# Patient Record
Sex: MALE | Race: WHITE | Employment: OTHER | URBAN - METROPOLITAN AREA
[De-identification: names, ages, dates, MRNs, and addresses within clinical notes are randomized per-mention and may not be internally consistent; named-entity substitution may affect disease eponyms.]

---

## 2017-03-24 RX ORDER — LISINOPRIL AND HYDROCHLOROTHIAZIDE 12.5; 1 MG/1; MG/1
TABLET ORAL
Qty: 30 TABLET | Refills: 0 | Status: SHIPPED | OUTPATIENT
Start: 2017-03-24 | End: 2017-04-24 | Stop reason: SDUPTHER

## 2017-04-24 RX ORDER — LISINOPRIL AND HYDROCHLOROTHIAZIDE 12.5; 1 MG/1; MG/1
TABLET ORAL
Qty: 30 TABLET | Refills: 0 | Status: SHIPPED | OUTPATIENT
Start: 2017-04-24 | End: 2017-05-23 | Stop reason: SDUPTHER

## 2017-05-19 ENCOUNTER — OFFICE VISIT (OUTPATIENT)
Dept: FAMILY MEDICINE CLINIC | Age: 57
End: 2017-05-19

## 2017-05-19 VITALS
TEMPERATURE: 97.8 F | HEIGHT: 68 IN | HEART RATE: 51 BPM | WEIGHT: 264.4 LBS | SYSTOLIC BLOOD PRESSURE: 139 MMHG | DIASTOLIC BLOOD PRESSURE: 77 MMHG | BODY MASS INDEX: 40.07 KG/M2

## 2017-05-19 DIAGNOSIS — M51.36 DDD (DEGENERATIVE DISC DISEASE), LUMBAR: ICD-10-CM

## 2017-05-19 DIAGNOSIS — I10 ESSENTIAL HYPERTENSION, BENIGN: ICD-10-CM

## 2017-05-19 DIAGNOSIS — Z00.00 ROUTINE GENERAL MEDICAL EXAMINATION AT A HEALTH CARE FACILITY: Primary | ICD-10-CM

## 2017-05-19 DIAGNOSIS — M19.072 PRIMARY OSTEOARTHRITIS OF LEFT ANKLE: ICD-10-CM

## 2017-05-19 DIAGNOSIS — M17.0 PRIMARY OSTEOARTHRITIS OF BOTH KNEES: ICD-10-CM

## 2017-05-19 DIAGNOSIS — E66.01 MORBID OBESITY, UNSPECIFIED OBESITY TYPE (HCC): ICD-10-CM

## 2017-05-19 DIAGNOSIS — Z11.59 NEED FOR HEPATITIS C SCREENING TEST: ICD-10-CM

## 2017-05-19 LAB
HCT VFR BLD CALC: 45.4 % (ref 40.5–52.5)
HEMOGLOBIN: 14.6 G/DL (ref 13.5–17.5)
MCH RBC QN AUTO: 30.6 PG (ref 26–34)
MCHC RBC AUTO-ENTMCNC: 32.1 G/DL (ref 31–36)
MCV RBC AUTO: 95.3 FL (ref 80–100)
PDW BLD-RTO: 14.2 % (ref 12.4–15.4)
PLATELET # BLD: 234 K/UL (ref 135–450)
PMV BLD AUTO: 8.6 FL (ref 5–10.5)
RBC # BLD: 4.77 M/UL (ref 4.2–5.9)
WBC # BLD: 7.6 K/UL (ref 4–11)

## 2017-05-19 PROCEDURE — 99396 PREV VISIT EST AGE 40-64: CPT | Performed by: FAMILY MEDICINE

## 2017-05-19 PROCEDURE — G0444 DEPRESSION SCREEN ANNUAL: HCPCS | Performed by: FAMILY MEDICINE

## 2017-05-19 ASSESSMENT — PATIENT HEALTH QUESTIONNAIRE - PHQ9
SUM OF ALL RESPONSES TO PHQ9 QUESTIONS 1 & 2: 6
2. FEELING DOWN, DEPRESSED OR HOPELESS: 3
9. THOUGHTS THAT YOU WOULD BE BETTER OFF DEAD, OR OF HURTING YOURSELF: 1
10. IF YOU CHECKED OFF ANY PROBLEMS, HOW DIFFICULT HAVE THESE PROBLEMS MADE IT FOR YOU TO DO YOUR WORK, TAKE CARE OF THINGS AT HOME, OR GET ALONG WITH OTHER PEOPLE: 1
SUM OF ALL RESPONSES TO PHQ QUESTIONS 1-9: 21
8. MOVING OR SPEAKING SO SLOWLY THAT OTHER PEOPLE COULD HAVE NOTICED. OR THE OPPOSITE, BEING SO FIGETY OR RESTLESS THAT YOU HAVE BEEN MOVING AROUND A LOT MORE THAN USUAL: 2
7. TROUBLE CONCENTRATING ON THINGS, SUCH AS READING THE NEWSPAPER OR WATCHING TELEVISION: 2
5. POOR APPETITE OR OVEREATING: 1
3. TROUBLE FALLING OR STAYING ASLEEP: 3
1. LITTLE INTEREST OR PLEASURE IN DOING THINGS: 3
4. FEELING TIRED OR HAVING LITTLE ENERGY: 3
6. FEELING BAD ABOUT YOURSELF - OR THAT YOU ARE A FAILURE OR HAVE LET YOURSELF OR YOUR FAMILY DOWN: 3

## 2017-05-20 LAB
A/G RATIO: 2.1 (ref 1.1–2.2)
ALBUMIN SERPL-MCNC: 4.5 G/DL (ref 3.4–5)
ALP BLD-CCNC: 53 U/L (ref 40–129)
ALT SERPL-CCNC: 19 U/L (ref 10–40)
ANION GAP SERPL CALCULATED.3IONS-SCNC: 15 MMOL/L (ref 3–16)
AST SERPL-CCNC: 15 U/L (ref 15–37)
BILIRUB SERPL-MCNC: 0.4 MG/DL (ref 0–1)
BUN BLDV-MCNC: 25 MG/DL (ref 7–20)
CALCIUM SERPL-MCNC: 9.1 MG/DL (ref 8.3–10.6)
CHLORIDE BLD-SCNC: 104 MMOL/L (ref 99–110)
CHOLESTEROL, TOTAL: 191 MG/DL (ref 0–199)
CO2: 22 MMOL/L (ref 21–32)
CREAT SERPL-MCNC: 0.8 MG/DL (ref 0.9–1.3)
ESTIMATED AVERAGE GLUCOSE: 111.2 MG/DL
GFR AFRICAN AMERICAN: >60
GFR NON-AFRICAN AMERICAN: >60
GLOBULIN: 2.1 G/DL
GLUCOSE BLD-MCNC: 92 MG/DL (ref 70–99)
HBA1C MFR BLD: 5.5 %
HDLC SERPL-MCNC: 48 MG/DL (ref 40–60)
HEPATITIS C ANTIBODY INTERPRETATION: NORMAL
LDL CHOLESTEROL CALCULATED: 119 MG/DL
POTASSIUM SERPL-SCNC: 4.9 MMOL/L (ref 3.5–5.1)
PROSTATE SPECIFIC ANTIGEN: 0.49 NG/ML (ref 0–4)
SODIUM BLD-SCNC: 141 MMOL/L (ref 136–145)
TOTAL PROTEIN: 6.6 G/DL (ref 6.4–8.2)
TRIGL SERPL-MCNC: 121 MG/DL (ref 0–150)
TSH SERPL DL<=0.05 MIU/L-ACNC: 1.61 UIU/ML (ref 0.27–4.2)
VLDLC SERPL CALC-MCNC: 24 MG/DL

## 2017-05-26 RX ORDER — IBUPROFEN 800 MG/1
TABLET ORAL
Qty: 90 TABLET | Refills: 5 | Status: SHIPPED | OUTPATIENT
Start: 2017-05-26 | End: 2018-08-22 | Stop reason: SDUPTHER

## 2017-05-26 RX ORDER — LISINOPRIL AND HYDROCHLOROTHIAZIDE 12.5; 1 MG/1; MG/1
TABLET ORAL
Qty: 30 TABLET | Refills: 5 | Status: SHIPPED | OUTPATIENT
Start: 2017-05-26 | End: 2017-11-28 | Stop reason: SDUPTHER

## 2017-06-15 DIAGNOSIS — E66.01 MORBID OBESITY, UNSPECIFIED OBESITY TYPE (HCC): ICD-10-CM

## 2017-06-16 RX ORDER — TOPIRAMATE 100 MG/1
TABLET, FILM COATED ORAL
Qty: 30 TABLET | Refills: 5 | Status: SHIPPED | OUTPATIENT
Start: 2017-06-16 | End: 2018-08-22

## 2018-06-04 RX ORDER — LISINOPRIL AND HYDROCHLOROTHIAZIDE 12.5; 1 MG/1; MG/1
TABLET ORAL
Qty: 30 TABLET | Refills: 0 | Status: SHIPPED | OUTPATIENT
Start: 2018-06-04 | End: 2018-07-10 | Stop reason: SDUPTHER

## 2018-07-10 RX ORDER — LISINOPRIL AND HYDROCHLOROTHIAZIDE 12.5; 1 MG/1; MG/1
TABLET ORAL
Qty: 30 TABLET | Refills: 2 | Status: SHIPPED | OUTPATIENT
Start: 2018-07-10 | End: 2018-10-17 | Stop reason: SDUPTHER

## 2018-08-22 ENCOUNTER — OFFICE VISIT (OUTPATIENT)
Dept: FAMILY MEDICINE CLINIC | Age: 58
End: 2018-08-22

## 2018-08-22 VITALS
OXYGEN SATURATION: 97 % | DIASTOLIC BLOOD PRESSURE: 70 MMHG | WEIGHT: 270.8 LBS | TEMPERATURE: 96.1 F | SYSTOLIC BLOOD PRESSURE: 126 MMHG | HEIGHT: 68 IN | HEART RATE: 66 BPM | RESPIRATION RATE: 14 BRPM | BODY MASS INDEX: 41.04 KG/M2

## 2018-08-22 DIAGNOSIS — R06.83 SNORING: ICD-10-CM

## 2018-08-22 DIAGNOSIS — R29.898 BILATERAL LEG WEAKNESS: ICD-10-CM

## 2018-08-22 DIAGNOSIS — Z86.19 HISTORY OF SHINGLES: ICD-10-CM

## 2018-08-22 DIAGNOSIS — M51.36 DDD (DEGENERATIVE DISC DISEASE), LUMBAR: ICD-10-CM

## 2018-08-22 DIAGNOSIS — R21 RASH OF HANDS: ICD-10-CM

## 2018-08-22 DIAGNOSIS — Z00.00 ROUTINE GENERAL MEDICAL EXAMINATION AT A HEALTH CARE FACILITY: Primary | ICD-10-CM

## 2018-08-22 DIAGNOSIS — E66.01 MORBID OBESITY (HCC): ICD-10-CM

## 2018-08-22 DIAGNOSIS — Z00.00 ROUTINE GENERAL MEDICAL EXAMINATION AT A HEALTH CARE FACILITY: ICD-10-CM

## 2018-08-22 DIAGNOSIS — I10 ESSENTIAL HYPERTENSION, BENIGN: ICD-10-CM

## 2018-08-22 LAB
A/G RATIO: 2 (ref 1.1–2.2)
ALBUMIN SERPL-MCNC: 4.5 G/DL (ref 3.4–5)
ALP BLD-CCNC: 61 U/L (ref 40–129)
ALT SERPL-CCNC: 37 U/L (ref 10–40)
ANION GAP SERPL CALCULATED.3IONS-SCNC: 18 MMOL/L (ref 3–16)
AST SERPL-CCNC: 28 U/L (ref 15–37)
BILIRUB SERPL-MCNC: 0.5 MG/DL (ref 0–1)
BUN BLDV-MCNC: 16 MG/DL (ref 7–20)
CALCIUM SERPL-MCNC: 9.4 MG/DL (ref 8.3–10.6)
CHLORIDE BLD-SCNC: 102 MMOL/L (ref 99–110)
CHOLESTEROL, TOTAL: 206 MG/DL (ref 0–199)
CO2: 21 MMOL/L (ref 21–32)
CREAT SERPL-MCNC: 0.7 MG/DL (ref 0.8–1.3)
GFR AFRICAN AMERICAN: >60
GFR NON-AFRICAN AMERICAN: >60
GLOBULIN: 2.2 G/DL
GLUCOSE BLD-MCNC: 98 MG/DL (ref 70–99)
HDLC SERPL-MCNC: 45 MG/DL (ref 40–60)
LDL CHOLESTEROL CALCULATED: 133 MG/DL
POTASSIUM SERPL-SCNC: 4.6 MMOL/L (ref 3.5–5.1)
SODIUM BLD-SCNC: 141 MMOL/L (ref 136–145)
TOTAL PROTEIN: 6.7 G/DL (ref 6.4–8.2)
TRIGL SERPL-MCNC: 140 MG/DL (ref 0–150)
VLDLC SERPL CALC-MCNC: 28 MG/DL

## 2018-08-22 PROCEDURE — 90750 HZV VACC RECOMBINANT IM: CPT | Performed by: FAMILY MEDICINE

## 2018-08-22 PROCEDURE — 90471 IMMUNIZATION ADMIN: CPT | Performed by: FAMILY MEDICINE

## 2018-08-22 PROCEDURE — 99396 PREV VISIT EST AGE 40-64: CPT | Performed by: FAMILY MEDICINE

## 2018-08-22 RX ORDER — IBUPROFEN 800 MG/1
TABLET ORAL
Qty: 90 TABLET | Refills: 0 | Status: ON HOLD | OUTPATIENT
Start: 2018-08-22 | End: 2020-02-21 | Stop reason: HOSPADM

## 2018-08-22 RX ORDER — CLOTRIMAZOLE AND BETAMETHASONE DIPROPIONATE 10; .64 MG/G; MG/G
CREAM TOPICAL
Qty: 45 G | Refills: 0 | Status: SHIPPED | OUTPATIENT
Start: 2018-08-22 | End: 2020-02-05

## 2018-08-22 ASSESSMENT — PATIENT HEALTH QUESTIONNAIRE - PHQ9
SUM OF ALL RESPONSES TO PHQ QUESTIONS 1-9: 0
1. LITTLE INTEREST OR PLEASURE IN DOING THINGS: 0
SUM OF ALL RESPONSES TO PHQ9 QUESTIONS 1 & 2: 0
2. FEELING DOWN, DEPRESSED OR HOPELESS: 0
SUM OF ALL RESPONSES TO PHQ QUESTIONS 1-9: 0

## 2018-08-22 NOTE — PROGRESS NOTES
Here for well checkup and physical.  Pt states that overall has been feeling well. Pt denies any new issues, concerns. Over the winter, pt was down in [place] and developed acute onset of painful rash with swelling and discomfort. Pt went into urgent care and got checked out and was dx with shingles. Pt was treated with valtrex and steroids and sx did improve moderately/quickly. Sx lasted a few weeks   Pt states that was in December, and has not had any recurrent sx. Pt did not have any trigger except was kelsea    Pt here for follow up of blood pressure. Pt states doing great with adherence to therapy and feels well. No issues of chest pain, shortness of breath. No vision changes, headache, swelling in legs. Pt is dealing with weight issues, relates to chronic back issues and ankle. Pt is dealing with chronic back sx. Pt is active and able to function well with his job, being active. Pt does want to look at getting in to see neurosurgery. Pt has done physical therapy in the past and was not helpful, has not had any recent therapy and has never had epidural steroid injection. Pt is not interested in that at this point. Will take motrin 800mg prn. Pt is retired but is active and doing work around [employer], but is limited in terms of walking distances. Does feel legs are weaker. Pt can walk, drive a car, and walk up stairs ok. bowels are doing ok overal, states that ever since colonoscopy does feel that he is having bowel movements for a longer period of time. No episodes of blood, pt does have some intermittent hemorrhoids. Bowels are soft.           Except as noted in the history of present illness as above, the review of systems is negative for the following:    General ROS: negative  Psychological ROS: negative  Allergy and Immunology ROS: negative  Hematological and Lymphatic ROS: negative  Respiratory ROS: no cough, shortness of breath, or wheezing  Cardiovascular ROS: no chest pain or dyspnea on exertion  Gastrointestinal ROS: no abdominal pain, change in bowel habits, or black or bloody stools  Genito-Urinary ROS: no dysuria, trouble voiding, or hematuria  Musculoskeletal ROS: negative  Dermatological ROS: negative      Past medical, surgical, and social history reviewed and updated. Medications and allergies reviewed and updated      /70   Pulse 66   Temp 96.1 °F (35.6 °C) (Oral)   Resp 14   Ht 5' 8.25\" (1.734 m)   Wt 270 lb 12.8 oz (122.8 kg)   SpO2 97%   BMI 40.87 kg/m²   General appearance - healthy, alert, no distress  Skin - Skin color, texture, turgor normal. Mild irritated rash between fingers R hand w/o discharge, erythema, no warmth, discharge, bleeding  Head - Normocephalic. No masses, lesions, tenderness or abnormalities  Eyes - conjunctivae/corneas clear. Pupils equal and reactive to light and accomodation, extraocular muscles intact. Ears - External ears normal. Canals clear. Tympanic membranes normal bilaterally. Nose/Sinuses - Nares normal. Septum midline. Mucosa normal. No drainage or sinus tenderness. Oropharynx - Lips, mucosa, and tongue normal. Teeth and gums normal.   Neck - Neck supple. No adenopathy. Thyroid symmetric, normal size, no carotid bruit bilaterally  Back - Back symmetric, no curvature. Range of motion normal. No Costovertebral angle tenderness. Lungs - Percussion normal. Good diaphragmatic excursion. Lungs clear without wheeze, rales, crackles  Heart - Regular rate and rhythm, with no rub, murmur or gallop noted. Abdomen - Abdomen soft, non-tender. Bowel sounds normal. No masses, tenderness or organomegaly  Testes are normal without masses, no hernias noted. Phallus normal.   Extremities - Extremities normal. No deformities, edema, or skin discoloration  Musculoskeletal - Spine ROM normal. Muscular strength intact.   Peripheral pulses - radial=4/4,, femoral=4/4, popliteal=4/4, dorsalis pedis=4/4,  Neuro - Gait normal. Reflexes normal and symmetric. Sensation grossly normal.  No focal weakness  Psych - euthymic, no suicidal thoughts or ideation, mood stable. Current Outpatient Prescriptions   Medication Sig Dispense Refill    clotrimazole-betamethasone (LOTRISONE) 1-0.05 % cream Apply topically 2 times daily. 45 g 0    lisinopril-hydrochlorothiazide (PRINZIDE;ZESTORETIC) 10-12.5 MG per tablet TAKE 1 TABLET BY MOUTH DAILY 30 tablet 2    omeprazole (PRILOSEC) 20 MG delayed release capsule TAKE ONE CAPSULE BY MOUTH DAILY (Patient taking differently: TAKE ONE CAPSULE BY MOUTH DAILY, PRN) 30 capsule 5    Multiple Vitamins-Minerals (THERAPEUTIC MULTIVITAMIN-MINERALS) tablet Take 1 tablet by mouth daily      ibuprofen (ADVIL;MOTRIN) 800 MG tablet TAKE 1 TABLET BY MOUTH EVERY 6 HOURS AS NEEDED FOR PAIN 90 tablet 0     No current facility-administered medications for this visit. ASSESSMENT / PLAN:    1. Routine general medical examination at a health care facility  No focal abnormalities on exam  Anticipatory guidance discussed. Check bloodwork as below  - CBC; Future  - Comprehensive Metabolic Panel; Future  - Lipid Panel; Future  - PSA, Prostatic Specific Antigen; Future    2. Essential hypertension, benign  blood pressure stable @ goal   Encouraged diet/exercise and weight reduction    3. Morbid obesity ([address]  Aware risk of persistent weight issues  Monitor with attempt at improvement in diet/exericise  Check bloodwork as above    4. History of shingles  Asymptomatic  Given shingrix today    5. Rash of hands  C/w contact derm  tx with lotrisone cream to affected area BID prn    6. DDD (degenerative disc disease), lumbar  Stable w/o flare of sx  Monitor with symptomatic tx, range of motion exercises  F/u for increased sx, weakness in bilateral lower extremities, urinary sx  Consider neurosurgery referral    7.  Bilateral leg weakness  Exam nonfocal  D/t DDD  Aware risk of persistent weakness or progressive sx without tx of degenerative disc disease  Will monitor    8. Snoring  Suspect HORACIO  Pt aware, deferring eval at this time  Aware risk of undertreated/undertreated obstructive sleep apnea            Follow-up appointment:   Pending bloodwork reuslts    Discussed use, benefit, and side effects of all prescribed medications. Barriers to medication compliance addressed. All patient questions answered. Pt voiced understanding. When applicable, patient's outside records were reviewed through [facility]. The patient has signed appropriate paperworks/consents. Dragon dictation software was used for parts of this progress note. All attempts were made to correct any errors and ensure accuracy.

## 2018-08-23 LAB
HCT VFR BLD CALC: 47.8 % (ref 40.5–52.5)
HEMOGLOBIN: 15.9 G/DL (ref 13.5–17.5)
MCH RBC QN AUTO: 30.7 PG (ref 26–34)
MCHC RBC AUTO-ENTMCNC: 33.3 G/DL (ref 31–36)
MCV RBC AUTO: 92.2 FL (ref 80–100)
PDW BLD-RTO: 13.8 % (ref 12.4–15.4)
PLATELET # BLD: 272 K/UL (ref 135–450)
PLATELET SLIDE REVIEW: ADEQUATE
PMV BLD AUTO: 9.1 FL (ref 5–10.5)
PROSTATE SPECIFIC ANTIGEN: 0.59 NG/ML (ref 0–4)
RBC # BLD: 5.18 M/UL (ref 4.2–5.9)
WBC # BLD: 9.3 K/UL (ref 4–11)

## 2018-10-17 RX ORDER — LISINOPRIL AND HYDROCHLOROTHIAZIDE 12.5; 1 MG/1; MG/1
TABLET ORAL
Qty: 30 TABLET | Refills: 5 | Status: SHIPPED | OUTPATIENT
Start: 2018-10-17 | End: 2019-02-20

## 2019-01-05 ENCOUNTER — TELEPHONE (OUTPATIENT)
Dept: FAMILY MEDICINE CLINIC | Age: 59
End: 2019-01-05

## 2019-02-13 RX ORDER — OMEPRAZOLE 20 MG/1
CAPSULE, DELAYED RELEASE ORAL
Qty: 30 CAPSULE | Refills: 3 | Status: SHIPPED | OUTPATIENT
Start: 2019-02-13 | End: 2019-09-12 | Stop reason: SDUPTHER

## 2019-02-20 ENCOUNTER — OFFICE VISIT (OUTPATIENT)
Dept: FAMILY MEDICINE CLINIC | Age: 59
End: 2019-02-20
Payer: COMMERCIAL

## 2019-02-20 VITALS
RESPIRATION RATE: 12 BRPM | BODY MASS INDEX: 40.6 KG/M2 | HEART RATE: 64 BPM | SYSTOLIC BLOOD PRESSURE: 164 MMHG | WEIGHT: 269 LBS | DIASTOLIC BLOOD PRESSURE: 85 MMHG | OXYGEN SATURATION: 96 %

## 2019-02-20 DIAGNOSIS — Z23 NEED FOR SHINGLES VACCINE: ICD-10-CM

## 2019-02-20 DIAGNOSIS — M17.0 PRIMARY OSTEOARTHRITIS OF BOTH KNEES: ICD-10-CM

## 2019-02-20 DIAGNOSIS — L40.9 PSORIASIS: ICD-10-CM

## 2019-02-20 DIAGNOSIS — M19.072 PRIMARY OSTEOARTHRITIS OF LEFT ANKLE: ICD-10-CM

## 2019-02-20 DIAGNOSIS — I10 ESSENTIAL HYPERTENSION, BENIGN: ICD-10-CM

## 2019-02-20 DIAGNOSIS — E66.01 MORBID OBESITY (HCC): ICD-10-CM

## 2019-02-20 DIAGNOSIS — E66.01 CLASS 3 SEVERE OBESITY WITH SERIOUS COMORBIDITY AND BODY MASS INDEX (BMI) OF 40.0 TO 44.9 IN ADULT, UNSPECIFIED OBESITY TYPE (HCC): ICD-10-CM

## 2019-02-20 DIAGNOSIS — M51.36 DDD (DEGENERATIVE DISC DISEASE), LUMBAR: Primary | ICD-10-CM

## 2019-02-20 DIAGNOSIS — L82.1 SK (SEBORRHEIC KERATOSIS): ICD-10-CM

## 2019-02-20 PROCEDURE — 99214 OFFICE O/P EST MOD 30 MIN: CPT | Performed by: FAMILY MEDICINE

## 2019-02-20 PROCEDURE — 90750 HZV VACC RECOMBINANT IM: CPT | Performed by: FAMILY MEDICINE

## 2019-02-20 PROCEDURE — 90471 IMMUNIZATION ADMIN: CPT | Performed by: FAMILY MEDICINE

## 2019-02-20 RX ORDER — CALCIPOTRIENE 50 UG/G
CREAM TOPICAL
Qty: 1 TUBE | Refills: 5 | Status: SHIPPED | OUTPATIENT
Start: 2019-02-20 | End: 2020-02-05

## 2019-02-20 RX ORDER — LISINOPRIL AND HYDROCHLOROTHIAZIDE 25; 20 MG/1; MG/1
1 TABLET ORAL DAILY
Qty: 30 TABLET | Refills: 5 | Status: SHIPPED | OUTPATIENT
Start: 2019-02-20 | End: 2019-08-20 | Stop reason: SDUPTHER

## 2019-02-20 ASSESSMENT — PATIENT HEALTH QUESTIONNAIRE - PHQ9
SUM OF ALL RESPONSES TO PHQ QUESTIONS 1-9: 0
SUM OF ALL RESPONSES TO PHQ9 QUESTIONS 1 & 2: 0
SUM OF ALL RESPONSES TO PHQ QUESTIONS 1-9: 0
2. FEELING DOWN, DEPRESSED OR HOPELESS: 0
1. LITTLE INTEREST OR PLEASURE IN DOING THINGS: 0

## 2019-03-26 DIAGNOSIS — L40.9 PSORIASIS: ICD-10-CM

## 2019-03-26 LAB
A/G RATIO: 1.8 (ref 1.1–2.2)
ALBUMIN SERPL-MCNC: 4.6 G/DL (ref 3.4–5)
ALP BLD-CCNC: 55 U/L (ref 40–129)
ALT SERPL-CCNC: 36 U/L (ref 10–40)
ANION GAP SERPL CALCULATED.3IONS-SCNC: 16 MMOL/L (ref 3–16)
AST SERPL-CCNC: 24 U/L (ref 15–37)
BILIRUB SERPL-MCNC: 0.5 MG/DL (ref 0–1)
BUN BLDV-MCNC: 15 MG/DL (ref 7–20)
C-REACTIVE PROTEIN: 5.2 MG/L (ref 0–5.1)
CALCIUM SERPL-MCNC: 9.8 MG/DL (ref 8.3–10.6)
CHLORIDE BLD-SCNC: 101 MMOL/L (ref 99–110)
CO2: 23 MMOL/L (ref 21–32)
CREAT SERPL-MCNC: 0.6 MG/DL (ref 0.8–1.3)
GFR AFRICAN AMERICAN: >60
GFR NON-AFRICAN AMERICAN: >60
GLOBULIN: 2.6 G/DL
GLUCOSE BLD-MCNC: 84 MG/DL (ref 70–99)
HCT VFR BLD CALC: 47.7 % (ref 40.5–52.5)
HEMOGLOBIN: 16.1 G/DL (ref 13.5–17.5)
MCH RBC QN AUTO: 31 PG (ref 26–34)
MCHC RBC AUTO-ENTMCNC: 33.7 G/DL (ref 31–36)
MCV RBC AUTO: 92 FL (ref 80–100)
PDW BLD-RTO: 13.2 % (ref 12.4–15.4)
PLATELET # BLD: 237 K/UL (ref 135–450)
PMV BLD AUTO: 8.5 FL (ref 5–10.5)
POTASSIUM SERPL-SCNC: 4.6 MMOL/L (ref 3.5–5.1)
RBC # BLD: 5.19 M/UL (ref 4.2–5.9)
RHEUMATOID FACTOR: <10 IU/ML
SODIUM BLD-SCNC: 140 MMOL/L (ref 136–145)
TOTAL PROTEIN: 7.2 G/DL (ref 6.4–8.2)
WBC # BLD: 8.4 K/UL (ref 4–11)

## 2019-03-27 LAB — ANTI-NUCLEAR ANTIBODY (ANA): NEGATIVE

## 2019-05-07 RX ORDER — SILDENAFIL 100 MG/1
100 TABLET, FILM COATED ORAL PRN
Qty: 3 TABLET | Refills: 11 | Status: SHIPPED | OUTPATIENT
Start: 2019-05-07 | End: 2021-07-20

## 2019-08-20 RX ORDER — LISINOPRIL AND HYDROCHLOROTHIAZIDE 25; 20 MG/1; MG/1
TABLET ORAL
Qty: 30 TABLET | Refills: 5 | Status: SHIPPED | OUTPATIENT
Start: 2019-08-20 | End: 2020-03-11 | Stop reason: SDUPTHER

## 2019-09-12 RX ORDER — OMEPRAZOLE 20 MG/1
CAPSULE, DELAYED RELEASE ORAL
Qty: 30 CAPSULE | Refills: 1 | Status: SHIPPED | OUTPATIENT
Start: 2019-09-12 | End: 2019-12-11 | Stop reason: SDUPTHER

## 2019-09-20 RX ORDER — OMEPRAZOLE 20 MG/1
CAPSULE, DELAYED RELEASE ORAL
Qty: 90 CAPSULE | Refills: 1 | Status: ON HOLD | OUTPATIENT
Start: 2019-09-20 | End: 2020-02-20 | Stop reason: SDUPTHER

## 2019-10-29 ENCOUNTER — HOSPITAL ENCOUNTER (OUTPATIENT)
Dept: GENERAL RADIOLOGY | Age: 59
Discharge: HOME | End: 2019-10-29
Payer: COMMERCIAL

## 2019-10-29 DIAGNOSIS — M48.062 LUMBAR STENOSIS WITH NEUROGENIC CLAUDICATION: ICD-10-CM

## 2019-10-29 DIAGNOSIS — Z13.820 ENCOUNTER FOR IMAGING TO ASSESS OSTEOPOROSIS: ICD-10-CM

## 2019-10-29 PROCEDURE — 77080 DXA BONE DENSITY AXIAL: CPT

## 2019-12-11 RX ORDER — OMEPRAZOLE 20 MG/1
CAPSULE, DELAYED RELEASE ORAL
Qty: 30 CAPSULE | Refills: 0 | Status: SHIPPED | OUTPATIENT
Start: 2019-12-11 | End: 2019-12-20

## 2020-02-05 ENCOUNTER — OFFICE VISIT (OUTPATIENT)
Dept: FAMILY MEDICINE CLINIC | Age: 60
End: 2020-02-05
Payer: COMMERCIAL

## 2020-02-05 VITALS
RESPIRATION RATE: 14 BRPM | WEIGHT: 272 LBS | BODY MASS INDEX: 40.29 KG/M2 | SYSTOLIC BLOOD PRESSURE: 131 MMHG | HEIGHT: 69 IN | DIASTOLIC BLOOD PRESSURE: 74 MMHG | HEART RATE: 57 BPM | TEMPERATURE: 97.6 F | OXYGEN SATURATION: 96 %

## 2020-02-05 PROCEDURE — 99244 OFF/OP CNSLTJ NEW/EST MOD 40: CPT | Performed by: FAMILY MEDICINE

## 2020-02-05 PROCEDURE — 93000 ELECTROCARDIOGRAM COMPLETE: CPT | Performed by: FAMILY MEDICINE

## 2020-02-05 RX ORDER — CLOBETASOL PROPIONATE 0.5 MG/G
CREAM TOPICAL
Qty: 45 G | Refills: 2 | Status: ON HOLD | OUTPATIENT
Start: 2020-02-05 | End: 2020-02-20 | Stop reason: CLARIF

## 2020-02-05 ASSESSMENT — PATIENT HEALTH QUESTIONNAIRE - PHQ9
SUM OF ALL RESPONSES TO PHQ9 QUESTIONS 1 & 2: 2
1. LITTLE INTEREST OR PLEASURE IN DOING THINGS: 1
SUM OF ALL RESPONSES TO PHQ QUESTIONS 1-9: 2
SUM OF ALL RESPONSES TO PHQ QUESTIONS 1-9: 2
2. FEELING DOWN, DEPRESSED OR HOPELESS: 1

## 2020-02-05 NOTE — PROGRESS NOTES
Subjective:    Chief Complaint:     Milly Gonzalez is a 62 y.o. male who presents for a preoperative physical examination. He is scheduled to have lumbar disc surgery done by Dr. Sarah Chow at [facility] on 2/20/2020. Pt has had chronic degenerative disc disease in lower back, present for year, but with progressive sx recently. No injury recently but progressively getting worse. Pt states that he has pain, sciatica and weakness. Pt has had recent imaging through [facility]. Pt has not found any benefit with conservative therapy and due to progressive sx, lack of benefit with physical therapy, will be set for surgery. Pt will have anterior/post approach and will see Dr. Geetha Kiran as he will be doing he anterior approach. Surgery will take several hours, and then will be in hospital for 1-2 days, and then home. Pt anticipates will be taking it easy after surgery for 2 wks until f/u surgery    Pt states that he is doing ok with this but with some anxiety. Pt has had several surgeries     Pt here for follow up of blood pressure. Pt states doing great with adherence to therapy and feels well. No issues of chest pain, shortness of breath. No vision changes, headache, swelling in legs.          Past Medical History:   Diagnosis Date    Cholesteatoma     DDD (degenerative disc disease), lumbar     Erectile dysfunction     Essential hypertension, benign     GERD (gastroesophageal reflux disease) 6/20/2014    Low testosterone 1/27/2014    Morbid obesity [address] [address] 3/10/2016    Nontraumatic rupture of tendons of biceps (long head)     Plantar fasciitis     Primary osteoarthritis of both knees 5/19/2017    Primary osteoarthritis of left ankle 5/19/2017          Review of patient's past surgical history indicates:     Past Surgical History:   Procedure Laterality Date    BICEPS TENDON REPAIR Left     CATARACT REMOVAL      MIDDLE EAR SURGERY Right     TONSILLECTOMY AND ADENOIDECTOMY      VASECTOMY No anesthesia complications         Current Outpatient Medications   Medication Sig Dispense Refill    omeprazole (PRILOSEC) 20 MG delayed release capsule TAKE 1 CAPSULE BY MOUTH EVERY DAY 90 capsule 1    omeprazole (PRILOSEC) 20 MG delayed release capsule TAKE 1 CAPSULE BY MOUTH EVERY DAY 90 capsule 1    lisinopril-hydrochlorothiazide (PRINZIDE;ZESTORETIC) 20-25 MG per tablet TAKE 1 TABLET BY MOUTH EVERY DAY 30 tablet 5    sildenafil (VIAGRA) 100 MG tablet Take 1 tablet by mouth as needed for Erectile Dysfunction 3 tablet 11    ibuprofen (ADVIL;MOTRIN) 800 MG tablet TAKE 1 TABLET BY MOUTH EVERY 6 HOURS AS NEEDED FOR PAIN 90 tablet 0    Multiple Vitamins-Minerals (THERAPEUTIC MULTIVITAMIN-MINERALS) tablet Take 1 tablet by mouth daily      calcipotriene (DOVONEX) 0.005 % cream Apply topically 2 times daily. (Patient not taking: Reported on 2020) 1 Tube 5    clotrimazole-betamethasone (LOTRISONE) 1-0.05 % cream Apply topically 2 times daily. (Patient not taking: Reported on 2020) 45 g 0     No current facility-administered medications for this visit. Allergies   Allergen Reactions    Penicillins        Social History     Tobacco Use    Smoking status: Former Smoker     Packs/day: 2.00     Years: 40.00     Pack years: 80.00     Types: Cigarettes     Last attempt to quit: 2012     Years since quittin.8    Smokeless tobacco: Never Used   Substance Use Topics    Alcohol use:  Yes     Alcohol/week: 16.7 standard drinks     Types: 20 Standard drinks or equivalent per week    Drug use: Not on file        Family History   Problem Relation Age of Onset    Other Unknown         AAA    Hypertension Unknown     Ovarian Cancer Mother     Rheum Arthritis Unknown         Review Of Systems    Skin: no abnormal pigmentation, rash, scaling, itching, masses, hair or nail changes  Eyes: negative  Ears/Nose/Throat: negative  Respiratory: negative  Cardiovascular: negative  Gastrointestinal: negative  Genitourinary: negative  Musculoskeletal: negative  Neurologic: negative  Psychiatric: negative  Hematologic/Lymphatic/Immunologic: negative  Endocrine: negative       Objective:      /74   Pulse 57   Temp 97.6 °F (36.4 °C) (Oral)   Resp 14   Ht 5' 8.5\" (1.74 m)   Wt 272 lb (123.4 kg)   SpO2 96%   BMI 40.76 kg/m²   General appearance - healthy, alert, no distress  Skin - Skin color, texture, turgor normal. No rashes or lesions. Head - Normocephalic. No masses, lesions, tenderness or abnormalities  Eyes - conjunctivae/corneas clear. PERRL, EOM's intact. Ears - External ears normal. Canals clear. TM's normal.  Nose/Sinuses - Nares normal. Septum midline. Mucosa normal. No drainage or sinus tenderness. Oropharynx - Lips, mucosa, and tongue normal. Teeth and gums normal.   Neck - Neck supple. No adenopathy. Thyroid symmetric, normal size,  Back - Back symmetric, no curvature. ROM normal. No CVA tenderness. Lungs - Percussion normal. Good diaphragmatic excursion. Lungs clear  Heart - Regular rate and rhythm, with no rub, murmur or gallop noted. Abdomen - Abdomen soft, non-tender. BS normal. No masses, organomegaly  Extremities - Extremities normal. No deformities, edema, or skin discoloration  Musculoskeletal - Spine ROM normal. Muscular strength intact. Peripheral pulses - radial=4/4,, femoral=4/4, popliteal=4/4, dorsalis pedis=4/4,  Neuro - Gait normal. Reflexes normal and symmetric. Sensation grossly normal.  No focal weakness    EKG: normal EKG, normal sinus rhythm, unchanged from previous tracings, sinus bradycardia. ASSESSMENT / PLAN:    1. Preop general physical exam  Medically cleared for surgery. Check bloodwork as below  - EKG 12 Lead  - Protime-INR; Future  - APTT; Future    2. DDD (degenerative disc disease), lumbar  Chronic progressive, set for surgery  Medically cleared for surgery.       3. Essential hypertension, benign  Stable @ goal,

## 2020-02-07 DIAGNOSIS — Z01.818 PREOP GENERAL PHYSICAL EXAM: ICD-10-CM

## 2020-02-07 DIAGNOSIS — E66.01 CLASS 3 SEVERE OBESITY WITH SERIOUS COMORBIDITY AND BODY MASS INDEX (BMI) OF 40.0 TO 44.9 IN ADULT, UNSPECIFIED OBESITY TYPE (HCC): ICD-10-CM

## 2020-02-07 DIAGNOSIS — I10 ESSENTIAL HYPERTENSION, BENIGN: ICD-10-CM

## 2020-02-07 LAB
A/G RATIO: 2.2 (ref 1.1–2.2)
ALBUMIN SERPL-MCNC: 4.4 G/DL (ref 3.4–5)
ALP BLD-CCNC: 50 U/L (ref 40–129)
ALT SERPL-CCNC: 38 U/L (ref 10–40)
ANION GAP SERPL CALCULATED.3IONS-SCNC: 11 MMOL/L (ref 3–16)
APTT: 31.3 SEC (ref 24.2–36.2)
AST SERPL-CCNC: 26 U/L (ref 15–37)
BILIRUB SERPL-MCNC: <0.2 MG/DL (ref 0–1)
BUN BLDV-MCNC: 15 MG/DL (ref 7–20)
CALCIUM SERPL-MCNC: 9.3 MG/DL (ref 8.3–10.6)
CHLORIDE BLD-SCNC: 104 MMOL/L (ref 99–110)
CHOLESTEROL, TOTAL: 199 MG/DL (ref 0–199)
CO2: 26 MMOL/L (ref 21–32)
CREAT SERPL-MCNC: 0.7 MG/DL (ref 0.8–1.3)
ESTIMATED AVERAGE GLUCOSE: 116.9 MG/DL
GFR AFRICAN AMERICAN: >60
GFR NON-AFRICAN AMERICAN: >60
GLOBULIN: 2 G/DL
GLUCOSE BLD-MCNC: 102 MG/DL (ref 70–99)
HBA1C MFR BLD: 5.7 %
HCT VFR BLD CALC: 48 % (ref 40.5–52.5)
HDLC SERPL-MCNC: 50 MG/DL (ref 40–60)
HEMOGLOBIN: 16 G/DL (ref 13.5–17.5)
INR BLD: 0.91 (ref 0.86–1.14)
LDL CHOLESTEROL CALCULATED: 130 MG/DL
MCH RBC QN AUTO: 31.4 PG (ref 26–34)
MCHC RBC AUTO-ENTMCNC: 33.4 G/DL (ref 31–36)
MCV RBC AUTO: 94 FL (ref 80–100)
PDW BLD-RTO: 13.3 % (ref 12.4–15.4)
PLATELET # BLD: 210 K/UL (ref 135–450)
PMV BLD AUTO: 8.4 FL (ref 5–10.5)
POTASSIUM SERPL-SCNC: 4.6 MMOL/L (ref 3.5–5.1)
PROTHROMBIN TIME: 10.6 SEC (ref 10–13.2)
RBC # BLD: 5.1 M/UL (ref 4.2–5.9)
SODIUM BLD-SCNC: 141 MMOL/L (ref 136–145)
TOTAL PROTEIN: 6.4 G/DL (ref 6.4–8.2)
TRIGL SERPL-MCNC: 95 MG/DL (ref 0–150)
TSH SERPL DL<=0.05 MIU/L-ACNC: 1.22 UIU/ML (ref 0.27–4.2)
VLDLC SERPL CALC-MCNC: 19 MG/DL
WBC # BLD: 7.6 K/UL (ref 4–11)

## 2020-02-12 NOTE — PROGRESS NOTES
[facility] / [facility] Jasper Verma, [address]    Acknowledgment of Informed Consent for Surgical or Medical Procedure and Sedation  I agree to allow doctor(s) [address] and his/her associates or assistants, including residents and/or other qualified medical practitioner to perform the following medical treatment or procedure and to administer or direct the administration of sedation as necessary:  Procedure(s): L4, S1 ANTERIOR LUMBAR INTERBODY FUSION WITH PEDICLE SCREW FIXATION; ANTERIOR RETROPERITONEAL EXPOSURE FOR ANTERIOR LUMBAR INTERBODY FUSION  My doctor has explained the following regarding the proposed procedure:   the explanation of the procedure   the benefits of the procedure   the potential problems that might occur during recuperation   the risks and side effects of the procedure which could include but are not limited to severe blood loss, infection, stroke or death   the benefits, risks and side effect of alternative procedures including the consequences of declining this procedure or any alternative procedures   the likelihood of achieving satisfactory results. I acknowledge no guarantee or assurance has been made to me regarding the results. I understand that during the course of this treatment/procedure, unforeseen conditions can occur which require an additional or different procedure. I agree to allow my physician or assistants to perform such extension of the original procedure as they may find necessary. I understand that sedation will often result in temporary impairment of memory and fine motor skills and that sedation can occasionally progress to a state of deep sedation or general anesthesia.     I understand the risks of anesthesia for surgery include, but are not limited to, sore throat, hoarseness, injury to face, mouth, or teeth; nausea; headache; injury to blood vessels or nerves; death, brain damage, or

## 2020-02-19 ENCOUNTER — ANESTHESIA EVENT (OUTPATIENT)
Dept: OPERATING ROOM | Age: 60
DRG: 455 | End: 2020-02-19
Payer: COMMERCIAL

## 2020-02-20 ENCOUNTER — APPOINTMENT (OUTPATIENT)
Dept: GENERAL RADIOLOGY | Age: 60
DRG: 455 | End: 2020-02-20
Attending: NEUROLOGICAL SURGERY
Payer: COMMERCIAL

## 2020-02-20 ENCOUNTER — ANESTHESIA (OUTPATIENT)
Dept: OPERATING ROOM | Age: 60
DRG: 455 | End: 2020-02-20
Payer: COMMERCIAL

## 2020-02-20 ENCOUNTER — HOSPITAL ENCOUNTER (INPATIENT)
Age: 60
LOS: 2 days | Discharge: HOME | DRG: 455 | End: 2020-02-22
Attending: NEUROLOGICAL SURGERY | Admitting: NEUROLOGICAL SURGERY
Payer: COMMERCIAL

## 2020-02-20 VITALS — TEMPERATURE: 92.5 F | DIASTOLIC BLOOD PRESSURE: 69 MMHG | OXYGEN SATURATION: 95 % | SYSTOLIC BLOOD PRESSURE: 114 MMHG

## 2020-02-20 PROBLEM — M43.16 SPONDYLOLISTHESIS AT L4-L5 LEVEL: Status: ACTIVE | Noted: 2020-02-20

## 2020-02-20 LAB
ABO/RH: NORMAL
ANTIBODY SCREEN: NORMAL
ANTIBODY SCREEN: NORMAL
BASOPHILS ABSOLUTE: 0.1 K/UL (ref 0–0.2)
BASOPHILS RELATIVE PERCENT: 1.2 %
EOSINOPHILS ABSOLUTE: 0.1 K/UL (ref 0–0.6)
EOSINOPHILS RELATIVE PERCENT: 1 %
HCT VFR BLD CALC: 46.4 % (ref 40.5–52.5)
HEMOGLOBIN: 15.4 G/DL (ref 13.5–17.5)
LYMPHOCYTES ABSOLUTE: 2.3 K/UL (ref 1–5.1)
LYMPHOCYTES RELATIVE PERCENT: 32.6 %
MCH RBC QN AUTO: 31.4 PG (ref 26–34)
MCHC RBC AUTO-ENTMCNC: 33.3 G/DL (ref 31–36)
MCV RBC AUTO: 94.2 FL (ref 80–100)
MONOCYTES ABSOLUTE: 0.6 K/UL (ref 0–1.3)
MONOCYTES RELATIVE PERCENT: 9.1 %
NEUTROPHILS ABSOLUTE: 4 K/UL (ref 1.7–7.7)
NEUTROPHILS RELATIVE PERCENT: 56.1 %
PDW BLD-RTO: 13.2 % (ref 12.4–15.4)
PLATELET # BLD: 231 K/UL (ref 135–450)
PMV BLD AUTO: 8.2 FL (ref 5–10.5)
RBC # BLD: 4.92 M/UL (ref 4.2–5.9)
WBC # BLD: 7.1 K/UL (ref 4–11)

## 2020-02-20 PROCEDURE — 3600000004 HC SURGERY LEVEL 4 BASE: Performed by: NEUROLOGICAL SURGERY

## 2020-02-20 PROCEDURE — 72100 X-RAY EXAM L-S SPINE 2/3 VWS: CPT

## 2020-02-20 PROCEDURE — G0378 HOSPITAL OBSERVATION PER HR: HCPCS

## 2020-02-20 PROCEDURE — 85025 COMPLETE CBC W/AUTO DIFF WBC: CPT

## 2020-02-20 PROCEDURE — 0SB20ZZ EXCISION OF LUMBAR VERTEBRAL DISC, OPEN APPROACH: ICD-10-PCS | Performed by: NEUROLOGICAL SURGERY

## 2020-02-20 PROCEDURE — 94664 DEMO&/EVAL PT USE INHALER: CPT

## 2020-02-20 PROCEDURE — 86901 BLOOD TYPING SEROLOGIC RH(D): CPT

## 2020-02-20 PROCEDURE — 3600000014 HC SURGERY LEVEL 4 ADDTL 15MIN: Performed by: NEUROLOGICAL SURGERY

## 2020-02-20 PROCEDURE — 6360000002 HC RX W HCPCS: Performed by: NEUROLOGICAL SURGERY

## 2020-02-20 PROCEDURE — 94640 AIRWAY INHALATION TREATMENT: CPT

## 2020-02-20 PROCEDURE — 7100000001 HC PACU RECOVERY - ADDTL 15 MIN: Performed by: NEUROLOGICAL SURGERY

## 2020-02-20 PROCEDURE — 2500000003 HC RX 250 WO HCPCS: Performed by: NURSE ANESTHETIST, CERTIFIED REGISTERED

## 2020-02-20 PROCEDURE — 2500000003 HC RX 250 WO HCPCS: Performed by: ANESTHESIOLOGY

## 2020-02-20 PROCEDURE — 0SG0071 FUSION OF LUMBAR VERTEBRAL JOINT WITH AUTOLOGOUS TISSUE SUBSTITUTE, POSTERIOR APPROACH, POSTERIOR COLUMN, OPEN APPROACH: ICD-10-PCS | Performed by: NEUROLOGICAL SURGERY

## 2020-02-20 PROCEDURE — 3209999900 FLUORO FOR SURGICAL PROCEDURES

## 2020-02-20 PROCEDURE — 6360000002 HC RX W HCPCS: Performed by: NURSE ANESTHETIST, CERTIFIED REGISTERED

## 2020-02-20 PROCEDURE — 2500000003 HC RX 250 WO HCPCS: Performed by: NEUROLOGICAL SURGERY

## 2020-02-20 PROCEDURE — 2580000003 HC RX 258: Performed by: ANESTHESIOLOGY

## 2020-02-20 PROCEDURE — 6370000000 HC RX 637 (ALT 250 FOR IP): Performed by: NEUROLOGICAL SURGERY

## 2020-02-20 PROCEDURE — 2720000010 HC SURG SUPPLY STERILE: Performed by: NEUROLOGICAL SURGERY

## 2020-02-20 PROCEDURE — 2580000003 HC RX 258: Performed by: NURSE ANESTHETIST, CERTIFIED REGISTERED

## 2020-02-20 PROCEDURE — 2700000000 HC OXYGEN THERAPY PER DAY

## 2020-02-20 PROCEDURE — 3700000000 HC ANESTHESIA ATTENDED CARE: Performed by: NEUROLOGICAL SURGERY

## 2020-02-20 PROCEDURE — 86900 BLOOD TYPING SEROLOGIC ABO: CPT

## 2020-02-20 PROCEDURE — 2709999900 HC NON-CHARGEABLE SUPPLY: Performed by: NEUROLOGICAL SURGERY

## 2020-02-20 PROCEDURE — 0SG30A0 FUSION OF LUMBOSACRAL JOINT WITH INTERBODY FUSION DEVICE, ANTERIOR APPROACH, ANTERIOR COLUMN, OPEN APPROACH: ICD-10-PCS | Performed by: NEUROLOGICAL SURGERY

## 2020-02-20 PROCEDURE — C1713 ANCHOR/SCREW BN/BN,TIS/BN: HCPCS | Performed by: NEUROLOGICAL SURGERY

## 2020-02-20 PROCEDURE — 0SG00A0 FUSION OF LUMBAR VERTEBRAL JOINT WITH INTERBODY FUSION DEVICE, ANTERIOR APPROACH, ANTERIOR COLUMN, OPEN APPROACH: ICD-10-PCS | Performed by: NEUROLOGICAL SURGERY

## 2020-02-20 PROCEDURE — 1200000000 HC SEMI PRIVATE

## 2020-02-20 PROCEDURE — 3700000001 HC ADD 15 MINUTES (ANESTHESIA): Performed by: NEUROLOGICAL SURGERY

## 2020-02-20 PROCEDURE — 0SG3071 FUSION OF LUMBOSACRAL JOINT WITH AUTOLOGOUS TISSUE SUBSTITUTE, POSTERIOR APPROACH, POSTERIOR COLUMN, OPEN APPROACH: ICD-10-PCS | Performed by: NEUROLOGICAL SURGERY

## 2020-02-20 PROCEDURE — 0SB40ZZ EXCISION OF LUMBOSACRAL DISC, OPEN APPROACH: ICD-10-PCS | Performed by: NEUROLOGICAL SURGERY

## 2020-02-20 PROCEDURE — 36415 COLL VENOUS BLD VENIPUNCTURE: CPT

## 2020-02-20 PROCEDURE — 7100000000 HC PACU RECOVERY - FIRST 15 MIN: Performed by: NEUROLOGICAL SURGERY

## 2020-02-20 PROCEDURE — C9290 INJ, BUPIVACAINE LIPOSOME: HCPCS | Performed by: NEUROLOGICAL SURGERY

## 2020-02-20 PROCEDURE — 2580000003 HC RX 258: Performed by: NEUROLOGICAL SURGERY

## 2020-02-20 PROCEDURE — 86850 RBC ANTIBODY SCREEN: CPT

## 2020-02-20 PROCEDURE — 2780000010 HC IMPLANT OTHER: Performed by: NEUROLOGICAL SURGERY

## 2020-02-20 DEVICE — GRAFT HUM TISS 15ML FRMBL CELLULAR BNE MTRX CRYOPRESERVED: Type: IMPLANTABLE DEVICE | Site: SPINE LUMBAR | Status: FUNCTIONAL

## 2020-02-20 DEVICE — SCREW SPNL L45MM DIA7MM TI POLYAX EXT TAB FOR MINIMALLY: Type: IMPLANTABLE DEVICE | Site: SPINE LUMBAR | Status: FUNCTIONAL

## 2020-02-20 DEVICE — SCREW SPNL L50MM DIA7MM TI POLYAX EXT TAB FOR MINIMALLY: Type: IMPLANTABLE DEVICE | Site: SPINE LUMBAR | Status: FUNCTIONAL

## 2020-02-20 DEVICE — SET SCR SPNL TI SGL INNR FOR VIPER 2 MINIMALLY INVASIVE: Type: IMPLANTABLE DEVICE | Site: SPINE LUMBAR | Status: FUNCTIONAL

## 2020-02-20 DEVICE — WASHER ORTH DIA13MM FOR L TI SCR: Type: IMPLANTABLE DEVICE | Site: SPINE LUMBAR | Status: FUNCTIONAL

## 2020-02-20 DEVICE — ROD SPNL L70MM DIA5.5MM POST PEDCL TI LORDOSED VIPER 2: Type: IMPLANTABLE DEVICE | Site: SPINE LUMBAR | Status: FUNCTIONAL

## 2020-02-20 DEVICE — IMPLANTABLE DEVICE: Type: IMPLANTABLE DEVICE | Site: SPINE LUMBAR | Status: FUNCTIONAL

## 2020-02-20 DEVICE — SPACER SPNL M H13.5MM 14DEG LUM PEEK SYNCAGE EVOLUTION: Type: IMPLANTABLE DEVICE | Site: SPINE LUMBAR | Status: FUNCTIONAL

## 2020-02-20 DEVICE — ROD SPNL L65MM TI LORDOSED FOR MINIMALLY INVASIVE SURG SYS: Type: IMPLANTABLE DEVICE | Site: SPINE LUMBAR | Status: FUNCTIONAL

## 2020-02-20 DEVICE — SCREW SPNL L25MM DIA4MM TI ALLY ST LOK FULL THRD FN TIP DBL: Type: IMPLANTABLE DEVICE | Site: SPINE LUMBAR | Status: FUNCTIONAL

## 2020-02-20 DEVICE — SPACER SPNL M 3.8ML H13.5X9.4MM 10DEG G PEEK FOR ANT LUM: Type: IMPLANTABLE DEVICE | Site: SPINE LUMBAR | Status: FUNCTIONAL

## 2020-02-20 RX ORDER — ONDANSETRON 2 MG/ML
4 INJECTION INTRAMUSCULAR; INTRAVENOUS
Status: DISCONTINUED | OUTPATIENT
Start: 2020-02-20 | End: 2020-02-20 | Stop reason: HOSPADM

## 2020-02-20 RX ORDER — 0.9 % SODIUM CHLORIDE 0.9 %
500 INTRAVENOUS SOLUTION INTRAVENOUS
Status: DISCONTINUED | OUTPATIENT
Start: 2020-02-20 | End: 2020-02-20 | Stop reason: HOSPADM

## 2020-02-20 RX ORDER — DEXAMETHASONE SODIUM PHOSPHATE 4 MG/ML
4 INJECTION, SOLUTION INTRA-ARTICULAR; INTRALESIONAL; INTRAMUSCULAR; INTRAVENOUS; SOFT TISSUE
Status: DISCONTINUED | OUTPATIENT
Start: 2020-02-20 | End: 2020-02-20 | Stop reason: HOSPADM

## 2020-02-20 RX ORDER — PANTOPRAZOLE SODIUM 40 MG/1
40 TABLET, DELAYED RELEASE ORAL
Status: DISCONTINUED | OUTPATIENT
Start: 2020-02-21 | End: 2020-02-21

## 2020-02-20 RX ORDER — HEPARIN SODIUM 5000 [USP'U]/ML
5000 INJECTION, SOLUTION INTRAVENOUS; SUBCUTANEOUS EVERY 8 HOURS
Status: DISCONTINUED | OUTPATIENT
Start: 2020-02-21 | End: 2020-02-22 | Stop reason: HOSPADM

## 2020-02-20 RX ORDER — POLYETHYLENE GLYCOL 3350 17 G/17G
17 POWDER, FOR SOLUTION ORAL DAILY PRN
Status: DISCONTINUED | OUTPATIENT
Start: 2020-02-20 | End: 2020-02-22 | Stop reason: HOSPADM

## 2020-02-20 RX ORDER — FENTANYL CITRATE 50 UG/ML
25 INJECTION, SOLUTION INTRAMUSCULAR; INTRAVENOUS EVERY 5 MIN PRN
Status: DISCONTINUED | OUTPATIENT
Start: 2020-02-20 | End: 2020-02-20 | Stop reason: HOSPADM

## 2020-02-20 RX ORDER — PROPOFOL 10 MG/ML
INJECTION, EMULSION INTRAVENOUS PRN
Status: DISCONTINUED | OUTPATIENT
Start: 2020-02-20 | End: 2020-02-20 | Stop reason: SDUPTHER

## 2020-02-20 RX ORDER — LIDOCAINE HYDROCHLORIDE 10 MG/ML
1 INJECTION, SOLUTION EPIDURAL; INFILTRATION; INTRACAUDAL; PERINEURAL
Status: DISCONTINUED | OUTPATIENT
Start: 2020-02-20 | End: 2020-02-20 | Stop reason: HOSPADM

## 2020-02-20 RX ORDER — ONDANSETRON 2 MG/ML
INJECTION INTRAMUSCULAR; INTRAVENOUS PRN
Status: DISCONTINUED | OUTPATIENT
Start: 2020-02-20 | End: 2020-02-20 | Stop reason: SDUPTHER

## 2020-02-20 RX ORDER — PROMETHAZINE HYDROCHLORIDE 12.5 MG/1
12.5 TABLET ORAL EVERY 6 HOURS PRN
Status: DISCONTINUED | OUTPATIENT
Start: 2020-02-20 | End: 2020-02-22 | Stop reason: HOSPADM

## 2020-02-20 RX ORDER — SODIUM CHLORIDE 0.9 % (FLUSH) 0.9 %
10 SYRINGE (ML) INJECTION PRN
Status: DISCONTINUED | OUTPATIENT
Start: 2020-02-20 | End: 2020-02-20 | Stop reason: HOSPADM

## 2020-02-20 RX ORDER — SODIUM CHLORIDE 0.9 % (FLUSH) 0.9 %
10 SYRINGE (ML) INJECTION EVERY 12 HOURS SCHEDULED
Status: DISCONTINUED | OUTPATIENT
Start: 2020-02-20 | End: 2020-02-20 | Stop reason: HOSPADM

## 2020-02-20 RX ORDER — SODIUM CHLORIDE 0.9 % (FLUSH) 0.9 %
10 SYRINGE (ML) INJECTION EVERY 12 HOURS SCHEDULED
Status: DISCONTINUED | OUTPATIENT
Start: 2020-02-20 | End: 2020-02-22 | Stop reason: HOSPADM

## 2020-02-20 RX ORDER — LISINOPRIL AND HYDROCHLOROTHIAZIDE 25; 20 MG/1; MG/1
1 TABLET ORAL DAILY
Status: DISCONTINUED | OUTPATIENT
Start: 2020-02-20 | End: 2020-02-22 | Stop reason: HOSPADM

## 2020-02-20 RX ORDER — GLYCOPYRROLATE 1 MG/5 ML
SYRINGE (ML) INTRAVENOUS PRN
Status: DISCONTINUED | OUTPATIENT
Start: 2020-02-20 | End: 2020-02-20

## 2020-02-20 RX ORDER — DIAZEPAM 5 MG/1
5 TABLET ORAL EVERY 6 HOURS PRN
Status: DISCONTINUED | OUTPATIENT
Start: 2020-02-20 | End: 2020-02-22 | Stop reason: HOSPADM

## 2020-02-20 RX ORDER — OXYCODONE HYDROCHLORIDE 5 MG/1
5 TABLET ORAL EVERY 4 HOURS PRN
Status: DISCONTINUED | OUTPATIENT
Start: 2020-02-20 | End: 2020-02-22 | Stop reason: HOSPADM

## 2020-02-20 RX ORDER — ACETAMINOPHEN 325 MG/1
650 TABLET ORAL EVERY 4 HOURS PRN
Status: DISCONTINUED | OUTPATIENT
Start: 2020-02-20 | End: 2020-02-21

## 2020-02-20 RX ORDER — NEOSTIGMINE METHYLSULFATE 5 MG/5 ML
SYRINGE (ML) INTRAVENOUS PRN
Status: DISCONTINUED | OUTPATIENT
Start: 2020-02-20 | End: 2020-02-20 | Stop reason: SDUPTHER

## 2020-02-20 RX ORDER — SENNA AND DOCUSATE SODIUM 50; 8.6 MG/1; MG/1
1 TABLET, FILM COATED ORAL 2 TIMES DAILY
Status: DISCONTINUED | OUTPATIENT
Start: 2020-02-20 | End: 2020-02-22 | Stop reason: HOSPADM

## 2020-02-20 RX ORDER — GLYCOPYRROLATE 0.2 MG/ML
INJECTION INTRAMUSCULAR; INTRAVENOUS PRN
Status: DISCONTINUED | OUTPATIENT
Start: 2020-02-20 | End: 2020-02-20 | Stop reason: SDUPTHER

## 2020-02-20 RX ORDER — LIDOCAINE HYDROCHLORIDE 20 MG/ML
INJECTION, SOLUTION INTRAVENOUS PRN
Status: DISCONTINUED | OUTPATIENT
Start: 2020-02-20 | End: 2020-02-20 | Stop reason: SDUPTHER

## 2020-02-20 RX ORDER — HYDROMORPHONE HCL 110MG/55ML
PATIENT CONTROLLED ANALGESIA SYRINGE INTRAVENOUS PRN
Status: DISCONTINUED | OUTPATIENT
Start: 2020-02-20 | End: 2020-02-20 | Stop reason: SDUPTHER

## 2020-02-20 RX ORDER — SUCCINYLCHOLINE CHLORIDE 20 MG/ML
INJECTION INTRAMUSCULAR; INTRAVENOUS PRN
Status: DISCONTINUED | OUTPATIENT
Start: 2020-02-20 | End: 2020-02-20 | Stop reason: SDUPTHER

## 2020-02-20 RX ORDER — CLINDAMYCIN PHOSPHATE 900 MG/50ML
900 INJECTION INTRAVENOUS ONCE
Status: COMPLETED | OUTPATIENT
Start: 2020-02-20 | End: 2020-02-20

## 2020-02-20 RX ORDER — GLYCOPYRROLATE 1 MG/5 ML
SYRINGE (ML) INTRAVENOUS PRN
Status: DISCONTINUED | OUTPATIENT
Start: 2020-02-20 | End: 2020-02-20 | Stop reason: SDUPTHER

## 2020-02-20 RX ORDER — SODIUM CHLORIDE 9 MG/ML
INJECTION, SOLUTION INTRAVENOUS CONTINUOUS
Status: DISCONTINUED | OUTPATIENT
Start: 2020-02-20 | End: 2020-02-22 | Stop reason: HOSPADM

## 2020-02-20 RX ORDER — DEXAMETHASONE SODIUM PHOSPHATE 4 MG/ML
INJECTION, SOLUTION INTRA-ARTICULAR; INTRALESIONAL; INTRAMUSCULAR; INTRAVENOUS; SOFT TISSUE PRN
Status: DISCONTINUED | OUTPATIENT
Start: 2020-02-20 | End: 2020-02-20 | Stop reason: SDUPTHER

## 2020-02-20 RX ORDER — OXYCODONE HYDROCHLORIDE 5 MG/1
10 TABLET ORAL EVERY 4 HOURS PRN
Status: DISCONTINUED | OUTPATIENT
Start: 2020-02-20 | End: 2020-02-22 | Stop reason: HOSPADM

## 2020-02-20 RX ORDER — BISACODYL 10 MG
10 SUPPOSITORY, RECTAL RECTAL DAILY PRN
Status: DISCONTINUED | OUTPATIENT
Start: 2020-02-20 | End: 2020-02-22 | Stop reason: HOSPADM

## 2020-02-20 RX ORDER — SODIUM CHLORIDE 0.9 % (FLUSH) 0.9 %
10 SYRINGE (ML) INJECTION PRN
Status: DISCONTINUED | OUTPATIENT
Start: 2020-02-20 | End: 2020-02-22 | Stop reason: HOSPADM

## 2020-02-20 RX ORDER — SODIUM CHLORIDE, SODIUM LACTATE, POTASSIUM CHLORIDE, CALCIUM CHLORIDE 600; 310; 30; 20 MG/100ML; MG/100ML; MG/100ML; MG/100ML
INJECTION, SOLUTION INTRAVENOUS CONTINUOUS
Status: DISCONTINUED | OUTPATIENT
Start: 2020-02-20 | End: 2020-02-20

## 2020-02-20 RX ORDER — ROCURONIUM BROMIDE 10 MG/ML
INJECTION, SOLUTION INTRAVENOUS PRN
Status: DISCONTINUED | OUTPATIENT
Start: 2020-02-20 | End: 2020-02-20 | Stop reason: SDUPTHER

## 2020-02-20 RX ORDER — ONDANSETRON 2 MG/ML
4 INJECTION INTRAMUSCULAR; INTRAVENOUS EVERY 6 HOURS PRN
Status: DISCONTINUED | OUTPATIENT
Start: 2020-02-20 | End: 2020-02-22 | Stop reason: HOSPADM

## 2020-02-20 RX ORDER — LIDOCAINE HYDROCHLORIDE ANHYDROUS AND DEXTROSE MONOHYDRATE .4; 5 G/100ML; G/100ML
INJECTION, SOLUTION INTRAVENOUS CONTINUOUS PRN
Status: DISCONTINUED | OUTPATIENT
Start: 2020-02-20 | End: 2020-02-20 | Stop reason: SDUPTHER

## 2020-02-20 RX ORDER — METHOCARBAMOL 750 MG/1
750 TABLET, FILM COATED ORAL EVERY 6 HOURS PRN
Status: DISCONTINUED | OUTPATIENT
Start: 2020-02-21 | End: 2020-02-22 | Stop reason: HOSPADM

## 2020-02-20 RX ORDER — KETAMINE HYDROCHLORIDE 50 MG/ML
INJECTION, SOLUTION, CONCENTRATE INTRAMUSCULAR; INTRAVENOUS PRN
Status: DISCONTINUED | OUTPATIENT
Start: 2020-02-20 | End: 2020-02-20 | Stop reason: SDUPTHER

## 2020-02-20 RX ORDER — CLINDAMYCIN PHOSPHATE 600 MG/50ML
600 INJECTION INTRAVENOUS EVERY 8 HOURS
Status: COMPLETED | OUTPATIENT
Start: 2020-02-20 | End: 2020-02-22

## 2020-02-20 RX ORDER — PROPOFOL 10 MG/ML
INJECTION, EMULSION INTRAVENOUS CONTINUOUS PRN
Status: DISCONTINUED | OUTPATIENT
Start: 2020-02-20 | End: 2020-02-20 | Stop reason: SDUPTHER

## 2020-02-20 RX ADMIN — CLINDAMYCIN PHOSPHATE 900 MG: 18 INJECTION, SOLUTION INTRAVENOUS at 10:35

## 2020-02-20 RX ADMIN — GLYCOPYRROLATE 0.2 MG: 0.2 INJECTION INTRAMUSCULAR; INTRAVENOUS at 12:26

## 2020-02-20 RX ADMIN — PROPOFOL 300 MG: 10 INJECTION, EMULSION INTRAVENOUS at 10:17

## 2020-02-20 RX ADMIN — SUCCINYLCHOLINE CHLORIDE 160 MG: 20 INJECTION, SOLUTION INTRAMUSCULAR; INTRAVENOUS; PARENTERAL at 10:17

## 2020-02-20 RX ADMIN — GLYCOPYRROLATE 0.2 MG: 0.2 INJECTION INTRAMUSCULAR; INTRAVENOUS at 10:58

## 2020-02-20 RX ADMIN — REMIFENTANIL HYDROCHLORIDE 0.05 MCG/KG/MIN: 1 INJECTION, POWDER, LYOPHILIZED, FOR SOLUTION INTRAVENOUS at 10:30

## 2020-02-20 RX ADMIN — PHENYLEPHRINE HYDROCHLORIDE 15 MCG/MIN: 10 INJECTION, SOLUTION INTRAMUSCULAR; INTRAVENOUS; SUBCUTANEOUS at 11:00

## 2020-02-20 RX ADMIN — Medication 0.2 MG: at 13:29

## 2020-02-20 RX ADMIN — SODIUM CHLORIDE, SODIUM LACTATE, POTASSIUM CHLORIDE, AND CALCIUM CHLORIDE: 600; 310; 30; 20 INJECTION, SOLUTION INTRAVENOUS at 11:30

## 2020-02-20 RX ADMIN — ROCURONIUM BROMIDE 40 MG: 10 INJECTION, SOLUTION INTRAVENOUS at 10:25

## 2020-02-20 RX ADMIN — SODIUM CHLORIDE, SODIUM LACTATE, POTASSIUM CHLORIDE, AND CALCIUM CHLORIDE: 600; 310; 30; 20 INJECTION, SOLUTION INTRAVENOUS at 10:17

## 2020-02-20 RX ADMIN — METHOCARBAMOL 1000 MG: 100 INJECTION, SOLUTION INTRAMUSCULAR; INTRAVENOUS at 23:25

## 2020-02-20 RX ADMIN — SODIUM CHLORIDE, POTASSIUM CHLORIDE, SODIUM LACTATE AND CALCIUM CHLORIDE: 600; 310; 30; 20 INJECTION, SOLUTION INTRAVENOUS at 09:57

## 2020-02-20 RX ADMIN — ROCURONIUM BROMIDE 20 MG: 10 INJECTION, SOLUTION INTRAVENOUS at 11:32

## 2020-02-20 RX ADMIN — SODIUM CHLORIDE, SODIUM LACTATE, POTASSIUM CHLORIDE, AND CALCIUM CHLORIDE: 600; 310; 30; 20 INJECTION, SOLUTION INTRAVENOUS at 09:45

## 2020-02-20 RX ADMIN — LIDOCAINE HYDROCHLORIDE ANHYDROUS AND DEXTROSE MONOHYDRATE 2 MG/MIN: .4; 5 INJECTION, SOLUTION INTRAVENOUS at 10:30

## 2020-02-20 RX ADMIN — LISINOPRIL AND HYDROCHLOROTHIAZIDE 1 TABLET: 25; 20 TABLET ORAL at 19:20

## 2020-02-20 RX ADMIN — LIDOCAINE HYDROCHLORIDE 100 MG: 20 INJECTION, SOLUTION INTRAVENOUS at 10:17

## 2020-02-20 RX ADMIN — KETAMINE HYDROCHLORIDE 10 MG: 50 INJECTION, SOLUTION INTRAMUSCULAR; INTRAVENOUS at 15:36

## 2020-02-20 RX ADMIN — HYDROMORPHONE HYDROCHLORIDE 1 MG: 2 INJECTION, SOLUTION INTRAMUSCULAR; INTRAVENOUS; SUBCUTANEOUS at 15:39

## 2020-02-20 RX ADMIN — HYDROMORPHONE HYDROCHLORIDE 1 MG: 2 INJECTION, SOLUTION INTRAMUSCULAR; INTRAVENOUS; SUBCUTANEOUS at 12:55

## 2020-02-20 RX ADMIN — METHOCARBAMOL 1000 MG: 100 INJECTION, SOLUTION INTRAMUSCULAR; INTRAVENOUS at 16:35

## 2020-02-20 RX ADMIN — Medication 0.6 MG: at 15:10

## 2020-02-20 RX ADMIN — GLYCOPYRROLATE 0.2 MG: 0.2 INJECTION INTRAMUSCULAR; INTRAVENOUS at 10:52

## 2020-02-20 RX ADMIN — KETAMINE HYDROCHLORIDE 10 MG: 50 INJECTION, SOLUTION INTRAMUSCULAR; INTRAVENOUS at 15:39

## 2020-02-20 RX ADMIN — SODIUM CHLORIDE, SODIUM LACTATE, POTASSIUM CHLORIDE, AND CALCIUM CHLORIDE: 600; 310; 30; 20 INJECTION, SOLUTION INTRAVENOUS at 12:50

## 2020-02-20 RX ADMIN — OXYCODONE 10 MG: 5 TABLET ORAL at 19:20

## 2020-02-20 RX ADMIN — PROPOFOL 150 MCG/KG/MIN: 10 INJECTION, EMULSION INTRAVENOUS at 10:30

## 2020-02-20 RX ADMIN — KETAMINE HYDROCHLORIDE 10 MG: 50 INJECTION, SOLUTION INTRAMUSCULAR; INTRAVENOUS at 15:33

## 2020-02-20 RX ADMIN — ROCURONIUM BROMIDE 10 MG: 10 INJECTION, SOLUTION INTRAVENOUS at 10:17

## 2020-02-20 RX ADMIN — ONDANSETRON 8 MG: 2 INJECTION INTRAMUSCULAR; INTRAVENOUS at 10:35

## 2020-02-20 RX ADMIN — Medication 3 MG: at 15:10

## 2020-02-20 RX ADMIN — ROCURONIUM BROMIDE 20 MG: 10 INJECTION, SOLUTION INTRAVENOUS at 10:50

## 2020-02-20 RX ADMIN — CLINDAMYCIN IN 5 PERCENT DEXTROSE 600 MG: 12 INJECTION, SOLUTION INTRAVENOUS at 19:21

## 2020-02-20 RX ADMIN — DEXAMETHASONE SODIUM PHOSPHATE 8 MG: 4 INJECTION, SOLUTION INTRAMUSCULAR; INTRAVENOUS at 10:35

## 2020-02-20 RX ADMIN — HYDROMORPHONE HYDROCHLORIDE 1 MG: 2 INJECTION, SOLUTION INTRAMUSCULAR; INTRAVENOUS; SUBCUTANEOUS at 10:17

## 2020-02-20 ASSESSMENT — PULMONARY FUNCTION TESTS
PIF_VALUE: 22
PIF_VALUE: 1
PIF_VALUE: 21
PIF_VALUE: 21
PIF_VALUE: 20
PIF_VALUE: 19
PIF_VALUE: 21
PIF_VALUE: 22
PIF_VALUE: 21
PIF_VALUE: 8
PIF_VALUE: 21
PIF_VALUE: 20
PIF_VALUE: 20
PIF_VALUE: 21
PIF_VALUE: 20
PIF_VALUE: 20
PIF_VALUE: 19
PIF_VALUE: 22
PIF_VALUE: 21
PIF_VALUE: 21
PIF_VALUE: 20
PIF_VALUE: 20
PIF_VALUE: 21
PIF_VALUE: 1
PIF_VALUE: 22
PIF_VALUE: 20
PIF_VALUE: 20
PIF_VALUE: 21
PIF_VALUE: 15
PIF_VALUE: 1
PIF_VALUE: 21
PIF_VALUE: 21
PIF_VALUE: 20
PIF_VALUE: 1
PIF_VALUE: 22
PIF_VALUE: 19
PIF_VALUE: 22
PIF_VALUE: 22
PIF_VALUE: 21
PIF_VALUE: 15
PIF_VALUE: 21
PIF_VALUE: 21
PIF_VALUE: 15
PIF_VALUE: 20
PIF_VALUE: 19
PIF_VALUE: 21
PIF_VALUE: 22
PIF_VALUE: 21
PIF_VALUE: 15
PIF_VALUE: 21
PIF_VALUE: 21
PIF_VALUE: 20
PIF_VALUE: 23
PIF_VALUE: 20
PIF_VALUE: 22
PIF_VALUE: 21
PIF_VALUE: 22
PIF_VALUE: 21
PIF_VALUE: 19
PIF_VALUE: 21
PIF_VALUE: 21
PIF_VALUE: 22
PIF_VALUE: 19
PIF_VALUE: 21
PIF_VALUE: 20
PIF_VALUE: 20
PIF_VALUE: 29
PIF_VALUE: 22
PIF_VALUE: 22
PIF_VALUE: 13
PIF_VALUE: 21
PIF_VALUE: 21
PIF_VALUE: 22
PIF_VALUE: 22
PIF_VALUE: 20
PIF_VALUE: 22
PIF_VALUE: 21
PIF_VALUE: 23
PIF_VALUE: 19
PIF_VALUE: 21
PIF_VALUE: 21
PIF_VALUE: 15
PIF_VALUE: 0
PIF_VALUE: 20
PIF_VALUE: 22
PIF_VALUE: 21
PIF_VALUE: 1
PIF_VALUE: 1
PIF_VALUE: 22
PIF_VALUE: 20
PIF_VALUE: 22
PIF_VALUE: 0
PIF_VALUE: 24
PIF_VALUE: 22
PIF_VALUE: 1
PIF_VALUE: 21
PIF_VALUE: 22
PIF_VALUE: 34
PIF_VALUE: 21
PIF_VALUE: 22
PIF_VALUE: 0
PIF_VALUE: 19
PIF_VALUE: 20
PIF_VALUE: 21
PIF_VALUE: 0
PIF_VALUE: 22
PIF_VALUE: 20
PIF_VALUE: 20
PIF_VALUE: 21
PIF_VALUE: 3
PIF_VALUE: 22
PIF_VALUE: 21
PIF_VALUE: 22
PIF_VALUE: 21
PIF_VALUE: 20
PIF_VALUE: 21
PIF_VALUE: 22
PIF_VALUE: 21
PIF_VALUE: 21
PIF_VALUE: 19
PIF_VALUE: 22
PIF_VALUE: 21
PIF_VALUE: 20
PIF_VALUE: 19
PIF_VALUE: 21
PIF_VALUE: 21
PIF_VALUE: 0
PIF_VALUE: 21
PIF_VALUE: 20
PIF_VALUE: 0
PIF_VALUE: 20
PIF_VALUE: 21
PIF_VALUE: 21
PIF_VALUE: 22
PIF_VALUE: 22
PIF_VALUE: 20
PIF_VALUE: 21
PIF_VALUE: 21
PIF_VALUE: 0
PIF_VALUE: 20
PIF_VALUE: 19
PIF_VALUE: 4
PIF_VALUE: 21
PIF_VALUE: 21
PIF_VALUE: 20
PIF_VALUE: 21
PIF_VALUE: 20
PIF_VALUE: 19
PIF_VALUE: 21
PIF_VALUE: 20
PIF_VALUE: 20
PIF_VALUE: 21
PIF_VALUE: 22
PIF_VALUE: 21
PIF_VALUE: 20
PIF_VALUE: 21
PIF_VALUE: 20
PIF_VALUE: 22
PIF_VALUE: 20
PIF_VALUE: 19
PIF_VALUE: 22
PIF_VALUE: 20
PIF_VALUE: 21
PIF_VALUE: 22
PIF_VALUE: 21
PIF_VALUE: 21
PIF_VALUE: 24
PIF_VALUE: 20
PIF_VALUE: 21
PIF_VALUE: 20
PIF_VALUE: 22
PIF_VALUE: 22
PIF_VALUE: 21
PIF_VALUE: 20
PIF_VALUE: 21
PIF_VALUE: 22
PIF_VALUE: 22
PIF_VALUE: 20
PIF_VALUE: 22
PIF_VALUE: 21
PIF_VALUE: 21
PIF_VALUE: 20
PIF_VALUE: 1
PIF_VALUE: 0
PIF_VALUE: 0
PIF_VALUE: 20
PIF_VALUE: 22
PIF_VALUE: 22
PIF_VALUE: 25
PIF_VALUE: 0
PIF_VALUE: 21
PIF_VALUE: 22
PIF_VALUE: 0
PIF_VALUE: 3
PIF_VALUE: 21
PIF_VALUE: 22
PIF_VALUE: 19
PIF_VALUE: 21
PIF_VALUE: 0
PIF_VALUE: 22
PIF_VALUE: 0
PIF_VALUE: 21
PIF_VALUE: 20
PIF_VALUE: 21
PIF_VALUE: 20
PIF_VALUE: 21
PIF_VALUE: 21
PIF_VALUE: 20
PIF_VALUE: 20
PIF_VALUE: 21
PIF_VALUE: 20
PIF_VALUE: 21
PIF_VALUE: 1
PIF_VALUE: 21
PIF_VALUE: 22
PIF_VALUE: 27
PIF_VALUE: 3
PIF_VALUE: 21
PIF_VALUE: 21
PIF_VALUE: 20
PIF_VALUE: 21
PIF_VALUE: 20
PIF_VALUE: 22
PIF_VALUE: 1
PIF_VALUE: 21
PIF_VALUE: 22
PIF_VALUE: 21
PIF_VALUE: 19
PIF_VALUE: 20
PIF_VALUE: 20
PIF_VALUE: 21
PIF_VALUE: 19
PIF_VALUE: 19
PIF_VALUE: 23
PIF_VALUE: 15
PIF_VALUE: 4
PIF_VALUE: 19
PIF_VALUE: 21
PIF_VALUE: 21
PIF_VALUE: 20
PIF_VALUE: 21
PIF_VALUE: 20
PIF_VALUE: 21
PIF_VALUE: 20
PIF_VALUE: 20
PIF_VALUE: 13
PIF_VALUE: 22
PIF_VALUE: 21
PIF_VALUE: 21
PIF_VALUE: 19
PIF_VALUE: 21
PIF_VALUE: 21
PIF_VALUE: 26
PIF_VALUE: 21
PIF_VALUE: 21
PIF_VALUE: 22
PIF_VALUE: 21
PIF_VALUE: 20
PIF_VALUE: 20
PIF_VALUE: 22
PIF_VALUE: 21
PIF_VALUE: 20
PIF_VALUE: 15
PIF_VALUE: 20
PIF_VALUE: 21
PIF_VALUE: 19
PIF_VALUE: 21
PIF_VALUE: 22
PIF_VALUE: 21
PIF_VALUE: 21
PIF_VALUE: 20
PIF_VALUE: 19
PIF_VALUE: 21
PIF_VALUE: 0
PIF_VALUE: 21
PIF_VALUE: 22
PIF_VALUE: 0
PIF_VALUE: 21
PIF_VALUE: 1
PIF_VALUE: 21
PIF_VALUE: 20
PIF_VALUE: 21
PIF_VALUE: 21
PIF_VALUE: 22
PIF_VALUE: 21
PIF_VALUE: 20
PIF_VALUE: 21
PIF_VALUE: 20
PIF_VALUE: 21
PIF_VALUE: 20

## 2020-02-20 ASSESSMENT — PAIN DESCRIPTION - PROGRESSION: CLINICAL_PROGRESSION: GRADUALLY WORSENING

## 2020-02-20 ASSESSMENT — PAIN DESCRIPTION - FREQUENCY: FREQUENCY: CONTINUOUS

## 2020-02-20 ASSESSMENT — PAIN - FUNCTIONAL ASSESSMENT: PAIN_FUNCTIONAL_ASSESSMENT: 0-10

## 2020-02-20 ASSESSMENT — PAIN DESCRIPTION - LOCATION: LOCATION: BACK

## 2020-02-20 ASSESSMENT — PAIN SCALES - GENERAL
PAINLEVEL_OUTOF10: 0
PAINLEVEL_OUTOF10: 3
PAINLEVEL_OUTOF10: 7

## 2020-02-20 ASSESSMENT — PAIN DESCRIPTION - DESCRIPTORS: DESCRIPTORS: ACHING

## 2020-02-20 ASSESSMENT — PAIN DESCRIPTION - PAIN TYPE: TYPE: SURGICAL PAIN

## 2020-02-20 ASSESSMENT — PAIN DESCRIPTION - ONSET: ONSET: ON-GOING

## 2020-02-20 NOTE — PROGRESS NOTES
Patient admitted to 5527, patient alert and oriented x4, VSS, neuro checks WNL. Patient denies numbness or tingling and only complains of 3/10 pain to lower back that is aching but is refusing pain medication at this time. Surgical sites CD&I and approximated with surgical glue. Zarate in place. Patient oriented to room and call light, call light and belongings within reach. Bed alarm on. Will continue to monitor.

## 2020-02-20 NOTE — H&P
HOURS AS NEEDED FOR PAIN 8/22/18   Rj Vasques MD   Multiple Vitamins-Minerals (THERAPEUTIC MULTIVITAMIN-MINERALS) tablet Take 1 tablet by mouth daily    Historical Provider, MD         Allergies:  Penicillins    PHYSICAL EXAM:      BP (!) 158/104   Pulse 65   Temp 98.3 °F (36.8 °C) (Oral)   Resp 18   Ht 5' 9\" (1.753 m)   Wt 260 lb (117.9 kg)   SpO2 96%   BMI 38.40 kg/m²      Airway:  Airway patent with no audible stridor    Heart:  Regular rate and rhythm, No murmur noted    Lungs:  No increased work of breathing, good air exchange, clear to auscultation bilaterally, no crackles or wheezing    Abdomen:  Soft, non-distended, non-tender, normal active bowel sounds, no masses palpated    ASSESSMENT AND PLAN    Patient is a 62 y.o. male with above specified procedure planned. 1.  Patient seen and focused exam done today- no new changes since last physical exam on 2/5/20    2. Access to ancillary services are available per request of the provider.     WAYNE Mar - CNP     2/20/2020

## 2020-02-20 NOTE — PROGRESS NOTES
NEUROSURGERY HANDOFF    Patient Information  Name:REKHA Chiang JRE:[id number]   :1960 Code Status:No Order   Allergies   Allergen Reactions    Penicillins Rash    Extended Emergency Contact Information  Primary Emergency Contact: Ella Puri  Address: [address] [place] [address] Phone: [phone]  Relation: Spouse     Admission Information  Date of Admission:2020  9:21 AM Location:OR/NONE   Admission Diagnosis:Spondylolisthesis, lumbar region [M43.16] Attending Physician: Dheeraj Ta MD   Procedure(s) (LRB):  L4, S1 ANTERIOR LUMBAR INTERBODY FUSION WITH PEDICLE SCREW FIXATION (N/A)  ANTERIOR RETROPERITONEAL EXPOSURE FOR ANTERIOR LUMBAR INTERBODY FUSION (N/A) Neurosurgeon: Dheeraj Ta MD     Patient Summary  Nestor Sharma is a 62 y.o. male patient with Spondylolisthesis, lumbar region [M43.16] who under went a Procedure(s) (LRB):  L4, S1 ANTERIOR LUMBAR INTERBODY FUSION WITH PEDICLE SCREW FIXATION (N/A)  ANTERIOR RETROPERITONEAL EXPOSURE FOR ANTERIOR LUMBAR INTERBODY FUSION (N/A) today by Dheeraj Ta MD.    History of Present Illness:  Patient had pre-op complaints of chronic lumbar pain and bilateral LE pain, numbness, tinging, and weakness that was unresponsive to conservative treatments.      Vital Signs:  BP (!) 158/104   Pulse 65   Temp 98.3 °F (36.8 °C) (Oral)   Resp 18   Ht 5' 9\" (1.753 m)   Wt 260 lb (117.9 kg)   SpO2 96%   BMI 38.40 kg/m²     Situation Awareness  Contingency Planning  If the patient has:  · LOC  · Sudden change in neuro exam that includes:   · Numbness or tingling in extremities  · Weakness in extremities  · Incision begins to separate  · Copious amount of blood or fluid draining from the incision  · Signs of infection, such as:   · Temperature exceeds 101° F  · Increased pain, swelling, warmth, or redness around incision site  · Red streaks leading from the site  · Pus draining from the site    You MUST contact Alexis Vigil MD at [phone] or by using [employer]    You can also contact the [facility] Neurosurgery NP Monday-Friday 7am-5pm @ [phone]    Electronically signed by WAYNE Schrader CNP on 2/20/2020 at 1:41 PM

## 2020-02-20 NOTE — OP NOTE
extrusion of the interbody. Dr. Crystal Owens then moved the retractors up to the L4-5 interspace and provided exposure and retraction of the vessels at this level. The procedure was then repeated wherein the disc was removed and the endplates were scraped. Again, the endplates were prepared with rasps and multiple trial interbodies were tested. A 13.5 mm graft with 10° of lordosis was selected, filled with Vivigen allograft, and placed in the L4-5 disc space under fluoroscopic guidance. An awl was used to prior to placement of 25 mm screws into the L5 body. Final lateral and AP fluoroscopy confirmed adequate placement. With the anterior portion completed, Dr. Crystal Owens performed the closure of the abdomen which will also be dictated in his operative note. When the abdomen was closed, the patient was carefully turned into a prone position on an open Deniz Lavender frame. The arms were positioned on armboards. All pressure points were appropriately padded. A small vertical linear incision over the left PSIS was planned. A time out was again completed and the lumbrosacral area was prepped and draped in the usual sterile fashion. A small linear incision measuring approximately 0.5 cm in size, was made overlying the left posterior superior iliac spine with a #10 scalpel. Dissection was carried down to the bone and a single post was placed as an attachment of the image guidance array. An O-arm intraoperative scan was then obtained with images transferred to the navigation unit for pedicle screw placement. Using a screw trajectory based on the neuronavigation, right and left vertical paraspinal incisions were planned parallel to each other encompassing the L4, L5, and S1 vertebral bodies. The skin was incised approximately 4 cm bilaterally using a #10 blade and the fascia was opened with Bovie electrocautery. Blunt dissection was then used to develop the plane between the multifidus and longissimus muscles.  We confirmed that I was present and scrubbed for all critical portions of the case. IMPLANTS:   1. DePuy Syncage Evolution spacer  L5-S1: 32 x 25 x 13.5 mm Small @ 14o   2. DePuy Synfix Evolution spacer  L4-5: 32 x 25 x 13.5 mm Small @ 10o   3. DePuy Synfix Evolution screws   L5: 25 mm x 2   4. DePuy Viper Prime Screw System   L4: 7.0 x 55 mm screw on the left, 7.0 x 50 mm screw on the right  L5: 7.0 x 45 mm screws bilaterally   S1: 7.0 x 45 mm screws bilaterally   5. DePuy Ti pre-bent 5.5 x 65 mm idalmis on the left and 5.5 x 70 mm idalmis on the right  6. Vivigen allograft    SPECIMEN:   None. DRAINS:   None. DISPOSITION:  PACU in stable condition.       COMPLICATIONS: NONE

## 2020-02-20 NOTE — ANESTHESIA PRE PROCEDURE
(ULTIVA) 2,000 mcg in sodium chloride 0.9 % 100 mL infusion  0.025 mcg/kg/min Intravenous Continuous Chle Garcia DO        lactated ringers infusion   Intravenous Continuous Callie Ray  mL/hr at 20 0957         Allergies: Allergies   Allergen Reactions    Penicillins Rash       Problem List:    Patient Active Problem List   Diagnosis Code    Essential hypertension, benign I10    Cholesteatoma H71.90    Erectile dysfunction N52.9    Plantar fasciitis M72.2    DDD (degenerative disc disease), lumbar M51.36    Low testosterone R79.89    GERD (gastroesophageal reflux disease) K21.9    Morbid obesity ([address] E66.01    Primary osteoarthritis of both knees M17.0    Primary osteoarthritis of left ankle M19.072    Hypercholesterolemia E78.00       Past Medical History:        Diagnosis Date    Cholesteatoma     DDD (degenerative disc disease), lumbar     Erectile dysfunction     Essential hypertension, benign     GERD (gastroesophageal reflux disease) 2014    Hypercholesterolemia     Low testosterone 2014    Morbid obesity ([address] 3/10/2016    Nontraumatic rupture of tendons of biceps (long head)     Plantar fasciitis     Primary osteoarthritis of both knees 2017    Primary osteoarthritis of left ankle 2017       Past Surgical History:        Procedure Laterality Date    BICEPS TENDON REPAIR Left     CATARACT REMOVAL      MIDDLE EAR SURGERY Right     TONSILLECTOMY AND ADENOIDECTOMY      VASECTOMY         Social History:    Social History     Tobacco Use    Smoking status: Former Smoker     Packs/day: 2.00     Years: 40.00     Pack years: 80.00     Types: Cigarettes     Last attempt to quit: 2012     Years since quittin.8    Smokeless tobacco: Never Used   Substance Use Topics    Alcohol use:  Yes     Alcohol/week: 16.7 standard drinks     Types: 20 Standard drinks or equivalent per week                                Counseling given: Not Answered      Vital Signs (Current):   Vitals:    02/20/20 0926   BP: (!) 158/104   Pulse: 65   Resp: 18   Temp: 98.3 °F (36.8 °C)   TempSrc: Oral   SpO2: 96%   Weight: 260 lb (117.9 kg)   Height: 5' 9\" (1.753 m)                                              BP Readings from Last 3 Encounters:   02/20/20 (!) 158/104   02/05/20 131/74   02/20/19 (!) 164/85       NPO Status: Time of last liquid consumption: 2230                        Time of last solid consumption: 1800                        Date of last liquid consumption: 02/19/20                        Date of last solid food consumption: 02/19/20    BMI:   Wt Readings from Last 3 Encounters:   02/20/20 260 lb (117.9 kg)   02/05/20 272 lb (123.4 kg)   02/20/19 269 lb (122 kg)     Body mass index is 38.4 kg/m². CBC:   Lab Results   Component Value Date    WBC 7.6 02/07/2020    RBC 5.10 02/07/2020    HGB 16.0 02/07/2020    HCT 48.0 02/07/2020    MCV 94.0 02/07/2020    RDW 13.3 02/07/2020     02/07/2020       CMP:   Lab Results   Component Value Date     02/07/2020    K 4.6 02/07/2020     02/07/2020    CO2 26 02/07/2020    BUN 15 02/07/2020    CREATININE 0.7 02/07/2020    GFRAA >60 02/07/2020    GFRAA >60 03/30/2012    AGRATIO 2.2 02/07/2020    LABGLOM >60 02/07/2020    GLUCOSE 102 02/07/2020    PROT 6.4 02/07/2020    PROT 6.8 03/30/2012    CALCIUM 9.3 02/07/2020    BILITOT <0.2 02/07/2020    ALKPHOS 50 02/07/2020    AST 26 02/07/2020    ALT 38 02/07/2020       POC Tests: No results for input(s): POCGLU, POCNA, POCK, POCCL, POCBUN, POCHEMO, POCHCT in the last 72 hours.     Coags:   Lab Results   Component Value Date    PROTIME 10.6 02/07/2020    INR 0.91 02/07/2020    APTT 31.3 02/07/2020       HCG (If Applicable): No results found for: PREGTESTUR, PREGSERUM, HCG, HCGQUANT     ABGs: No results found for: PHART, PO2ART, IKK0UJN, OSI9ZKC, BEART, Y1UGONMW     Type & Screen (If Applicable):  No results found for: LABABO, [address]    Anesthesia

## 2020-02-20 NOTE — PROGRESS NOTES
Patient admitted to PACU # 12 from OR at 1525 post  L4, S1 ANTERIOR LUMBAR INTERBODY FUSION WITH PEDICLE SCREW FIXATION       ANTERIOR RETROPERITONEAL EXPOSURE FOR ANTERIOR LUMBAR INTERBODY FUSION  per Dr. Diana Bunch.  Attached to PACU monitoring system and report received from anesthesia provider. Patient was reported to be hemodynamically stable during procedure. Patient drowsy on admission and having pain. Unable to get patient to calm down and relax, CRNA gave ketamine at bedside. Continue to monitor.

## 2020-02-20 NOTE — ANESTHESIA POSTPROCEDURE EVALUATION
Department of Anesthesiology  Postprocedure Note    Patient: Hari Duncan  MRN: [id number]  YOB: 1960  Date of evaluation: 2/20/2020  Time:  5:23 PM     Procedure Summary     Date:  02/20/20 Room / Location:  [facility] [address]    Anesthesia Start:  0983 Anesthesia Stop:  0353    Procedures:       L4, S1 ANTERIOR LUMBAR INTERBODY FUSION WITH PEDICLE SCREW FIXATION (N/A )      ANTERIOR RETROPERITONEAL EXPOSURE FOR ANTERIOR LUMBAR INTERBODY FUSION (N/A ) Diagnosis:       Spondylolisthesis, lumbar region      (Spondylolisthesis, lumbar region [M43.16])    Surgeon: Cesar Salazar MD; Scott Hayden MD Responsible Provider:  Amita Pastrana DO    Anesthesia Type:  general ASA Status:  2          Anesthesia Type: No value filed. Radha Phase I: Radha Score: 9    Radha Phase II:      Last vitals: Reviewed and per EMR flowsheets.        Anesthesia Post Evaluation    Patient location during evaluation: PACU  Patient participation: complete - patient participated  Level of consciousness: awake and alert  Pain score: 4  Airway patency: patent  Nausea & Vomiting: no nausea and no vomiting  Complications: no  Cardiovascular status: blood pressure returned to baseline  Respiratory status: acceptable  Hydration status: euvolemic

## 2020-02-21 PROBLEM — Z98.1 S/P LUMBAR FUSION: Status: ACTIVE | Noted: 2020-02-21

## 2020-02-21 LAB
ANION GAP SERPL CALCULATED.3IONS-SCNC: 16 MMOL/L (ref 3–16)
BUN BLDV-MCNC: 11 MG/DL (ref 7–20)
CALCIUM SERPL-MCNC: 8.9 MG/DL (ref 8.3–10.6)
CHLORIDE BLD-SCNC: 100 MMOL/L (ref 99–110)
CO2: 22 MMOL/L (ref 21–32)
CREAT SERPL-MCNC: 0.6 MG/DL (ref 0.8–1.3)
GFR AFRICAN AMERICAN: >60
GFR NON-AFRICAN AMERICAN: >60
GLUCOSE BLD-MCNC: 109 MG/DL (ref 70–99)
HCT VFR BLD CALC: 43.7 % (ref 40.5–52.5)
HEMOGLOBIN: 14.3 G/DL (ref 13.5–17.5)
MCH RBC QN AUTO: 31.4 PG (ref 26–34)
MCHC RBC AUTO-ENTMCNC: 32.7 G/DL (ref 31–36)
MCV RBC AUTO: 96.2 FL (ref 80–100)
PDW BLD-RTO: 13.1 % (ref 12.4–15.4)
PLATELET # BLD: 214 K/UL (ref 135–450)
PMV BLD AUTO: 8.2 FL (ref 5–10.5)
POTASSIUM SERPL-SCNC: 4 MMOL/L (ref 3.5–5.1)
RBC # BLD: 4.54 M/UL (ref 4.2–5.9)
SODIUM BLD-SCNC: 138 MMOL/L (ref 136–145)
WBC # BLD: 16 K/UL (ref 4–11)

## 2020-02-21 PROCEDURE — 2500000003 HC RX 250 WO HCPCS: Performed by: NEUROLOGICAL SURGERY

## 2020-02-21 PROCEDURE — 6360000002 HC RX W HCPCS: Performed by: NEUROLOGICAL SURGERY

## 2020-02-21 PROCEDURE — 97530 THERAPEUTIC ACTIVITIES: CPT

## 2020-02-21 PROCEDURE — 2580000003 HC RX 258: Performed by: ANESTHESIOLOGY

## 2020-02-21 PROCEDURE — 2580000003 HC RX 258: Performed by: NEUROLOGICAL SURGERY

## 2020-02-21 PROCEDURE — 85027 COMPLETE CBC AUTOMATED: CPT

## 2020-02-21 PROCEDURE — 97165 OT EVAL LOW COMPLEX 30 MIN: CPT

## 2020-02-21 PROCEDURE — 6370000000 HC RX 637 (ALT 250 FOR IP): Performed by: NURSE PRACTITIONER

## 2020-02-21 PROCEDURE — 6360000002 HC RX W HCPCS: Performed by: NURSE PRACTITIONER

## 2020-02-21 PROCEDURE — 97116 GAIT TRAINING THERAPY: CPT

## 2020-02-21 PROCEDURE — 36415 COLL VENOUS BLD VENIPUNCTURE: CPT

## 2020-02-21 PROCEDURE — 97535 SELF CARE MNGMENT TRAINING: CPT

## 2020-02-21 PROCEDURE — 6370000000 HC RX 637 (ALT 250 FOR IP): Performed by: NEUROLOGICAL SURGERY

## 2020-02-21 PROCEDURE — 97161 PT EVAL LOW COMPLEX 20 MIN: CPT

## 2020-02-21 PROCEDURE — 80048 BASIC METABOLIC PNL TOTAL CA: CPT

## 2020-02-21 PROCEDURE — 1200000000 HC SEMI PRIVATE

## 2020-02-21 RX ORDER — LIDOCAINE 4 G/G
2 PATCH TOPICAL DAILY
Qty: 14 PATCH | Refills: 0 | Status: SHIPPED | OUTPATIENT
Start: 2020-02-22 | End: 2020-02-29

## 2020-02-21 RX ORDER — SENNA AND DOCUSATE SODIUM 50; 8.6 MG/1; MG/1
1 TABLET, FILM COATED ORAL 2 TIMES DAILY
Qty: 60 TABLET | Refills: 0 | Status: SHIPPED | OUTPATIENT
Start: 2020-02-21 | End: 2020-03-22

## 2020-02-21 RX ORDER — OXYCODONE HYDROCHLORIDE 5 MG/1
5 TABLET ORAL EVERY 6 HOURS PRN
Qty: 28 TABLET | Refills: 0 | Status: SHIPPED | OUTPATIENT
Start: 2020-02-21 | End: 2020-02-28

## 2020-02-21 RX ORDER — DIAZEPAM 5 MG/1
5 TABLET ORAL EVERY 6 HOURS PRN
Qty: 24 TABLET | Refills: 0 | Status: SHIPPED | OUTPATIENT
Start: 2020-02-21 | End: 2020-03-02

## 2020-02-21 RX ORDER — ACETAMINOPHEN 500 MG
1000 TABLET ORAL EVERY 6 HOURS
Status: DISCONTINUED | OUTPATIENT
Start: 2020-02-21 | End: 2020-02-22 | Stop reason: HOSPADM

## 2020-02-21 RX ORDER — PANTOPRAZOLE SODIUM 40 MG/1
40 TABLET, DELAYED RELEASE ORAL
Status: DISCONTINUED | OUTPATIENT
Start: 2020-02-22 | End: 2020-02-22 | Stop reason: HOSPADM

## 2020-02-21 RX ORDER — LIDOCAINE 4 G/G
2 PATCH TOPICAL DAILY
Status: DISCONTINUED | OUTPATIENT
Start: 2020-02-21 | End: 2020-02-22 | Stop reason: HOSPADM

## 2020-02-21 RX ORDER — METHOCARBAMOL 750 MG/1
750 TABLET, FILM COATED ORAL 4 TIMES DAILY
Qty: 40 TABLET | Refills: 0 | Status: SHIPPED | OUTPATIENT
Start: 2020-02-21 | End: 2020-03-02

## 2020-02-21 RX ORDER — METOCLOPRAMIDE HYDROCHLORIDE 5 MG/ML
10 INJECTION INTRAMUSCULAR; INTRAVENOUS EVERY 6 HOURS
Status: DISPENSED | OUTPATIENT
Start: 2020-02-21 | End: 2020-02-22

## 2020-02-21 RX ADMIN — METOCLOPRAMIDE HYDROCHLORIDE 10 MG: 5 INJECTION INTRAMUSCULAR; INTRAVENOUS at 23:02

## 2020-02-21 RX ADMIN — Medication 10 ML: at 21:00

## 2020-02-21 RX ADMIN — HEPARIN SODIUM 5000 UNITS: 5000 INJECTION INTRAVENOUS; SUBCUTANEOUS at 20:51

## 2020-02-21 RX ADMIN — OXYCODONE 10 MG: 5 TABLET ORAL at 02:41

## 2020-02-21 RX ADMIN — OXYCODONE 10 MG: 5 TABLET ORAL at 21:06

## 2020-02-21 RX ADMIN — OXYCODONE 10 MG: 5 TABLET ORAL at 16:37

## 2020-02-21 RX ADMIN — OXYCODONE 10 MG: 5 TABLET ORAL at 12:30

## 2020-02-21 RX ADMIN — LISINOPRIL AND HYDROCHLOROTHIAZIDE 1 TABLET: 25; 20 TABLET ORAL at 09:25

## 2020-02-21 RX ADMIN — ACETAMINOPHEN 1000 MG: 500 TABLET ORAL at 16:37

## 2020-02-21 RX ADMIN — CLINDAMYCIN IN 5 PERCENT DEXTROSE 600 MG: 12 INJECTION, SOLUTION INTRAVENOUS at 10:44

## 2020-02-21 RX ADMIN — METHOCARBAMOL 1000 MG: 100 INJECTION, SOLUTION INTRAMUSCULAR; INTRAVENOUS at 08:06

## 2020-02-21 RX ADMIN — Medication 10 ML: at 09:27

## 2020-02-21 RX ADMIN — ACETAMINOPHEN 1000 MG: 500 TABLET ORAL at 23:02

## 2020-02-21 RX ADMIN — METOCLOPRAMIDE HYDROCHLORIDE 10 MG: 5 INJECTION INTRAMUSCULAR; INTRAVENOUS at 18:07

## 2020-02-21 RX ADMIN — CLINDAMYCIN IN 5 PERCENT DEXTROSE 600 MG: 12 INJECTION, SOLUTION INTRAVENOUS at 18:03

## 2020-02-21 RX ADMIN — CLINDAMYCIN IN 5 PERCENT DEXTROSE 600 MG: 12 INJECTION, SOLUTION INTRAVENOUS at 02:42

## 2020-02-21 RX ADMIN — ACETAMINOPHEN 1000 MG: 500 TABLET ORAL at 12:29

## 2020-02-21 RX ADMIN — OXYCODONE 5 MG: 5 TABLET ORAL at 08:06

## 2020-02-21 ASSESSMENT — PAIN DESCRIPTION - DESCRIPTORS
DESCRIPTORS: ACHING
DESCRIPTORS: ACHING;DISCOMFORT
DESCRIPTORS: ACHING;DISCOMFORT
DESCRIPTORS: ACHING
DESCRIPTORS: ACHING
DESCRIPTORS: ACHING;DISCOMFORT

## 2020-02-21 ASSESSMENT — ENCOUNTER SYMPTOMS
ABDOMINAL PAIN: 0
BACK PAIN: 0
SINUS PAIN: 0
WHEEZING: 0
PHOTOPHOBIA: 0
COUGH: 0
NAUSEA: 0
VOMITING: 0
SHORTNESS OF BREATH: 0
COLOR CHANGE: 0

## 2020-02-21 ASSESSMENT — PAIN SCALES - GENERAL
PAINLEVEL_OUTOF10: 5
PAINLEVEL_OUTOF10: 3
PAINLEVEL_OUTOF10: 5
PAINLEVEL_OUTOF10: 0
PAINLEVEL_OUTOF10: 7
PAINLEVEL_OUTOF10: 4
PAINLEVEL_OUTOF10: 5
PAINLEVEL_OUTOF10: 8
PAINLEVEL_OUTOF10: 7
PAINLEVEL_OUTOF10: 7

## 2020-02-21 ASSESSMENT — PAIN DESCRIPTION - ONSET
ONSET: ON-GOING

## 2020-02-21 ASSESSMENT — PAIN DESCRIPTION - FREQUENCY
FREQUENCY: CONTINUOUS

## 2020-02-21 ASSESSMENT — PAIN DESCRIPTION - ORIENTATION
ORIENTATION: LOWER

## 2020-02-21 ASSESSMENT — PAIN DESCRIPTION - PAIN TYPE
TYPE: SURGICAL PAIN

## 2020-02-21 ASSESSMENT — PAIN DESCRIPTION - PROGRESSION
CLINICAL_PROGRESSION: NOT CHANGED
CLINICAL_PROGRESSION: NOT CHANGED
CLINICAL_PROGRESSION: GRADUALLY WORSENING
CLINICAL_PROGRESSION: NOT CHANGED

## 2020-02-21 ASSESSMENT — PAIN DESCRIPTION - LOCATION
LOCATION: BACK

## 2020-02-21 ASSESSMENT — PAIN - FUNCTIONAL ASSESSMENT
PAIN_FUNCTIONAL_ASSESSMENT: PREVENTS OR INTERFERES SOME ACTIVE ACTIVITIES AND ADLS
PAIN_FUNCTIONAL_ASSESSMENT: PREVENTS OR INTERFERES SOME ACTIVE ACTIVITIES AND ADLS
PAIN_FUNCTIONAL_ASSESSMENT: PREVENTS OR INTERFERES WITH MANY ACTIVE NOT PASSIVE ACTIVITIES
PAIN_FUNCTIONAL_ASSESSMENT: PREVENTS OR INTERFERES SOME ACTIVE ACTIVITIES AND ADLS

## 2020-02-21 NOTE — PROGRESS NOTES
Patient alert and oriented x4, VSS, neuro WNL. Surgical sites to back and abdomen are clean, dry & intact, open to air and swabbed with CHG swabs this shift. Patient complains of pain - medicated per MAR with prn PO pain medication but refuses IV pain medication. Ice pack applied and repositioned for comfort. Patient up to chair much of shift - states bed is very uncomfortable. Patient tolerating ambulation with rolling walker and GB well and worked with PT/OT this shift. Patient tolerating diet and PO fluids well. Zarate out this morning - patient yet to void. All fall precautions in place. Will continue to monitor.

## 2020-02-21 NOTE — CONSULTS
distress. HENT:      Head: Normocephalic and atraumatic. Eyes:      Extraocular Movements: Extraocular movements intact. Pupils: Pupils are equal, round, and reactive to light. Neck:      Musculoskeletal: Normal range of motion and neck supple. Cardiovascular:      Rate and Rhythm: Normal rate and regular rhythm. Pulses: Normal pulses. Pulmonary:      Effort: Pulmonary effort is normal. No respiratory distress. Breath sounds: Normal breath sounds. No wheezing or rales. Abdominal:      General: Bowel sounds are normal.      Palpations: Abdomen is soft. Tenderness: There is no abdominal tenderness. Musculoskeletal: Normal range of motion. General: No swelling or tenderness. Right lower leg: No edema. Left lower leg: No edema. Skin:     General: Skin is warm and dry. Neurological:      General: No focal deficit present. Mental Status: He is alert and oriented to person, place, and time. Mental status is at baseline. Cranial Nerves: No cranial nerve deficit. Sensory: No sensory deficit. DATA:       Labs:  CBC:   Recent Labs     02/20/20  0935 02/21/20  0606   WBC 7.1 16.0*   HGB 15.4 14.3   HCT 46.4 43.7    214       BMP:   Recent Labs     02/21/20  0606      K 4.0      CO2 22   BUN 11   CREATININE 0.6*   GLUCOSE 109*       XR LUMBAR SPINE (2-3 VIEWS)   Final Result      7 intraoperative coned-down images from a procedure performed by the surgical service are submitted for review. 5 lumbar type vertebral bodies are assumed. There is P1-T7 metallic fixation with radiolucent intervertebral spacers. There is grade 1    retrolisthesis of L5. Fluoroscopy time 30 seconds      FLUORO FOR SURGICAL PROCEDURES   Final Result   Impression: Intraoperative CT and fluoroscopy utilized during lumbar spine surgery. XR LUMBAR SPINE (2-3 VIEWS)   Final Result   1. No suspicious radiopaque foreign body.             ASSESSMENT AND

## 2020-02-21 NOTE — PROGRESS NOTES
Patient alert and orientated. VSS. Neuro check stable, patient denies numbness/tingling. Patient pain controlled with PO pain medication. Surgical sites are clean, dry and intact, BERNARD with surgical glue. Patient denies nausea/vomiting. Zarate in place, draining appropriately. Fall precautions in place. Call light within reach. Will continue to monitor.

## 2020-02-21 NOTE — PROGRESS NOTES
Physical Therapy    Facility/Department: [facility] 5T ORTHO/NEURO  Initial Assessment    NAME: Milly Gonzalez  : 1960  MRN: [id number]    Date of Service: 2020    Discharge Recommendations: Milly Gonzalez scored a 18/24 on the AM-PAC short mobility form. Current research shows that an AM-PAC score of 18 or greater is typically associated with a discharge to the patient's home setting. PT Equipment Recommendations  Equipment Needed: (pt verbalizes he plans to buy cane from [employer] or get one from his neighbor)    Assessment   Assessment: Pt is a 61 yo male s/p L4, S1 ANTERIOR LUMBAR INTERBODY FUSION WITH PEDICLE SCREW FIXATION (N/A), ANTERIOR RETROPERITONEAL EXPOSURE FOR ANTERIOR LUMBAR INTERBODY FUSION (N/A). Per pt, he is from home where he is indep with mobility without AD at baseline. Pt demo ambulation with and without AD and can perform 5 steps up/down with single point cane without increases in pain. However, pt has difficulty with log roll technique and is hesitant to perform 2/2 pain. Pt plans to return home tomorrow with increased assist. Pt mobility presents below his reported baseline. If home, pt will benefit from increased assist and use of single point cane at all times initially. Treatment Diagnosis: mobility impairment s/p L4, S1 ANTERIOR LUMBAR INTERBODY FUSION WITH PEDICLE SCREW FIXATION (N/A), ANTERIOR RETROPERITONEAL EXPOSURE FOR ANTERIOR LUMBAR INTERBODY FUSION (N/A)  Decision Making: Low Complexity  PT Education: PT Role; Adaptive Device Training;Transfer Training;Gait Training;Plan of Care;Precautions; Functional Mobility Training  Patient Education: spinal precautions, log roll technique, d/c planning, use of single point cane at home - pt verbalized understanding  REQUIRES PT FOLLOW UP: Yes  Activity Tolerance  Activity Tolerance: Patient Tolerated treatment well;Patient limited by pain       Patient Diagnosis(es): The encounter diagnosis was S/P lumbar fusion.      has a past tile  Device 2: Single point cane  Assistance 2: Stand by assistance  Quality of Gait 2: slow carmel, decreased step height and length, L foot everted - per pt this is due to previous ankle issues and L foot is everted at baseline  Distance: 1x10'  Comments: pt verbalizes he feels just as stable with the cane as he did with the RW  Ambulation 3  Surface - 3: level tile  Device 3: No device  Assistance 3: Contact guard assistance;Stand by assistance(CGA progressed to SBA)  Quality of Gait 3: slow carmel, decreased step height and length, L foot everted - per pt this is due to previous ankle issues and L foot is everted at baseline  Distance: 1x40'  Comments: pt verbalizes he feels just as stable without AD as he did with AD  Stairs/Curb  Stairs?: Yes  Stairs  # Steps : (5 steps up/down)  Rails: None  Device: Single pt cane(in R hand)  Assistance: Contact guard assistance  Comment: pt verbalizes he has to perform stairs a certain way due to his L ankle eversion and demo step-to technique without observed unsteadiness or LOB     Balance  Posture: Good  Sitting - Static: Good  Sitting - Dynamic: Good  Standing - Static: Good  Standing - Dynamic: Good   Comments: pt did not tolerate prolonged sitting EOB 2/2 pain - only tolerated ~30 sec before asking to stand up or lay down in bed    Treatment included gait/transfer training, pt education.       Plan   Plan  Times per week: 5-7  Current Treatment Recommendations: Strengthening, Gait Training, Stair training, ROM, Balance Training, Transfer Training, Functional Mobility Training  Safety Devices  Type of devices: Call light within reach, Chair alarm in place, Patient at risk for falls, Nurse notified, Left in chair      AM-PAC Score  AM-PAC Inpatient Mobility Raw Score : 18 (02/21/20 1329)  AM-PAC Inpatient T-Scale Score : 43.63 (02/21/20 1329)  Mobility Inpatient CMS 0-100% Score: 46.58 (02/21/20 1329)  Mobility Inpatient CMS G-Code Modifier : CK (02/21/20 1329) Goals  Short term goals  Time Frame for Short term goals: d/c  Short term goal 1: perform supine <> sit using log roll technique indep  Short term goal 2: perform sit <> stand indep with cane  Short term goal 3: ambulate 100' indep with cane  Short term goal 4: ascend/descend 5 steps with S using cane  Short term goal 5: verbalize 3/3 spinal precautions  Patient Goals   Patient goals : return home       Therapy Time   Individual Concurrent Group Co-treatment   Time In 1137         Time Out 1215         Minutes 38           Timed Code Treatment Minutes: 28    Total Treatment Minutes: [address] [id number]      Therapist was present, directed patient's care, made skilled judgement and was responsible for assessment and treatment of patient. This note will serve as a d/c summary if pt is discharged from hospital before next treatment session.

## 2020-02-21 NOTE — PLAN OF CARE
Problem: Falls - Risk of:  Goal: Will remain free from falls  Description  Will remain free from falls  Outcome: Ongoing  Note:   Patient will remain free from falls. Patient alert and orientated and uses call light appropriately. Fall precautions in place. Bed locked and in lowest position, bed alarm on, nonskid socks on. Call light within reach. Problem: Pain:  Goal: Pain level will decrease  Description  Pain level will decrease  Outcome: Ongoing  Note:   Patient rating pain 7/10 and being controlled with oral pain medication. Patient currently resting in bed. Will continue to assess and monitor.

## 2020-02-21 NOTE — OP NOTE
fluoroscopy. The abdomen was then prepped  and draped in the usual sterile fashion. A left paramedian incision was  made and we dissected through the subcutaneous tissues and identified  the left anterior rectus sheath, which was incised. The left rectus  abdominis muscle was mobilized from the midline towards the left side. Retroperitoneum was entered in the left lower quadrant. Peritoneal  contents were swept towards the midline. Bookwalter retractor was  placed on the field. Bipolar cautery was used to free up the soft  tissue between the iliac veins. The middle sacral vessels were clipped  and ligated and we had good exposure of the L5-S1 disk space. The  patient had high aortic and vena caval bifurcation. I was able to  mobilize the left common iliac vein superiorly and laterally, and we  were able to get exposure of the L4-L5 disk space below the bifurcation. Disk markers were placed at both levels to confirm the level of the disk  spaces as well as midline. Once that was completed, diskectomy and cage  placement were carried out by Dr. Ludger Gilford at both levels. I then  carefully inspected the wound for hemostasis. When hemostasis was  ensured, I reapproximated the anterior rectus sheath with 0 PDS loop  sutures. Subcutaneous tissue was reapproximated in two layers with 3-0  Vicryl. Skin was closed with 4-0 Monocryl. Fluoroscopy sweep confirmed  no retained instruments or sponges. I stayed for the entire portion of  the anterior procedure and assisted Dr. Ludger Gilford with his portion of the  procedure, which included soft tissue and blood vessel retraction.         [facility]    D: 02/20/2020 21:45:54       T: 02/21/2020 0:35:18     [email or web address]  Job#: [id number]     Doc#: [id number]    CC:

## 2020-02-21 NOTE — PROGRESS NOTES
NEUROSURGERY POST-OP PROGRESS NOTE    Patient Name: Milly Gonzalez YOB: 1960   Sex: Male Age: 62 yrs     Medical Record Number: [id number] Acct Number: [de-identified]   Room Number: 4165/9311-56 Hospital Day: Hospital Day: 2     Interval History:  Post-operative Day# 1 s/p Procedure(s) (LRB):  L4, S1 ANTERIOR LUMBAR INTERBODY FUSION WITH PEDICLE SCREW FIXATION (N/A)  ANTERIOR RETROPERITONEAL EXPOSURE FOR ANTERIOR LUMBAR INTERBODY FUSION (N/A)    Subjective: PAtient c/o of back pain but no numbness or tingling or pain in his legs. Objective:    VITAL SIGNS   /74   Pulse 63   Temp 97.6 °F (36.4 °C) (Oral)   Resp 18   Ht 5' 9\" (1.753 m)   Wt 260 lb (117.9 kg)   SpO2 97%   BMI 38.40 kg/m²    Height Height: 5' 9\" (175.3 cm)   Weight Weight: 260 lb (117.9 kg)        Allergies Allergies   Allergen Reactions    Penicillins Rash      NPO Status DIET GENERAL;   Isolation No active isolations     LABS   Basic Metabolic Profile Recent Labs     02/21/20  0606         CO2 22   BUN 11   CREATININE 0.6*   GLUCOSE 109*      Complete Blood Count Recent Labs     02/20/20  0935 02/21/20  0606   WBC 7.1 16.0*   RBC 4.92 4.54      Coagulation Studies No results for input(s): PTT, INR in the last 72 hours.     Invalid input(s): PLATELETS, PROA, PT, PTTA     MEDICATIONS   Inpatient Medications     [START ON 2/22/2020] pantoprazole, 40 mg, Oral, QAM AC    sodium chloride flush, 10 mL, Intravenous, 2 times per day    lisinopril-hydroCHLOROthiazide, 1 tablet, Oral, Daily    sodium chloride flush, 10 mL, Intravenous, 2 times per day    clindamycin (CLEOCIN) IV, 600 mg, Intravenous, Q8H    sennosides-docusate sodium, 1 tablet, Oral, BID    heparin (porcine), 5,000 Units, Subcutaneous, Q8H   Infusions    sodium chloride 75 mL/hr at 02/20/20 1823      Antibiotics   Recent Abx Admin                   clindamycin (CLEOCIN) 600 mg in dextrose 5 % 50 mL IVPB (mg) 600 mg New Bag 02/21/20 0242     600 mg

## 2020-02-21 NOTE — PROGRESS NOTES
Occupational Therapy   Occupational Therapy Initial Assessment / Treatment session / Discharge  Date: 2020   Patient Name: Milly Gonzalez  MRN: [id number]     : 1960    Date of Service: 2020    Discharge Recommendations: Milly Gonzalez scored a 22/24 on the [facility] ADL Inpatient form. At this time, no further OT is recommended upon discharge. Recommend patient returns to prior setting with prior services. Home with assist PRN  OT Equipment Recommendations  Equipment Needed: No    Assessment   Performance deficits / Impairments: Decreased ADL status; Decreased endurance;Decreased high-level IADLs  Assessment: Pt is 62 y.o male admitted from home for planned lumbar spinal surgery. Pt did well during OT eval and plans to return home with assistance provided by wife as needed. Education provided regarding ADL adaptive strategies and safe transfer techniques. Pt with no further OT needs. Recommend discharge from OT -- pt in agreement with plan. Treatment Diagnosis: decreased ADLs and endurance  Prognosis: Good  Decision Making: Low Complexity  OT Education: OT Role;Precautions; ADL Adaptive Strategies  Patient Education: Pt educated on precautions, adaptive LE dressing strategies, purchase of bathroom DME, and role of OT - pt and wife verb understanding  No Skilled OT: No OT goals identified  REQUIRES OT FOLLOW UP: No  Activity Tolerance  Activity Tolerance: Patient Tolerated treatment well  Activity Tolerance: Pt motivated to stand and walk as much as possible during therapy  Safety Devices  Safety Devices in place: Yes  Type of devices: Call light within reach; Chair alarm in place; Left in chair           Patient Diagnosis(es): There were no encounter diagnoses.      has a past medical history of Cholesteatoma, DDD (degenerative disc disease), lumbar, Erectile dysfunction, Essential hypertension, benign, GERD (gastroesophageal reflux disease), Hypercholesterolemia, Low testosterone, Morbid obesity [facility]), Nontraumatic rupture of tendons of biceps (long head), Plantar fasciitis, Primary osteoarthritis of both knees, and Primary osteoarthritis of left ankle. has a past surgical history that includes Cataract removal; Middle ear surgery (Right); Vasectomy; Tonsillectomy and adenoidectomy; Biceps tendon repair (Left); and lumbar fusion (N/A, 2/20/2020). Treatment Diagnosis: decreased ADLs and endurance      Restrictions  Position Activity Restriction  Other position/activity restrictions: ambulate pt, activity as tolerated      Subjective   General  Chart Reviewed: Yes  Patient assessed for rehabilitation services?: Yes  Additional Pertinent Hx: Pt admitted 2/20 for spinal surgery. S/p L4, S1 ANTERIOR LUMBAR INTERBODY FUSION WITH PEDICLE SCREW FIXATION      PMHx: osteoarthritis, DDD, hypertension, biceps tendon repair (L)  Family / Caregiver Present: Yes(wife)  Diagnosis: Spondylolisthesis, lumbar region  Subjective  Subjective: Pt seated in recliner chair upon OT arrival. Pt pleasant and agreeable to OT eval. Pt stating \"I'd like to stand up and walk a bit. \"  Patient Currently in Pain: Yes(Pt stating he recently had pain meds -- RN aware)  Vital Signs  Patient Currently in Pain: Yes(Pt stating he recently had pain meds -- RN aware)       Social/Functional History  Social/Functional History  Lives With: Spouse  Type of Home: House  Home Layout: Two level(can live on first floor)  Home Access: Stairs to enter without rails  Entrance Stairs - Number of Steps: 5 EDWARD  Bathroom Shower/Tub: Tub/Shower unit  Bathroom Toilet: Standard  Bathroom Accessibility: Accessible  Receives Help From: Family  ADL Assistance: Independent  Homemaking Assistance: Independent  Homemaking Responsibilities: Yes  Ambulation Assistance: Independent  Transfer Assistance: Independent  Active : Yes  Mode of Transportation: Car  Occupation: Retired  Type of occupation:            Objective   Pt seen this session for functional transfer training, ADLs, and pt education. Vision: Within Functional Limits  Hearing: Within functional limits    Orientation  Overall Orientation Status: Within Normal Limits          Balance  Sitting Balance: Independent  Standing Balance: Stand by assistance(stance at sink)  Standing Balance  Time: 16 mins total  Activity: functional mobility and stance for ADLs  Comment: pt stating \"my back feels so much better when I'm standing\"  Functional Mobility  Functional - Mobility Device: Rolling Walker  Activity: To/from bathroom; Other(in hallway)  Assist Level: Contact guard assistance(progressing to SBA)  Functional Mobility Comments: pt ambulated to/from bathroom, in room, and ~75 ft in hallway w/RW  Toilet Transfers  Toilet - Technique: Ambulating  Equipment Used: Standard toilet  Toilet Transfer: Contact guard assistance(simulated)  Shower Transfers  Shower Transfers Comments: Pt and wife educated on safe shower transfer and purchase of DME if needed      ADL  Grooming: Stand by assistance(pt brushed teeth and washed face in stance at sink w/SBA)  Additional Comments: Pt denied wanting to change into clothes, changed gown w/mod A from wife -- wife very active in care and will be available to assist w/ADLs upon discharge. Pt educated on spinal precautions and use of figure 4 techique for LE dressing  Tone RUE  RUE Tone: Normotonic  Tone LUE  LUE Tone: Normotonic             Transfers  Sit to stand: Contact guard assistance  Stand to sit: Contact guard assistance  Transfer Comments: VCs for hand placement          Cognition  Overall Cognitive Status: WNL                 LUE AROM (degrees)  LUE AROM : WFL  Left Hand AROM (degrees)  Left Hand AROM: WFL  RUE AROM (degrees)  RUE AROM : WFL  Right Hand AROM (degrees)  Right Hand AROM: WFL  LUE Strength  Gross LUE Strength: WNL  RUE Strength  Gross RUE Strength: WNL                   Plan  D/C from acute OT services.                              AM-PAC Score        AM-PAC Inpatient Daily Activity Raw Score: 22 (02/21/20 1034)  AM-PAC Inpatient ADL T-Scale Score : 47.1 (02/21/20 1034)  ADL Inpatient CMS 0-100% Score: 25.8 (02/21/20 1034)  ADL Inpatient CMS G-Code Modifier : CJ (02/21/20 1034)    Goals          Therapy Time   Individual Concurrent Group Co-treatment   Time In 0934         Time Out 1006         Minutes 32         Treatment minutes: 17 minutes    Total treatment time: [place] [id number], [place]

## 2020-02-22 VITALS
TEMPERATURE: 97.9 F | BODY MASS INDEX: 38.51 KG/M2 | SYSTOLIC BLOOD PRESSURE: 104 MMHG | RESPIRATION RATE: 14 BRPM | HEIGHT: 69 IN | DIASTOLIC BLOOD PRESSURE: 67 MMHG | HEART RATE: 62 BPM | WEIGHT: 260 LBS | OXYGEN SATURATION: 96 %

## 2020-02-22 LAB
BASOPHILS ABSOLUTE: 0.1 K/UL (ref 0–0.2)
BASOPHILS RELATIVE PERCENT: 0.5 %
EOSINOPHILS ABSOLUTE: 0 K/UL (ref 0–0.6)
EOSINOPHILS RELATIVE PERCENT: 0.1 %
HCT VFR BLD CALC: 40.9 % (ref 40.5–52.5)
HEMOGLOBIN: 13.6 G/DL (ref 13.5–17.5)
LYMPHOCYTES ABSOLUTE: 2 K/UL (ref 1–5.1)
LYMPHOCYTES RELATIVE PERCENT: 15.2 %
MCH RBC QN AUTO: 31.2 PG (ref 26–34)
MCHC RBC AUTO-ENTMCNC: 33.3 G/DL (ref 31–36)
MCV RBC AUTO: 93.6 FL (ref 80–100)
MONOCYTES ABSOLUTE: 1.6 K/UL (ref 0–1.3)
MONOCYTES RELATIVE PERCENT: 12.1 %
NEUTROPHILS ABSOLUTE: 9.3 K/UL (ref 1.7–7.7)
NEUTROPHILS RELATIVE PERCENT: 72.1 %
PDW BLD-RTO: 13.4 % (ref 12.4–15.4)
PLATELET # BLD: 188 K/UL (ref 135–450)
PMV BLD AUTO: 8.3 FL (ref 5–10.5)
RBC # BLD: 4.37 M/UL (ref 4.2–5.9)
WBC # BLD: 12.9 K/UL (ref 4–11)

## 2020-02-22 PROCEDURE — 6360000002 HC RX W HCPCS: Performed by: NEUROLOGICAL SURGERY

## 2020-02-22 PROCEDURE — 2580000003 HC RX 258: Performed by: ANESTHESIOLOGY

## 2020-02-22 PROCEDURE — 85025 COMPLETE CBC W/AUTO DIFF WBC: CPT

## 2020-02-22 PROCEDURE — 2580000003 HC RX 258: Performed by: NEUROLOGICAL SURGERY

## 2020-02-22 PROCEDURE — 6360000002 HC RX W HCPCS: Performed by: NURSE PRACTITIONER

## 2020-02-22 PROCEDURE — 36415 COLL VENOUS BLD VENIPUNCTURE: CPT

## 2020-02-22 PROCEDURE — 2500000003 HC RX 250 WO HCPCS: Performed by: NEUROLOGICAL SURGERY

## 2020-02-22 PROCEDURE — 6370000000 HC RX 637 (ALT 250 FOR IP): Performed by: NEUROLOGICAL SURGERY

## 2020-02-22 PROCEDURE — 6370000000 HC RX 637 (ALT 250 FOR IP): Performed by: NURSE PRACTITIONER

## 2020-02-22 RX ADMIN — OXYCODONE 10 MG: 5 TABLET ORAL at 05:31

## 2020-02-22 RX ADMIN — BISACODYL 5 MG: 5 TABLET, COATED ORAL at 10:39

## 2020-02-22 RX ADMIN — OXYCODONE 10 MG: 5 TABLET ORAL at 01:12

## 2020-02-22 RX ADMIN — LISINOPRIL AND HYDROCHLOROTHIAZIDE 1 TABLET: 25; 20 TABLET ORAL at 08:25

## 2020-02-22 RX ADMIN — OXYCODONE 10 MG: 5 TABLET ORAL at 13:37

## 2020-02-22 RX ADMIN — CLINDAMYCIN IN 5 PERCENT DEXTROSE 600 MG: 12 INJECTION, SOLUTION INTRAVENOUS at 03:17

## 2020-02-22 RX ADMIN — ACETAMINOPHEN 1000 MG: 500 TABLET ORAL at 13:36

## 2020-02-22 RX ADMIN — HEPARIN SODIUM 5000 UNITS: 5000 INJECTION INTRAVENOUS; SUBCUTANEOUS at 05:31

## 2020-02-22 RX ADMIN — ACETAMINOPHEN 1000 MG: 500 TABLET ORAL at 05:31

## 2020-02-22 RX ADMIN — Medication 10 ML: at 08:26

## 2020-02-22 RX ADMIN — SENNOSIDES AND DOCUSATE SODIUM 1 TABLET: 8.6; 5 TABLET ORAL at 08:25

## 2020-02-22 RX ADMIN — CLINDAMYCIN IN 5 PERCENT DEXTROSE 600 MG: 12 INJECTION, SOLUTION INTRAVENOUS at 10:39

## 2020-02-22 RX ADMIN — METOCLOPRAMIDE HYDROCHLORIDE 10 MG: 5 INJECTION INTRAMUSCULAR; INTRAVENOUS at 05:31

## 2020-02-22 ASSESSMENT — PAIN DESCRIPTION - ORIENTATION
ORIENTATION: LOWER
ORIENTATION: LOWER

## 2020-02-22 ASSESSMENT — PAIN - FUNCTIONAL ASSESSMENT
PAIN_FUNCTIONAL_ASSESSMENT: PREVENTS OR INTERFERES SOME ACTIVE ACTIVITIES AND ADLS
PAIN_FUNCTIONAL_ASSESSMENT: PREVENTS OR INTERFERES SOME ACTIVE ACTIVITIES AND ADLS

## 2020-02-22 ASSESSMENT — PAIN SCALES - GENERAL
PAINLEVEL_OUTOF10: 7
PAINLEVEL_OUTOF10: 7
PAINLEVEL_OUTOF10: 0
PAINLEVEL_OUTOF10: 7

## 2020-02-22 ASSESSMENT — PAIN DESCRIPTION - LOCATION
LOCATION: BACK
LOCATION: BACK

## 2020-02-22 ASSESSMENT — PAIN DESCRIPTION - DESCRIPTORS
DESCRIPTORS: ACHING
DESCRIPTORS: ACHING

## 2020-02-22 ASSESSMENT — PAIN DESCRIPTION - ONSET
ONSET: ON-GOING
ONSET: ON-GOING

## 2020-02-22 ASSESSMENT — PAIN DESCRIPTION - PAIN TYPE
TYPE: SURGICAL PAIN
TYPE: SURGICAL PAIN

## 2020-02-22 ASSESSMENT — PAIN DESCRIPTION - FREQUENCY
FREQUENCY: CONTINUOUS
FREQUENCY: CONTINUOUS

## 2020-02-22 ASSESSMENT — PAIN DESCRIPTION - PROGRESSION
CLINICAL_PROGRESSION: NOT CHANGED
CLINICAL_PROGRESSION: NOT CHANGED

## 2020-02-22 NOTE — PROGRESS NOTES
Pt is alert and oriented times 4. VSS with exception to BP being soft at times. Surgical sites are CDI and pt denies any numbness and tingling. Pt ambulating well with one assist walker and gait belt. Pain is well controlled with PRN oxycodone. All fall precautions in place. Will continue to monitor.

## 2020-02-22 NOTE — PROGRESS NOTES
Patient currently resting in chair at bedside. Patient alert and oriented x 4 and in no distress. Chair alarm on and fall precautions taken. Nurse will continue to monitor/reassess patient. Call light within reach. Mari Rosenthal

## 2020-02-22 NOTE — DISCHARGE SUMMARY
Physician Discharge Summary     Patient ID:  Jericho Ahmadi  [id number]  62 y.o.  1960    Admit date: 2020    Discharge date and time: No discharge date for patient encounter. Admitting Physician: Olivia Vicente MD     Discharge Physician: [employer]    Admission Diagnoses: Spondylolisthesis, lumbar region [M43.16]  Spondylolisthesis at L4-L5 level [M43.16]    Discharge Diagnoses: Lumbar Spondylolisthesis    Admission Condition: good    Discharged Condition: good    Indication for Admission: Elective ALIF    Hospital Course: Mr Adelia Lopez is a pleasant 61 yo presenting for elective ALIF. He has done well, reports resolution of his lower extremity pain and has no apparent complication from surgery. He is taking a regular diet, voiding and ambulating independently. He will f/u with Dr. Alvina Gomez at scheduled appt. Significant Diagnostic Studies: radiology: CT scan: lumbar    Treatments: surgery: as above    Discharge Exam:  Physical Exam:    Temp (24hrs), Av.1 °F (36.7 °C), Min:97.9 °F (36.6 °C), Max:98.5 °F (36.9 °C)      Neuro Exam  The patient is well-appearing and is in no acute distress. Alert and oriented ×4, appropriate, conversant with normal mood and affect. DTR 2+/4 symmetric. Long sign neg BUE, Babinski sign is down-going BLE  Capillary refill is less than 2 s in all 4 extremities, pulses are strong and symmetric. Sensation is intact to light touch and temperature throughout. Romberg sign is negative. The patient walks with a well-balanced, well-coordinated gait.       RUE: Deltoids: 5/5, Triceps: 5/5, Biceps: 5/5, WE/WF: 5/5, Finger abd: 5/5, : 5/5   LUE: Deltoids: 5/5, Triceps: 5/5, Biceps: 5/5, WE/WF: 5/5, Finger abd: 5/5, : 5/5  LLE: HE: 5/5, HF: 5/5, KE:5/5, KF: 5/5, PF: 5/5, DF: 55  RLE: HE: 5/5, HF: 5/5, KE:5/5, KF: 5/5, PF: , DF:     Incision C/D/I    Labs:  Recent Labs     20  0612   WBC 12.9*   HGB 13.6   HCT 40.9          Recent Labs

## 2020-03-11 RX ORDER — LISINOPRIL AND HYDROCHLOROTHIAZIDE 25; 20 MG/1; MG/1
TABLET ORAL
Qty: 90 TABLET | Refills: 1 | Status: SHIPPED | OUTPATIENT
Start: 2020-03-11 | End: 2020-10-01

## 2020-10-01 RX ORDER — LISINOPRIL AND HYDROCHLOROTHIAZIDE 25; 20 MG/1; MG/1
TABLET ORAL
Qty: 90 TABLET | Refills: 1 | Status: SHIPPED | OUTPATIENT
Start: 2020-10-01 | End: 2021-04-14

## 2021-01-06 RX ORDER — OMEPRAZOLE 20 MG/1
CAPSULE, DELAYED RELEASE ORAL
Qty: 90 CAPSULE | Refills: 2 | Status: SHIPPED | OUTPATIENT
Start: 2021-01-06 | End: 2022-03-21

## 2021-01-06 NOTE — TELEPHONE ENCOUNTER
Requested Prescriptions     Pending Prescriptions Disp Refills    omeprazole (PRILOSEC) 20 MG delayed release capsule [Pharmacy Med Name: OMEPRAZOLE DR 20 MG CAPSULE] 90 capsule 2     Sig: TAKE 1 CAPSULE BY MOUTH EVERY DAY     Last ov 02/05/20  Last labs 02/22/20

## 2021-04-14 RX ORDER — LISINOPRIL AND HYDROCHLOROTHIAZIDE 25; 20 MG/1; MG/1
TABLET ORAL
Qty: 90 TABLET | Refills: 1 | Status: SHIPPED | OUTPATIENT
Start: 2021-04-14 | End: 2021-10-18

## 2021-04-14 NOTE — TELEPHONE ENCOUNTER
Requested Prescriptions     Pending Prescriptions Disp Refills    lisinopril-hydroCHLOROthiazide (PRINZIDE;ZESTORETIC) 20-25 MG per tablet [Pharmacy Med Name: LISINOPRIL-HCTZ 20-25 MG TAB] 90 tablet 1     Sig: TAKE 1 TABLET BY MOUTH EVERY DAY     Last ov 2/5/20  Last labs 2/21/20

## 2021-07-20 ENCOUNTER — OFFICE VISIT (OUTPATIENT)
Dept: FAMILY MEDICINE CLINIC | Age: 61
End: 2021-07-20
Payer: COMMERCIAL

## 2021-07-20 VITALS
RESPIRATION RATE: 16 BRPM | SYSTOLIC BLOOD PRESSURE: 126 MMHG | HEART RATE: 69 BPM | OXYGEN SATURATION: 94 % | TEMPERATURE: 97.9 F | BODY MASS INDEX: 43.93 KG/M2 | HEIGHT: 69 IN | WEIGHT: 296.6 LBS | DIASTOLIC BLOOD PRESSURE: 72 MMHG

## 2021-07-20 DIAGNOSIS — I10 ESSENTIAL HYPERTENSION, BENIGN: ICD-10-CM

## 2021-07-20 DIAGNOSIS — Z12.11 SCREEN FOR COLON CANCER: ICD-10-CM

## 2021-07-20 DIAGNOSIS — Z00.00 ROUTINE GENERAL MEDICAL EXAMINATION AT A HEALTH CARE FACILITY: Primary | ICD-10-CM

## 2021-07-20 DIAGNOSIS — M51.36 DDD (DEGENERATIVE DISC DISEASE), LUMBAR: ICD-10-CM

## 2021-07-20 DIAGNOSIS — M19.072 PRIMARY OSTEOARTHRITIS OF LEFT ANKLE: ICD-10-CM

## 2021-07-20 DIAGNOSIS — R06.83 SNORING: ICD-10-CM

## 2021-07-20 DIAGNOSIS — L57.0 AK (ACTINIC KERATOSIS): ICD-10-CM

## 2021-07-20 DIAGNOSIS — E78.00 HYPERCHOLESTEROLEMIA: ICD-10-CM

## 2021-07-20 DIAGNOSIS — M17.0 PRIMARY OSTEOARTHRITIS OF BOTH KNEES: ICD-10-CM

## 2021-07-20 DIAGNOSIS — F43.9 STRESS: ICD-10-CM

## 2021-07-20 DIAGNOSIS — R63.5 WEIGHT GAIN: ICD-10-CM

## 2021-07-20 DIAGNOSIS — E66.01 MORBID OBESITY (HCC): ICD-10-CM

## 2021-07-20 DIAGNOSIS — Z87.891 PERSONAL HISTORY OF TOBACCO USE: ICD-10-CM

## 2021-07-20 DIAGNOSIS — Z00.00 ROUTINE GENERAL MEDICAL EXAMINATION AT A HEALTH CARE FACILITY: ICD-10-CM

## 2021-07-20 LAB
A/G RATIO: 2 (ref 1.1–2.2)
ALBUMIN SERPL-MCNC: 4.3 G/DL (ref 3.4–5)
ALP BLD-CCNC: 61 U/L (ref 40–129)
ALT SERPL-CCNC: 21 U/L (ref 10–40)
ANION GAP SERPL CALCULATED.3IONS-SCNC: 14 MMOL/L (ref 3–16)
AST SERPL-CCNC: 20 U/L (ref 15–37)
BILIRUB SERPL-MCNC: 0.3 MG/DL (ref 0–1)
BUN BLDV-MCNC: 16 MG/DL (ref 7–20)
CALCIUM SERPL-MCNC: 8.9 MG/DL (ref 8.3–10.6)
CHLORIDE BLD-SCNC: 101 MMOL/L (ref 99–110)
CHOLESTEROL, TOTAL: 210 MG/DL (ref 0–199)
CO2: 24 MMOL/L (ref 21–32)
CREAT SERPL-MCNC: 0.7 MG/DL (ref 0.8–1.3)
GFR AFRICAN AMERICAN: >60
GFR NON-AFRICAN AMERICAN: >60
GLOBULIN: 2.2 G/DL
GLUCOSE BLD-MCNC: 83 MG/DL (ref 70–99)
HCT VFR BLD CALC: 46.9 % (ref 40.5–52.5)
HDLC SERPL-MCNC: 42 MG/DL (ref 40–60)
HEMOGLOBIN: 16 G/DL (ref 13.5–17.5)
LDL CHOLESTEROL CALCULATED: 145 MG/DL
MCH RBC QN AUTO: 31.6 PG (ref 26–34)
MCHC RBC AUTO-ENTMCNC: 34.1 G/DL (ref 31–36)
MCV RBC AUTO: 92.6 FL (ref 80–100)
PDW BLD-RTO: 13.8 % (ref 12.4–15.4)
PLATELET # BLD: 221 K/UL (ref 135–450)
PMV BLD AUTO: 8.6 FL (ref 5–10.5)
POTASSIUM SERPL-SCNC: 4.3 MMOL/L (ref 3.5–5.1)
RBC # BLD: 5.07 M/UL (ref 4.2–5.9)
SODIUM BLD-SCNC: 139 MMOL/L (ref 136–145)
TOTAL PROTEIN: 6.5 G/DL (ref 6.4–8.2)
TRIGL SERPL-MCNC: 114 MG/DL (ref 0–150)
URIC ACID, SERUM: 7.7 MG/DL (ref 3.5–7.2)
VLDLC SERPL CALC-MCNC: 23 MG/DL
WBC # BLD: 9.9 K/UL (ref 4–11)

## 2021-07-20 PROCEDURE — G0296 VISIT TO DETERM LDCT ELIG: HCPCS | Performed by: FAMILY MEDICINE

## 2021-07-20 PROCEDURE — 99396 PREV VISIT EST AGE 40-64: CPT | Performed by: FAMILY MEDICINE

## 2021-07-20 PROCEDURE — 17000 DESTRUCT PREMALG LESION: CPT | Performed by: FAMILY MEDICINE

## 2021-07-20 RX ORDER — BETAMETHASONE DIPROPIONATE 0.5 MG/G
CREAM TOPICAL
Qty: 50 G | Refills: 0 | Status: SHIPPED | OUTPATIENT
Start: 2021-07-20 | End: 2021-08-19

## 2021-07-20 SDOH — ECONOMIC STABILITY: FOOD INSECURITY: WITHIN THE PAST 12 MONTHS, YOU WORRIED THAT YOUR FOOD WOULD RUN OUT BEFORE YOU GOT MONEY TO BUY MORE.: NEVER TRUE

## 2021-07-20 SDOH — ECONOMIC STABILITY: FOOD INSECURITY: WITHIN THE PAST 12 MONTHS, THE FOOD YOU BOUGHT JUST DIDN'T LAST AND YOU DIDN'T HAVE MONEY TO GET MORE.: NEVER TRUE

## 2021-07-20 SDOH — ECONOMIC STABILITY: TRANSPORTATION INSECURITY
IN THE PAST 12 MONTHS, HAS THE LACK OF TRANSPORTATION KEPT YOU FROM MEDICAL APPOINTMENTS OR FROM GETTING MEDICATIONS?: NO

## 2021-07-20 SDOH — ECONOMIC STABILITY: TRANSPORTATION INSECURITY
IN THE PAST 12 MONTHS, HAS LACK OF TRANSPORTATION KEPT YOU FROM MEETINGS, WORK, OR FROM GETTING THINGS NEEDED FOR DAILY LIVING?: NO

## 2021-07-20 ASSESSMENT — PATIENT HEALTH QUESTIONNAIRE - PHQ9
SUM OF ALL RESPONSES TO PHQ QUESTIONS 1-9: 18
SUM OF ALL RESPONSES TO PHQ QUESTIONS 1-9: 18
6. FEELING BAD ABOUT YOURSELF - OR THAT YOU ARE A FAILURE OR HAVE LET YOURSELF OR YOUR FAMILY DOWN: 3
SUM OF ALL RESPONSES TO PHQ9 QUESTIONS 1 & 2: 6
7. TROUBLE CONCENTRATING ON THINGS, SUCH AS READING THE NEWSPAPER OR WATCHING TELEVISION: 0
5. POOR APPETITE OR OVEREATING: 3
3. TROUBLE FALLING OR STAYING ASLEEP: 3
8. MOVING OR SPEAKING SO SLOWLY THAT OTHER PEOPLE COULD HAVE NOTICED. OR THE OPPOSITE, BEING SO FIGETY OR RESTLESS THAT YOU HAVE BEEN MOVING AROUND A LOT MORE THAN USUAL: 0
2. FEELING DOWN, DEPRESSED OR HOPELESS: 3
10. IF YOU CHECKED OFF ANY PROBLEMS, HOW DIFFICULT HAVE THESE PROBLEMS MADE IT FOR YOU TO DO YOUR WORK, TAKE CARE OF THINGS AT HOME, OR GET ALONG WITH OTHER PEOPLE: 2
4. FEELING TIRED OR HAVING LITTLE ENERGY: 3
1. LITTLE INTEREST OR PLEASURE IN DOING THINGS: 3
SUM OF ALL RESPONSES TO PHQ QUESTIONS 1-9: 18
9. THOUGHTS THAT YOU WOULD BE BETTER OFF DEAD, OR OF HURTING YOURSELF: 0

## 2021-07-20 ASSESSMENT — COLUMBIA-SUICIDE SEVERITY RATING SCALE - C-SSRS
2. HAVE YOU ACTUALLY HAD ANY THOUGHTS OF KILLING YOURSELF?: NO
6. HAVE YOU EVER DONE ANYTHING, STARTED TO DO ANYTHING, OR PREPARED TO DO ANYTHING TO END YOUR LIFE?: NO
1. WITHIN THE PAST MONTH, HAVE YOU WISHED YOU WERE DEAD OR WISHED YOU COULD GO TO SLEEP AND NOT WAKE UP?: NO

## 2021-07-20 ASSESSMENT — SOCIAL DETERMINANTS OF HEALTH (SDOH): HOW HARD IS IT FOR YOU TO PAY FOR THE VERY BASICS LIKE FOOD, HOUSING, MEDICAL CARE, AND HEATING?: NOT HARD AT ALL

## 2021-07-20 NOTE — PROGRESS NOTES
Here for well checkup, physical.  Pt staes that things are doing ok, states that stress levels are high as his mother-in-law moved in related to dementia issues. Pt states that it has affect his mood, as it is full time work and wife works, he is there all the time. He is helping with her ADL's, getting dressed and bathing. Pt feels that he is doing ok to managing. Pt does feel that he has a good support system with wife. Not at point where he needs to discuss meds or counseling. Pt is very sedentary due to his osteoarthritis issues, ankle issues. Pt had lumbar surgery 2/2020. Pt has done well to recover from that and is happy with the benfit of the surgery. Pt done with all physical therapy at this time. Pt has not seen anyone for his knees/ankles at this time. Pt is able to get out and golf as much as he can, but feels limited by his chronic ankle pain. Pt is paying attention to diet, but states that he has to stay under 1000 calories to lose any weight. Pt does sleep ok, pt does snore moderately. Pt has concerns of obstructive sleep apnea, pt is aware. Except as noted in the history of present illness as above, the review of systems is negative for the following:    General ROS: negative  Psychological ROS: negative  Allergy and Immunology ROS: negative  Hematological and Lymphatic ROS: negative  Respiratory ROS: no cough, shortness of breath, or wheezing  Cardiovascular ROS: no chest pain or dyspnea on exertion  Gastrointestinal ROS: no abdominal pain, change in bowel habits, or black or bloody stools  Genito-Urinary ROS: no dysuria, trouble voiding, or hematuria  Musculoskeletal ROS: negative  Dermatological ROS: negative      Past medical, surgical, and social history reviewed and updated.    Medications and allergies reviewed and updated      /72   Pulse 69   Temp 97.9 °F (36.6 °C) (Temporal)   Resp 16   Ht 5' 9\" (1.753 m)   Wt 296 lb 9.6 oz (134.5 kg)   SpO2 94%   BMI Pt voiced understanding. When applicable, patient's outside records were reviewed through [facility]. The patient has signed appropriate paperworks/consents. Low Dose CT (LDCT) Lung Screening criteria met   Age 50-69   Pack year smoking >30   Still smoking or less than 15 year since quit   No sign or symptoms of lung cancer   > 11 months since last LDCT     Risks and benefits of lung cancer screening with LDCT scans discussed:    Significance of positive screen - False-positive LDCT results often occur. 95% of all positive results do not lead to a diagnosis of cancer. Usually further imaging can resolve most false-positive results; however, some patients may require invasive procedures. Over diagnosis risk - 10% to 12% of screen-detected lung cancer cases are over diagnosedthat is, the cancer would not have been detected in the patient's lifetime without the screening. Need for follow up screens annually to continue lung cancer screening effectiveness     Risks associated with radiation from annual LDCT- Radiation exposure is about the same as for a mammogram, which is about 1/3 of the annual background radiation exposure from everyday life. Starting screening at age 54 is not likely to increase cancer risk from radiation exposure. Patients with comorbidities resulting in life expectancy of < 10 years, or that would preclude treatment of an abnormality identified on CT, should not be screened due to lack of benefit.     To obtain maximal benefit from this screening, smoking cessation and long-term abstinence from smoking is critical

## 2021-07-21 LAB
ESTIMATED AVERAGE GLUCOSE: 125.5 MG/DL
HBA1C MFR BLD: 6 %
PROSTATE SPECIFIC ANTIGEN: 0.4 NG/ML (ref 0–4)
TSH SERPL DL<=0.05 MIU/L-ACNC: 2.02 UIU/ML (ref 0.27–4.2)

## 2021-08-12 ENCOUNTER — OFFICE VISIT (OUTPATIENT)
Dept: ORTHOPEDIC SURGERY | Age: 61
End: 2021-08-12
Payer: COMMERCIAL

## 2021-08-12 VITALS — WEIGHT: 294 LBS | BODY MASS INDEX: 43.55 KG/M2 | HEIGHT: 69 IN

## 2021-08-12 DIAGNOSIS — M76.822 POSTERIOR TIBIAL TENDON DYSFUNCTION (PTTD) OF LEFT LOWER EXTREMITY: Primary | ICD-10-CM

## 2021-08-12 PROCEDURE — 99243 OFF/OP CNSLTJ NEW/EST LOW 30: CPT | Performed by: ORTHOPAEDIC SURGERY

## 2021-08-12 PROCEDURE — L1902 AFO ANKLE GAUNTLET PRE OTS: HCPCS | Performed by: ORTHOPAEDIC SURGERY

## 2021-08-13 ENCOUNTER — OFFICE VISIT (OUTPATIENT)
Dept: FAMILY MEDICINE CLINIC | Age: 61
End: 2021-08-13
Payer: COMMERCIAL

## 2021-08-13 VITALS
RESPIRATION RATE: 12 BRPM | WEIGHT: 293.5 LBS | OXYGEN SATURATION: 96 % | DIASTOLIC BLOOD PRESSURE: 72 MMHG | TEMPERATURE: 97.1 F | HEART RATE: 57 BPM | SYSTOLIC BLOOD PRESSURE: 116 MMHG | BODY MASS INDEX: 43.34 KG/M2

## 2021-08-13 DIAGNOSIS — M19.072 PRIMARY OSTEOARTHRITIS OF LEFT ANKLE: ICD-10-CM

## 2021-08-13 DIAGNOSIS — R73.9 HYPERGLYCEMIA: ICD-10-CM

## 2021-08-13 DIAGNOSIS — E78.00 HYPERCHOLESTEROLEMIA: ICD-10-CM

## 2021-08-13 DIAGNOSIS — I10 ESSENTIAL HYPERTENSION, BENIGN: Primary | ICD-10-CM

## 2021-08-13 PROCEDURE — 99214 OFFICE O/P EST MOD 30 MIN: CPT | Performed by: FAMILY MEDICINE

## 2021-08-13 RX ORDER — ATORVASTATIN CALCIUM 20 MG/1
20 TABLET, FILM COATED ORAL DAILY
Qty: 30 TABLET | Refills: 5 | Status: SHIPPED | OUTPATIENT
Start: 2021-08-13 | End: 2021-11-15

## 2021-08-13 NOTE — PROGRESS NOTES
Here for f/u and recheck of blood sugars, blood pressure    Pt here for follow up of blood pressure. Pt states doing great with adherence to therapy and feels well. No issues of chest pain, shortness of breath. No vision changes, headache, swelling in legs. Here for f/u of cholesterol. Pt as been working on diet/exercise and is consistent with therapy. No side effects from current therapy. No chest pain, shortness of breath. No numbness/tingling in extremities. Pt does have some trending blood sugars, with a1c increasing to 6.0. Pt has issues with diet/exercise     The 10-year ASCVD risk score (Jillian Copeland, et al., 2013) is: 12.5%    Values used to calculate the score:      Age: 59 years      Sex: Male      Is Non- : No      Diabetic: No      Tobacco smoker: No      Systolic Blood Pressure: 070 mmHg      Is BP treated: Yes      HDL Cholesterol: 42 mg/dL      Total Cholesterol: 210 mg/dL       O: /72   Pulse 57   Temp 97.1 °F (36.2 °C) (Temporal)   Resp 12   Wt 293 lb 8 oz (133.1 kg)   SpO2 96%   BMI 43.34 kg/m²   GEN: No acute distress, cooperative, well nourished, alert. HEENT: PEERLA, EOMI , normocephalic/atraumatic, nares and oropharynx clear. Mucous membranes normal, Tympanic membranes clear bilaterally. Neck: soft, supple, no thyromegaly, mass, no Lymphadenopathy  CV: Regular rate and rhythm, no murmur, rubs, gallops. No edema. Resp: Clear to auscultation bilaterally good air entry bilaterally  No crackles, wheeze. Breathing comfortably.    Psych: mood stable, No suicidal thoughts or ideation       Orders Only on 07/20/2021   Component Date Value Ref Range Status    Uric Acid, Serum 07/20/2021 7.7* 3.5 - 7.2 mg/dL Final    TSH 07/20/2021 2.02  0.27 - 4.20 uIU/mL Final    PSA 07/20/2021 0.40  0.00 - 4.00 ng/mL Final    Hemoglobin A1C 07/20/2021 6.0  See comment % Final    Comment: Comment:  Diagnosis of Diabetes: > or = 6.5%  Increased risk of diabetes (Prediabetes): 5.7-6.4%  Glycemic Control: Nonpregnant Adults: <7.0%                    Pregnant: <6.0%        eAG 07/20/2021 125.5  mg/dL Final    Cholesterol, Total 07/20/2021 210* 0 - 199 mg/dL Final    Triglycerides 07/20/2021 114  0 - 150 mg/dL Final    HDL 07/20/2021 42  40 - 60 mg/dL Final    LDL Calculated 07/20/2021 145* <100 mg/dL Final    VLDL Cholesterol Calculated 07/20/2021 23  Not Established mg/dL Final    Sodium 07/20/2021 139  136 - 145 mmol/L Final    Potassium 07/20/2021 4.3  3.5 - 5.1 mmol/L Final    Chloride 07/20/2021 101  99 - 110 mmol/L Final    CO2 07/20/2021 24  21 - 32 mmol/L Final    Anion Gap 07/20/2021 14  3 - 16 Final    Glucose 07/20/2021 83  70 - 99 mg/dL Final    BUN 07/20/2021 16  7 - 20 mg/dL Final    CREATININE 07/20/2021 0.7* 0.8 - 1.3 mg/dL Final    GFR Non- 07/20/2021 >60  >60 Final    Comment: >60 mL/min/1.73m2 EGFR, calc. for ages 25 and older using the  MDRD formula (not corrected for weight), is valid for stable  renal function.  GFR  07/20/2021 >60  >60 Final    Comment: Chronic Kidney Disease: less than 60 ml/min/1.73 sq.m. Kidney Failure: less than 15 ml/min/1.73 sq.m. Results valid for patients 18 years and older.       Calcium 07/20/2021 8.9  8.3 - 10.6 mg/dL Final    Total Protein 07/20/2021 6.5  6.4 - 8.2 g/dL Final    Albumin 07/20/2021 4.3  3.4 - 5.0 g/dL Final    Albumin/Globulin Ratio 07/20/2021 2.0  1.1 - 2.2 Final    Total Bilirubin 07/20/2021 0.3  0.0 - 1.0 mg/dL Final    Alkaline Phosphatase 07/20/2021 61  40 - 129 U/L Final    ALT 07/20/2021 21  10 - 40 U/L Final    AST 07/20/2021 20  15 - 37 U/L Final    Globulin 07/20/2021 2.2  g/dL Final    WBC 07/20/2021 9.9  4.0 - 11.0 K/uL Final    RBC 07/20/2021 5.07  4.20 - 5.90 M/uL Final    Hemoglobin 07/20/2021 16.0  13.5 - 17.5 g/dL Final    Hematocrit 07/20/2021 46.9  40.5 - 52.5 % Final    MCV 07/20/2021 92.6  80.0 - 100.0 fL Final    MCH 07/20/2021 31.6  26.0 - 34.0 pg Final    MCHC 07/20/2021 34.1  31.0 - 36.0 g/dL Final    RDW 07/20/2021 13.8  12.4 - 15.4 % Final    Platelets 46/62/4535 221  135 - 450 K/uL Final    MPV 07/20/2021 8.6  5.0 - 10.5 fL Final          ASSESSMENT / PLAN:    1. Essential hypertension, benign  blood pressure stable @ goal, controlled    2. Hypercholesterolemia  Persistent elevation of LDL  High ASCVD risk  Check CT calcium score  Start lipitor 20mg qd  Discussed use, benefit, and side effects of prescribed medications. Barriers to medication compliance addressed. All patient questions answered. Pt voiced understanding.   - Comprehensive Metabolic Panel; Future  - Lipid Panel; Future    3. Hyperglycemia  At risk of diabetes mellitus, with progressive increase in blood sugars  a1c at 6.0  Start metformin 500mg qd and monitor with diet/exercise and weight loss attempts  Check f/u bloodwork 3 months  - Hemoglobin A1C; Future    4. Primary osteoarthritis of left ankle  Cont f/u with ortho           Follow-up appointment:   3 mos/prn    Discussed use, benefit, and side effects of all prescribed medications. Barriers to medication compliance addressed. All patient questions answered. Pt voiced understanding. When applicable, patient's outside records were reviewed through [place]. The patient has signed appropriate paperworks/consents.

## 2021-08-14 PROBLEM — M76.822 POSTERIOR TIBIAL TENDON DYSFUNCTION (PTTD) OF LEFT LOWER EXTREMITY: Status: ACTIVE | Noted: 2021-08-14

## 2021-08-15 NOTE — PROGRESS NOTES
CHIEF COMPLAINT: Left posterior-medial ankle pain/posterior tibial tendenosis. HISTORY:  Mr. Manley Company 59 y.o.  male presents today for consultation request from Neeru Arambula MD for evaluation of left posterior-medial ankle pain which started 10 years ago.  He is complaining of sharp pain. Pain is increase with standing and walking and shoe wear. Pain is sharp with first few steps, dull achy pain by the end of the day. The pain radiates to medial leg, and no numbness and tingling sensation. No other complaint.       Past Medical History:   Diagnosis Date    Cholesteatoma     DDD (degenerative disc disease), lumbar     Erectile dysfunction     Essential hypertension, benign     GERD (gastroesophageal reflux disease) 2014    Hypercholesterolemia     Hyperglycemia 2021    Low testosterone 2014    Morbid obesity ([facility] [address] 3/10/2016    Nontraumatic rupture of tendons of biceps (long head)     Plantar fasciitis     Primary osteoarthritis of both knees 2017    Primary osteoarthritis of left ankle 2017       Past Surgical History:   Procedure Laterality Date    BICEPS TENDON REPAIR Left     CATARACT REMOVAL      LUMBAR FUSION N/A 2020    L4, S1 ANTERIOR LUMBAR INTERBODY FUSION WITH PEDICLE SCREW FIXATION performed by Glen Marie MD at [address] [place] [address] Right     TONSILLECTOMY AND ADENOIDECTOMY      VASECTOMY         Social History     Socioeconomic History    Marital status:      Spouse name: Not on file    Number of children: Not on file    Years of education: Not on file    Highest education level: Not on file   Occupational History    Not on file   Tobacco Use    Smoking status: Former Smoker     Packs/day: 2.00     Years: 40.00     Pack years: 80.00     Types: Cigarettes     Quit date: 2012     Years since quittin.3    Smokeless tobacco: Never Used   Vaping Use    Vaping Use: Never used   Substance and Sexual Activity    Alcohol use: Yes     Alcohol/week: 16.7 standard drinks     Types: 20 Standard drinks or equivalent per week     Comment: occ    Drug use: Not Currently    Sexual activity: Not on file   Other Topics Concern    Not on file   Social History Narrative    Not on file     Social Determinants of Health     Financial Resource Strain: Low Risk     Difficulty of Paying Living Expenses: Not hard at all   Food Insecurity: No Food Insecurity    Worried About [address] in the Last Year: Never true    [address] in the Last Year: Never true   Transportation Needs: No Transportation Needs    Lack of Transportation (Medical): No    Lack of Transportation (Non-Medical): No   Physical Activity:     Days of Exercise per Week:     Minutes of Exercise per Session:    Stress:     Feeling of Stress :    Social Connections:     Frequency of Communication with Friends and Family:     Frequency of Social Gatherings with Friends and Family:     Attends Buddhism Services:     Active Member of Clubs or Organizations:     Attends Club or Organization Meetings:     Marital Status:    Intimate Partner Violence:     Fear of Current or Ex-Partner:     Emotionally Abused:     Physically Abused:     Sexually Abused:        Family History   Problem Relation Age of Onset    Ovarian Cancer Mother     Other Other         AAA    Hypertension Other     Rheum Arthritis Other        Current Outpatient Medications on File Prior to Visit   Medication Sig Dispense Refill    lisinopril-hydroCHLOROthiazide (PRINZIDE;ZESTORETIC) 20-25 MG per tablet TAKE 1 TABLET BY MOUTH EVERY DAY 90 tablet 1    omeprazole (PRILOSEC) 20 MG delayed release capsule TAKE 1 CAPSULE BY MOUTH EVERY DAY 90 capsule 2    augmented betamethasone dipropionate (DIPROLENE AF) 0.05 % cream Apply topically 2 times daily. (Patient not taking: Reported on 8/12/2021) 50 g 0     No current facility-administered medications on file prior to visit. Pertinent items are noted in HPI  Review of systems reviewed from Patient History Form dated on 8/12/2021 and available in the patient's chart under the Media tab. No change noted. PHYSICAL EXAMINATION:  Mr. Cornelius Jose is a very pleasant 59 y.o.  male who presents today in no acute distress, awake, alert, and oriented. He is well dressed, nourished and  groomed. Patient with normal affect. Height is  5' 9\" (1.753 m), weight is 294 lb (133.4 kg), Body mass index is 43.42 kg/m². Resting respiratory rate is 16. Examination of the gait, showed that the patient walks heel-toe with a minimal limp.  Examination of both ankles showing dorsiflexion to about 5 degrees bilaterally, which increased with knee flexion. He has intact sensation and good pedal pulses.  He has weak inversion, and has tenderness on deep palpation over the posterior tibial tendon with swelling compared to the other side.  The ankles are stable to drawer test bilaterally, equally.  He unable to perform single leg toe raise. IMAGING:Xray's were reviewed, 3 views of the left ankle taken in office today, and showed no acute fracture. Pes planus. IMPRESSION: Left posterior tibial tendenosis. PLAN: I discussed with the patient all the treatment options, and natural history of PTTD. We recommended stretching exercises of the calf which was taught to the patient today. He will take NSAIDS. I believe he will benefit from PTTD brace, and that was applied in the office today and instructed him in care. We discussed the risk of progression. We will see him  back in 6 weeks with foot xray, and may consider UCBL. Procedures    [employer] Airlift PTTD Brace     Patient was prescribed a [employer] Airlift PTTD Ankle Brace. The left ankle will require stabilization / immobilization from this semi-rigid / rigid orthosis to improve their function.   The orthosis will assist in protecting the affected area, provide functional support and facilitate healing. Patient was instructed to progress ambulation weight bearing as tolerated in the device. The patient was educated and fit by a healthcare professional with expert knowledge and specialization in brace application while under the direct supervision of the treating physician. Verbal and written instructions for the use of and application of this item were provided. They were instructed to contact the office immediately should the brace result in increased pain, decreased sensation, increased swelling or worsening of the condition. Thank you very much for the kind consultation and allowing me to participate in this patient's care. I will continue to keep you apprised of his progress.         Brit Melgoza MD

## 2021-09-16 ENCOUNTER — VIRTUAL VISIT (OUTPATIENT)
Dept: PULMONOLOGY | Age: 61
End: 2021-09-16
Payer: COMMERCIAL

## 2021-09-16 DIAGNOSIS — G47.10 HYPERSOMNIA: Primary | ICD-10-CM

## 2021-09-16 DIAGNOSIS — R06.81 WITNESSED APNEIC SPELLS: ICD-10-CM

## 2021-09-16 DIAGNOSIS — K21.9 GASTROESOPHAGEAL REFLUX DISEASE WITHOUT ESOPHAGITIS: Chronic | ICD-10-CM

## 2021-09-16 DIAGNOSIS — I10 ESSENTIAL HYPERTENSION, BENIGN: Chronic | ICD-10-CM

## 2021-09-16 DIAGNOSIS — E66.01 MORBID OBESITY (HCC): Chronic | ICD-10-CM

## 2021-09-16 PROBLEM — M19.072 PRIMARY OSTEOARTHRITIS OF LEFT ANKLE: Chronic | Status: ACTIVE | Noted: 2017-05-19

## 2021-09-16 PROBLEM — M17.0 PRIMARY OSTEOARTHRITIS OF BOTH KNEES: Chronic | Status: ACTIVE | Noted: 2017-05-19

## 2021-09-16 PROBLEM — M43.16 SPONDYLOLISTHESIS AT L4-L5 LEVEL: Chronic | Status: ACTIVE | Noted: 2020-02-20

## 2021-09-16 PROCEDURE — 99244 OFF/OP CNSLTJ NEW/EST MOD 40: CPT | Performed by: INTERNAL MEDICINE

## 2021-09-16 ASSESSMENT — ENCOUNTER SYMPTOMS
RHINORRHEA: 0
EYE PAIN: 0
APNEA: 1
CHEST TIGHTNESS: 0
VOMITING: 0
SHORTNESS OF BREATH: 0
NAUSEA: 0
ABDOMINAL DISTENTION: 0
PHOTOPHOBIA: 0
ABDOMINAL PAIN: 0
ALLERGIC/IMMUNOLOGIC NEGATIVE: 1
CHOKING: 0

## 2021-09-16 ASSESSMENT — SLEEP AND FATIGUE QUESTIONNAIRES

## 2021-09-16 NOTE — LETTER
[facility] Sleep Medicine  [place] [address] [facility] [address]  Phone: [phone]  Fax: [phone]           Cynthia Sanabria MD      September 16, 2021     Patient: Juan Francisco Vela   MR Number: <[id number]>   YOB: 1960   Date of Visit: 9/16/2021       Dear Dr. Claudene Sportsman: Thank you for referring Greg Huang to me for evaluation/treatment. Below are the relevant portions of my assessment and plan of care. Visit Diagnoses and Associated Orders     Hypersomnia   (New Problem)  -  Primary    needs work-up    Home Sleep Study (HST) [76569 Custom]   - Future Order         Witnessed apneic spells   (New Problem)      needs work-up    Home Sleep Study (HST) [60723 Custom]   - Future Order         Essential hypertension, benign   (Stable)           Gastroesophageal reflux disease without esophagitis   (Stable)           Morbid obesity (HCC)   (Not Stable)                 One or more undiagnosed new problem with uncertain prognosis till final diagnosis is made. Differential diagnosis includes but not limited to: HORACIO, PLMD's, narcolepsy, parasomnias. Reviewed HORACIO (highest likelihood Dx): pathophysiology, diagnosis, complications and treatment. Instructed him not to drive if drowsy. Continue medications per her PCP and other physicians. Limit caffeine use after 3pm. Standard of care is to do in-lab PSG but insurance is mandating an inferior HST. 1 wk follow up after study to review his results. The chronic medical conditions listed are directly related to the primary diagnosis listed above. The management of the primary diagnosis affects the secondary diagnosis and vice versa. This information was analyzed to assess complexity and medical decision making in regards to further testing and management. Continue meds for: HTN and GERD. Pt would medically benefit from wt loss for HORACIO (diet, exercise, surgical).     Orders Placed This Encounter   Procedures    Home Sleep Study (HST)       If you have questions, please do not hesitate to call me. I look forward to following Sofiya Hernandez along with you.     Sincerely,        Irvin Capellan MD    CC providers:  Nichole Escalera MD  [address] [address]  [place]

## 2021-09-16 NOTE — PROGRESS NOTES
Lukas Flaherty MD, Jessica Mckeon, [facility]  Tiffanie Kehrt CNP  Lilly Salthao CNP [address]  [place] [address] [facility], [address] [facility] [phone]   [facility] [address]  [place] [facility]. [address] [phone]     Video Visit- Consult    Pursuant to the emergency declaration under the [address] [place] [address] waShriners Hospitals for Children authority and the [employer] Act, this Virtual  Visit was conducted, with patient's consent, to reduce the patient's risk of exposure to COVID-19. Services were provided through a video synchronous discussion virtually to substitute for in-person clinic visit. Patient was located in their home. Assessment:      Visit Diagnoses and Associated Orders     Hypersomnia   (New Problem)  -  Primary    needs work-up    Home Sleep Study (HST) [[id number] Custom]   - Future Order         Witnessed apneic spells   (New Problem)      needs work-up    Home Sleep Study (HST) [[id number] Custom]   - Future Order         Essential hypertension, benign   (Stable)           Gastroesophageal reflux disease without esophagitis   (Stable)           Morbid obesity (HCC)   (Not Stable)                  Plan:      One or more undiagnosed new problem with uncertain prognosis till final diagnosis is made. Differential diagnosis includes but not limited to: HORACIO, PLMD's, narcolepsy, parasomnias. Reviewed HORACIO (highest likelihood Dx): pathophysiology, diagnosis, complications and treatment. Instructed him not to drive if drowsy. Continue medications per her PCP and other physicians. Limit caffeine use after 3pm. Standard of care is to do in-lab PSG but insurance is mandating an inferior HST. 1 wk follow up after study to review his results. The chronic medical conditions listed are directly related to the primary diagnosis listed above. The management of the primary diagnosis affects the secondary diagnosis and vice versa.     This information was analyzed to assess complexity and medical decision making in regards to further testing and management. Continue meds for: HTN and GERD. Pt would medically benefit from wt loss for HORACIO (diet, exercise, surgical). Orders Placed This Encounter   Procedures    Home Sleep Study (HST)          Subjective:     Patient ID: Titi Childers is a 59 y.o. male. Chief Complaint   Patient presents with    New Patient     ref by Dr Tono Avalos Snoring       HPI:      Titi Childers is a 59 y.o. male referred by Radha Mejia MD for a sleep evaluation. He complains of: snoring, witnessed apneas, excessive daytime sleepiness , non-restorative sleep, nocturia and tossing and turning at night. He denies: cataplexy and hypnagogic hallucinations. Symptoms began several years ago, gradually worsening since that time    Previous evaluation and treatment has included- None     Chronic stable medical conditions: HTN and GERD  Chronic unstable medical conditions: obesity    DOT/CDL - No  FAA/'s license -No    Previous Report(s) Reviewed: historical medical records, office notes, and referral letter(s). Pertinent data has been documented. [place] - Total score: 0    Caffeine Intake - Coffee: 2 cup(s) per day    Social History     Socioeconomic History    Marital status:      Spouse name: Not on file    Number of children: Not on file    Years of education: Not on file    Highest education level: Not on file   Occupational History    Not on file   Tobacco Use    Smoking status: Former Smoker     Packs/day: 2.00     Years: 40.00     Pack years: 80.00     Types: Cigarettes     Quit date: 2012     Years since quittin.4    Smokeless tobacco: Never Used   Vaping Use    Vaping Use: Never used   Substance and Sexual Activity    Alcohol use:  Yes     Alcohol/week: 16.7 standard drinks     Types: 20 Standard drinks or equivalent per week     Comment: occ    Drug use: Not Currently    Sexual activity: Not on file   Other Topics Concern    Not on file   Social History Narrative    Not on file     Social Determinants of Health     Financial Resource Strain: Low Risk     Difficulty of Paying Living Expenses: Not hard at all   Food Insecurity: No Food Insecurity    Worried About Running Out of Food in the Last Year: Never true    [address] in the Last Year: Never true   Transportation Needs: No Transportation Needs    Lack of Transportation (Medical): No    Lack of Transportation (Non-Medical):  No   Physical Activity:     Days of Exercise per Week:     Minutes of Exercise per Session:    Stress:     Feeling of Stress :    Social Connections:     Frequency of Communication with Friends and Family:     Frequency of Social Gatherings with Friends and Family:     Attends Jehovah's witness Services:     Active Member of Clubs or Organizations:     Attends Club or Organization Meetings:     Marital Status:    Intimate Partner Violence:     Fear of Current or Ex-Partner:     Emotionally Abused:     Physically Abused:     Sexually Abused:         Current Outpatient Medications   Medication Instructions    atorvastatin (LIPITOR) 20 mg, Oral, DAILY    lisinopril-hydroCHLOROthiazide (PRINZIDE;ZESTORETIC) 20-25 MG per tablet TAKE 1 TABLET BY MOUTH EVERY DAY    metFORMIN (GLUCOPHAGE) 500 mg, Oral, DAILY WITH BREAKFAST    omeprazole (PRILOSEC) 20 MG delayed release capsule TAKE 1 CAPSULE BY MOUTH EVERY DAY       Allergies as of 09/16/2021 - Fully Reviewed 09/16/2021   Allergen Reaction Noted    Penicillins Rash 12/16/2010       Patient Active Problem List   Diagnosis    Essential hypertension, benign    Cholesteatoma    Erectile dysfunction    Plantar fasciitis    DDD (degenerative disc disease), lumbar    Low testosterone    GERD (gastroesophageal reflux disease)    Morbid obesity ([address]    Primary osteoarthritis of both knees    Primary osteoarthritis of left ankle    Hypercholesterolemia    Spondylolisthesis at L4-L5 level    S/P lumbar fusion    Hyperglycemia    Posterior tibial tendon dysfunction (PTTD) of left lower extremity       Past Medical History:   Diagnosis Date    Cholesteatoma     DDD (degenerative disc disease), lumbar     Erectile dysfunction     Essential hypertension, benign     GERD (gastroesophageal reflux disease) 6/20/2014    Hypercholesterolemia     Hyperglycemia 8/13/2021    Low testosterone 1/27/2014    Morbid obesity ([address] 3/10/2016    Nontraumatic rupture of tendons of biceps (long head)     Plantar fasciitis     Primary osteoarthritis of both knees 5/19/2017    Primary osteoarthritis of left ankle 5/19/2017       Past Surgical History:   Procedure Laterality Date    BICEPS TENDON REPAIR Left     CATARACT REMOVAL      LUMBAR FUSION N/A 2/20/2020    L4, S1 ANTERIOR LUMBAR INTERBODY FUSION WITH PEDICLE SCREW FIXATION performed by Harika Bravo MD at [address] [place] [address] Right     TONSILLECTOMY AND ADENOIDECTOMY      VASECTOMY         Family History   Problem Relation Age of Onset    Ovarian Cancer Mother     Other Other         AAA    Hypertension Other     Rheum Arthritis Other        Review of Systems   Constitutional: Positive for fatigue and unexpected weight change. Negative for activity change and appetite change. HENT: Positive for congestion. Negative for nosebleeds, postnasal drip, rhinorrhea and sneezing. Eyes: Negative for photophobia, pain and visual disturbance. Respiratory: Positive for apnea. Negative for choking, chest tightness and shortness of breath. Cardiovascular: Negative. Gastrointestinal: Negative for abdominal distention, abdominal pain, nausea and vomiting. Endocrine: Negative for cold intolerance and heat intolerance. Genitourinary: Positive for frequency. Negative for difficulty urinating, dysuria and urgency. Musculoskeletal: Negative. Negative for neck pain and neck stiffness. Skin: Negative. Allergic/Immunologic: Negative. Neurological: Negative for tremors, seizures, syncope and weakness. Hematological: Negative for adenopathy. Does not bruise/bleed easily. Psychiatric/Behavioral: Positive for sleep disturbance. Negative for agitation, behavioral problems and confusion. Objective:     Vitals:  Patient reported Height and Weight Calculated BMI   Patient-Reported Vitals 9/16/2021   Patient-Reported Weight 293lb   Patient-Reported Height 5ft9in       43.3     Due to COVID-19 this was a virtual visit and physical exam was deferred.     Electronically signed by Nimisha Arreola MD on9/16/2021 at 9:54 AM

## 2021-09-21 ENCOUNTER — OFFICE VISIT (OUTPATIENT)
Dept: ORTHOPEDIC SURGERY | Age: 61
End: 2021-09-21
Payer: COMMERCIAL

## 2021-09-21 VITALS — WEIGHT: 293 LBS | RESPIRATION RATE: 16 BRPM | HEIGHT: 69 IN | BODY MASS INDEX: 43.4 KG/M2

## 2021-09-21 DIAGNOSIS — M79.672 LEFT FOOT PAIN: Primary | ICD-10-CM

## 2021-09-21 DIAGNOSIS — M76.829 PTTD (POSTERIOR TIBIAL TENDON DYSFUNCTION): ICD-10-CM

## 2021-09-21 DIAGNOSIS — M21.42 PES PLANUS OF BOTH FEET: ICD-10-CM

## 2021-09-21 DIAGNOSIS — M21.41 PES PLANUS OF BOTH FEET: ICD-10-CM

## 2021-09-21 PROCEDURE — 99213 OFFICE O/P EST LOW 20 MIN: CPT | Performed by: ORTHOPAEDIC SURGERY

## 2021-09-21 NOTE — PROGRESS NOTES
CHIEF COMPLAINT: Left posterior-medial ankle pain/ posterior tibial tendenosis. HISTORY:  Mr. Rani Mcdaniels 59 y.o.  male presents today for follow-up of left ankle pain. He was initially seen on 8/12/2021 as a consultation request from Nichole Escalera MD for evaluation of left posterior-medial ankle pain which started 10 years ago. At the last office visit, he was placed in a PT TD brace and reports good improvement. He states that now the same problem is starting with his right ankle.  He is complaining of mild, achy, intermittent pain. Rates his pain a 2/10 VAS. Pain is increase with standing and walking and shoe wear. Pain is sharp with first few steps, dull achy pain by the end of the day. The pain radiates to medial leg, and no numbness and tingling sensation. No other complaint.       Past Medical History:   Diagnosis Date    Cholesteatoma     DDD (degenerative disc disease), lumbar     Erectile dysfunction     Essential hypertension, benign     GERD (gastroesophageal reflux disease) 6/20/2014    Hypercholesterolemia     Hyperglycemia 8/13/2021    Low testosterone 1/27/2014    Morbid obesity ([facility] [address] 3/10/2016    Nontraumatic rupture of tendons of biceps (long head)     Plantar fasciitis     Primary osteoarthritis of both knees 5/19/2017    Primary osteoarthritis of left ankle 5/19/2017       Past Surgical History:   Procedure Laterality Date    BICEPS TENDON REPAIR Left     CATARACT REMOVAL      LUMBAR FUSION N/A 2/20/2020    L4, S1 ANTERIOR LUMBAR INTERBODY FUSION WITH PEDICLE SCREW FIXATION performed by Sreedhar Zapata MD at [address] [place] [address] Right     TONSILLECTOMY AND ADENOIDECTOMY      VASECTOMY         Social History     Socioeconomic History    Marital status:      Spouse name: Not on file    Number of children: Not on file    Years of education: Not on file    Highest education level: Not on file   Occupational History    Not on file   Tobacco Use    Smoking status: Former Smoker     Packs/day: 2.00     Years: 40.00     Pack years: 80.00     Types: Cigarettes     Quit date: 2012     Years since quittin.4    Smokeless tobacco: Never Used   Vaping Use    Vaping Use: Never used   Substance and Sexual Activity    Alcohol use: Yes     Alcohol/week: 16.7 standard drinks     Types: 20 Standard drinks or equivalent per week     Comment: occ    Drug use: Not Currently    Sexual activity: Not on file   Other Topics Concern    Not on file   Social History Narrative    Not on file     Social Determinants of Health     Financial Resource Strain: Low Risk     Difficulty of Paying Living Expenses: Not hard at all   Food Insecurity: No Food Insecurity    Worried About [address] in the Last Year: Never true    [address] in the Last Year: Never true   Transportation Needs: No Transportation Needs    Lack of Transportation (Medical): No    Lack of Transportation (Non-Medical):  No   Physical Activity:     Days of Exercise per Week:     Minutes of Exercise per Session:    Stress:     Feeling of Stress :    Social Connections:     Frequency of Communication with Friends and Family:     Frequency of Social Gatherings with Friends and Family:     Attends Jehovah's witness Services:     Active Member of Clubs or Organizations:     Attends Club or Organization Meetings:     Marital Status:    Intimate Partner Violence:     Fear of Current or Ex-Partner:     Emotionally Abused:     Physically Abused:     Sexually Abused:        Family History   Problem Relation Age of Onset    Ovarian Cancer Mother     Other Other         AAA    Hypertension Other     Rheum Arthritis Other        Current Outpatient Medications on File Prior to Visit   Medication Sig Dispense Refill    atorvastatin (LIPITOR) 20 MG tablet Take 1 tablet by mouth daily 30 tablet 5    metFORMIN (GLUCOPHAGE) 500 MG tablet Take 1 tablet by mouth daily (with breakfast) 30 tablet 5  lisinopril-hydroCHLOROthiazide (PRINZIDE;ZESTORETIC) 20-25 MG per tablet TAKE 1 TABLET BY MOUTH EVERY DAY 90 tablet 1    omeprazole (PRILOSEC) 20 MG delayed release capsule TAKE 1 CAPSULE BY MOUTH EVERY DAY 90 capsule 2     No current facility-administered medications on file prior to visit. Pertinent items are noted in HPI  Review of systems reviewed from Patient History Form dated on 8/12/2021 and available in the patient's chart under the Media tab. No change noted. PHYSICAL EXAMINATION:  Mr. Mattie Molina is a very pleasant 59 y.o.  male who presents today in no acute distress, awake, alert, and oriented. He is well dressed, nourished and  groomed. Patient with normal affect. Height is  5' 9\" (1.753 m), weight is 293 lb (132.9 kg), Body mass index is 43.27 kg/m². Resting respiratory rate is 16. Examination of the gait, showed that the patient walks heel-toe with a minimal limp.  Examination of both ankles showing dorsiflexion to about 5 degrees bilaterally, which increased with knee flexion. He has intact sensation and good pedal pulses.  He has weak inversion, and has tenderness on deep palpation over the posterior tibial tendon with swelling compared to the other side.  The ankles are stable to drawer test bilaterally, equally.  He unable to perform single leg toe raise. Bilateral pes planus. IMAGING: Xray's were reviewed,  3 views of the left foot taken in office today, and showed no acute fracture. Pes planus, with 40% of talar head uncovered. Amandeep Solo were reviewed, 3 views of the left ankle taken in office 8/12/2021 and 3 views of the left foot taken today, and showed no acute fracture. Pes planus. IMPRESSION:   1-Left posterior tibial tendenosis. 2-Bilateral pes planus, L>R    PLAN: I discussed with the patient all the treatment options, and natural history of PTTD. We recommended stretching exercises of the calf which was taught to the patient today.  He will take NSAIDS over-the-counter as needed. I believe he will benefit from UCBL brace, Rx given and instructed him in care. We discussed the risk of progression. We will see him  back in 2 months after using the brace to see how he is progressing.       Brit Melgoza MD

## 2021-10-07 ENCOUNTER — HOSPITAL ENCOUNTER (OUTPATIENT)
Dept: SLEEP CENTER | Age: 61
Discharge: HOME | End: 2021-10-07
Payer: COMMERCIAL

## 2021-10-07 DIAGNOSIS — G47.10 HYPERSOMNIA: ICD-10-CM

## 2021-10-07 DIAGNOSIS — R06.81 WITNESSED APNEIC SPELLS: ICD-10-CM

## 2021-10-07 PROCEDURE — 95806 SLEEP STUDY UNATT&RESP EFFT: CPT

## 2021-10-10 PROCEDURE — 95806 SLEEP STUDY UNATT&RESP EFFT: CPT | Performed by: INTERNAL MEDICINE

## 2021-10-12 ENCOUNTER — TELEPHONE (OUTPATIENT)
Dept: PULMONOLOGY | Age: 61
End: 2021-10-12

## 2021-10-12 NOTE — PROGRESS NOTES
Crystal Woods         : 1960  [address] [facility]    Diagnosis: [x] HORACIO (G47.33) [] CSA (G47.31) [] Apnea (G47.30)   Length of Need: [x] 12 Months [] 99 Months [] Other:    Machine (SANTANA!): [] Respironics Dream Station   2   Auto [x] ResMed AirSense     Auto S11 [] Other:     [x]  CPAP () [] Bilevel ()   Mode: [x] Auto [] Spontaneous    Mode: [] Auto [] Spontaneous      P min 6 cmH2O  P max 16 cmH2O      Comfort Settings:   - Ramp Pressure: 4 cmH2O                                        - Ramp time: 15 min                                     -  Flex/EPR - 3 full time                                    - For ResMed Bilevel (TiMax-4 sec   TiMin- 0.2 sec)     Humidifier: [x] Heated ()        [x] Water chamber replacement ()/ 1 per 6 months        Mask:   [x] Nasal () /1 per 3 months [] Full Face () /1 per 3 months   [x] Patient choice -Size and fit mask [] Patient Choice - Size and fit mask   [] Dispense:  [] Dispense:    [x] Headgear () / 1 per 3 months [] Headgear () / 1 per 3 months   [x] Replacement Nasal Cushion ()/2 per month [] Interface Replacement ()/1 per month   [] Replacement Nasal Pillows ()/2 per month         Tubing: [x] Heated ()/1 per 3 months    [] Standard ()/1 per 3 months [] Other:           Filters: [x] Non-disposable ()/1 per 6 months     [x] Ultra-Fine, Disposable ()/2 per month        Miscellaneous: [] Chin Strap ()/ 1 per 6 months [] O2 bleed-in:       LPM   [] Oximetry on CPAP/Bilevel []  Other:    [x] Modem: ()         Start Order Date: 10/12/21    MEDICAL JUSTIFICATION:  I, the undersigned, certify that the above prescribed supplies are medically necessary for this patients wellbeing. In my opinion, the supplies are both reasonable and necessary in reference to accepted standards of medicalpractice in treatment of this patients condition.     Mery Denise MD      NPI: [id number]       Order Signed Date: 10/12/21    Electronically signed by Vidal Carrington MD on 10/12/2021 at 12:47 PM    August Dudley  1960  [address] [place] [address]  [phone] (home)   [phone] (mobile)      Insurance Info (confirm with patient if correct):  Payor/Plan Subscr  Sex Relation Sub.  Ins. ID Effective Group Num

## 2021-10-18 RX ORDER — LISINOPRIL AND HYDROCHLOROTHIAZIDE 25; 20 MG/1; MG/1
TABLET ORAL
Qty: 90 TABLET | Refills: 1 | Status: SHIPPED | OUTPATIENT
Start: 2021-10-18 | End: 2022-05-05

## 2021-10-18 NOTE — TELEPHONE ENCOUNTER
Requested Prescriptions     Pending Prescriptions Disp Refills    lisinopril-hydroCHLOROthiazide (PRINZIDE;ZESTORETIC) 20-25 MG per tablet [Pharmacy Med Name: LISINOPRIL-HCTZ 20-25 MG TAB] 90 tablet 1     Sig: TAKE 1 TABLET BY MOUTH EVERY DAY     Last ov 8/13/2021  Last labs 7/20/21

## 2021-10-20 ENCOUNTER — TELEPHONE (OUTPATIENT)
Dept: PULMONOLOGY | Age: 61
End: 2021-10-20

## 2021-10-21 NOTE — TELEPHONE ENCOUNTER
Called patient and he stated he did not want to deal with [facility]. I told him he could research the DME companies and speak with his insurance and let us know who he would like the order sent to. Patient asked for a copy of the order to be emailed to [email or web address] and it was done. Patient will call back if additional information needs to be sent to a DME company. Patient was advised that if he went through a storefront like [employer] or [email or web address]. [email or web address], they would not service his machine if it were to break.

## 2021-11-09 DIAGNOSIS — E78.00 HYPERCHOLESTEROLEMIA: ICD-10-CM

## 2021-11-09 DIAGNOSIS — R73.9 HYPERGLYCEMIA: ICD-10-CM

## 2021-11-10 LAB
A/G RATIO: 2.4 (ref 1.1–2.2)
ALBUMIN SERPL-MCNC: 4.5 G/DL (ref 3.4–5)
ALP BLD-CCNC: 57 U/L (ref 40–129)
ALT SERPL-CCNC: 28 U/L (ref 10–40)
ANION GAP SERPL CALCULATED.3IONS-SCNC: 18 MMOL/L (ref 3–16)
AST SERPL-CCNC: 21 U/L (ref 15–37)
BILIRUB SERPL-MCNC: 0.5 MG/DL (ref 0–1)
BUN BLDV-MCNC: 16 MG/DL (ref 7–20)
CALCIUM SERPL-MCNC: 9.2 MG/DL (ref 8.3–10.6)
CHLORIDE BLD-SCNC: 100 MMOL/L (ref 99–110)
CHOLESTEROL, TOTAL: 204 MG/DL (ref 0–199)
CO2: 20 MMOL/L (ref 21–32)
CREAT SERPL-MCNC: 0.8 MG/DL (ref 0.8–1.3)
ESTIMATED AVERAGE GLUCOSE: 122.6 MG/DL
GFR AFRICAN AMERICAN: >60
GFR NON-AFRICAN AMERICAN: >60
GLUCOSE BLD-MCNC: 85 MG/DL (ref 70–99)
HBA1C MFR BLD: 5.9 %
HDLC SERPL-MCNC: 44 MG/DL (ref 40–60)
LDL CHOLESTEROL CALCULATED: 133 MG/DL
POTASSIUM SERPL-SCNC: 4.5 MMOL/L (ref 3.5–5.1)
SODIUM BLD-SCNC: 138 MMOL/L (ref 136–145)
TOTAL PROTEIN: 6.4 G/DL (ref 6.4–8.2)
TRIGL SERPL-MCNC: 137 MG/DL (ref 0–150)
VLDLC SERPL CALC-MCNC: 27 MG/DL

## 2021-11-15 ENCOUNTER — OFFICE VISIT (OUTPATIENT)
Dept: FAMILY MEDICINE CLINIC | Age: 61
End: 2021-11-15
Payer: COMMERCIAL

## 2021-11-15 VITALS
TEMPERATURE: 97.8 F | WEIGHT: 289.4 LBS | OXYGEN SATURATION: 97 % | HEART RATE: 65 BPM | DIASTOLIC BLOOD PRESSURE: 70 MMHG | RESPIRATION RATE: 12 BRPM | BODY MASS INDEX: 42.74 KG/M2 | SYSTOLIC BLOOD PRESSURE: 134 MMHG

## 2021-11-15 DIAGNOSIS — I10 ESSENTIAL HYPERTENSION, BENIGN: Primary | Chronic | ICD-10-CM

## 2021-11-15 DIAGNOSIS — E66.01 MORBID OBESITY (HCC): ICD-10-CM

## 2021-11-15 DIAGNOSIS — E78.00 HYPERCHOLESTEROLEMIA: Chronic | ICD-10-CM

## 2021-11-15 DIAGNOSIS — R73.9 HYPERGLYCEMIA: ICD-10-CM

## 2021-11-15 DIAGNOSIS — Z12.11 SCREEN FOR COLON CANCER: ICD-10-CM

## 2021-11-15 PROCEDURE — 99214 OFFICE O/P EST MOD 30 MIN: CPT | Performed by: FAMILY MEDICINE

## 2021-11-15 NOTE — PROGRESS NOTES
Here for f/u and recheck of blood pressure, and blood sugars    Pt states with lifestyle changes, has been able to lose about 15#. Pt states that he has cut down on portions, and has been more active and has cut down on EtOH that has been helpful. Pt does have moderate hiatal hernia and that is controlled with prn omeprazole and lifestyle changes. Does have sensitivities to diet and has to eat early. Pt here for follow up of blood pressure. Pt states doing great with adherence to therapy and feels well. No issues of chest pain, shortness of breath. No vision changes, headache, swelling in legs    Here for f/u of cholesterol. Pt as been working on diet/exercise and is consistent with therapy. No side effects from current therapy. No chest pain, shortness of breath. No numbness/tingling in extremities   Pt states that with statin, felt that he would have dizziness, worse with lifting hands over head. Pt stopped and sx did improve      Except as noted above in the history of present illness, the review of systems is  negative for headache, vision changes, chest pain, shortness of breath, abdominal pain, urinary sx, bowel changes. Past medical, surgical, and social history reviewed and updated  Medications and allergies reviewed and updated      O: /70   Pulse 65   Temp 97.8 °F (36.6 °C) (Temporal)   Resp 12   Wt 289 lb 6.4 oz (131.3 kg)   SpO2 97%   BMI 42.74 kg/m²   GEN: No acute distress, cooperative, well nourished, alert. HEENT: PEERLA, EOMI , normocephalic/atraumatic, nares and oropharynx clear. Mucous membranes normal, Tympanic membranes clear bilaterally. Neck: soft, supple, no thyromegaly, mass, no Lymphadenopathy  CV: Regular rate and rhythm, no murmur, rubs, gallops. No edema. Resp: Clear to auscultation bilaterally good air entry bilaterally  No crackles, wheeze. Breathing comfortably.    Psych: mood stable, No suicidal thoughts or ideation       Orders Only on 11/09/2021 Component Date Value Ref Range Status    Cholesterol, Total 11/09/2021 204* 0 - 199 mg/dL Final    Triglycerides 11/09/2021 137  0 - 150 mg/dL Final    HDL 11/09/2021 44  40 - 60 mg/dL Final    LDL Calculated 11/09/2021 133* <100 mg/dL Final    VLDL Cholesterol Calculated 11/09/2021 27  Not Established mg/dL Final    Sodium 11/09/2021 138  136 - 145 mmol/L Final    Potassium 11/09/2021 4.5  3.5 - 5.1 mmol/L Final    Chloride 11/09/2021 100  99 - 110 mmol/L Final    CO2 11/09/2021 20* 21 - 32 mmol/L Final    Anion Gap 11/09/2021 18* 3 - 16 Final    Glucose 11/09/2021 85  70 - 99 mg/dL Final    BUN 11/09/2021 16  7 - 20 mg/dL Final    CREATININE 11/09/2021 0.8  0.8 - 1.3 mg/dL Final    GFR Non- 11/09/2021 >60  >60 Final    Comment: >60 mL/min/1.73m2 EGFR, calc. for ages 25 and older using the  MDRD formula (not corrected for weight), is valid for stable  renal function.  GFR  11/09/2021 >60  >60 Final    Comment: Chronic Kidney Disease: less than 60 ml/min/1.73 sq.m. Kidney Failure: less than 15 ml/min/1.73 sq.m. Results valid for patients 18 years and older.  Calcium 11/09/2021 9.2  8.3 - 10.6 mg/dL Final    Total Protein 11/09/2021 6.4  6.4 - 8.2 g/dL Final    Albumin 11/09/2021 4.5  3.4 - 5.0 g/dL Final    Albumin/Globulin Ratio 11/09/2021 2.4* 1.1 - 2.2 Final    Total Bilirubin 11/09/2021 0.5  0.0 - 1.0 mg/dL Final    Alkaline Phosphatase 11/09/2021 57  40 - 129 U/L Final    ALT 11/09/2021 28  10 - 40 U/L Final    AST 11/09/2021 21  15 - 37 U/L Final    Hemoglobin A1C 11/09/2021 5.9  See comment % Final    Comment: Comment:  Diagnosis of Diabetes: > or = 6.5%  Increased risk of diabetes (Prediabetes): 5.7-6.4%  Glycemic Control: Nonpregnant Adults: <7.0%                    Pregnant: <6.0%        eAG 11/09/2021 122.6  mg/dL Final           ASSESSMENT / PLAN:    1. Essential hypertension, benign  stable @ goal    2. Hypercholesterolemia  Did not tolerate lipitor d/t myalgias  bloodwork improved  CT chest neg for coronary calcifications  Check f/u bloodwork 6 months    - Comprehensive Metabolic Panel; Future  - Lipid Panel; Future    3. Hyperglycemia  Improved but still high  Discussed tx options. Cont metformin, lifestyle mgt and recheck bloodwork 6 months  - Hemoglobin A1C; Future    4. Morbid obesity ([facility] [address]  Improving with lifestyle mgt    5. Screen for colon cancer  Due colonoscopy  Working to get set up           Follow-up appointment:   6 mos/prn    Discussed use, benefit, and side effects of all prescribed medications. Barriers to medication compliance addressed. All patient questions answered. Pt voiced understanding. When applicable, patient's outside records were reviewed through [facility]. The patient has signed appropriate paperworks/consents.

## 2021-12-15 ENCOUNTER — TELEPHONE (OUTPATIENT)
Dept: ORTHOPEDIC SURGERY | Age: 61
End: 2021-12-15

## 2022-03-21 RX ORDER — OMEPRAZOLE 20 MG/1
CAPSULE, DELAYED RELEASE ORAL
Qty: 90 CAPSULE | Refills: 2 | Status: SHIPPED | OUTPATIENT
Start: 2022-03-21 | End: 2022-04-25 | Stop reason: SDUPTHER

## 2022-04-25 RX ORDER — OMEPRAZOLE 20 MG/1
CAPSULE, DELAYED RELEASE ORAL
Qty: 30 CAPSULE | Refills: 2 | Status: SHIPPED | OUTPATIENT
Start: 2022-04-25

## 2022-04-25 NOTE — TELEPHONE ENCOUNTER
Requested Prescriptions     Pending Prescriptions Disp Refills    omeprazole (PRILOSEC) 20 MG delayed release capsule 30 capsule 2     Sig: TAKE 1 CAPSULE BY MOUTH EVERY DAY     Needs refill due to pharmacy change.     Last ov 11/15/2021   Last labs 11/9/21  Next f/u 5/17/22

## 2022-05-05 RX ORDER — LISINOPRIL AND HYDROCHLOROTHIAZIDE 25; 20 MG/1; MG/1
TABLET ORAL
Qty: 90 TABLET | Refills: 1 | Status: SHIPPED | OUTPATIENT
Start: 2022-05-05 | End: 2022-05-18 | Stop reason: SDUPTHER

## 2022-05-11 DIAGNOSIS — R73.9 HYPERGLYCEMIA: ICD-10-CM

## 2022-05-11 DIAGNOSIS — E78.00 HYPERCHOLESTEROLEMIA: Chronic | ICD-10-CM

## 2022-05-11 LAB
A/G RATIO: 2.2 (ref 1.1–2.2)
ALBUMIN SERPL-MCNC: 4.3 G/DL (ref 3.4–5)
ALP BLD-CCNC: 52 U/L (ref 40–129)
ALT SERPL-CCNC: 27 U/L (ref 10–40)
ANION GAP SERPL CALCULATED.3IONS-SCNC: 15 MMOL/L (ref 3–16)
AST SERPL-CCNC: 22 U/L (ref 15–37)
BILIRUB SERPL-MCNC: 0.4 MG/DL (ref 0–1)
BUN BLDV-MCNC: 12 MG/DL (ref 7–20)
CALCIUM SERPL-MCNC: 9.3 MG/DL (ref 8.3–10.6)
CHLORIDE BLD-SCNC: 104 MMOL/L (ref 99–110)
CHOLESTEROL, TOTAL: 202 MG/DL (ref 0–199)
CO2: 22 MMOL/L (ref 21–32)
CREAT SERPL-MCNC: 0.9 MG/DL (ref 0.8–1.3)
GFR AFRICAN AMERICAN: >60
GFR NON-AFRICAN AMERICAN: >60
GLUCOSE BLD-MCNC: 89 MG/DL (ref 70–99)
HDLC SERPL-MCNC: 47 MG/DL (ref 40–60)
LDL CHOLESTEROL CALCULATED: 136 MG/DL
POTASSIUM SERPL-SCNC: 4.2 MMOL/L (ref 3.5–5.1)
SODIUM BLD-SCNC: 141 MMOL/L (ref 136–145)
TOTAL PROTEIN: 6.3 G/DL (ref 6.4–8.2)
TRIGL SERPL-MCNC: 96 MG/DL (ref 0–150)
VLDLC SERPL CALC-MCNC: 19 MG/DL

## 2022-05-12 LAB
ESTIMATED AVERAGE GLUCOSE: 125.5 MG/DL
HBA1C MFR BLD: 6 %

## 2022-05-18 RX ORDER — LISINOPRIL AND HYDROCHLOROTHIAZIDE 25; 20 MG/1; MG/1
TABLET ORAL
Qty: 90 TABLET | Refills: 1 | Status: SHIPPED | OUTPATIENT
Start: 2022-05-18 | End: 2022-08-18 | Stop reason: SDUPTHER

## 2022-05-18 NOTE — TELEPHONE ENCOUNTER
----- Message from Willa Duarte sent at 5/18/2022 10:27 AM EDT -----  Subject: Refill Request    QUESTIONS  Name of Medication? lisinopril-hydroCHLOROthiazide (PRINZIDE;ZESTORETIC)   20-25 MG per tablet  Patient-reported dosage and instructions? once a day  How many days do you have left? 2  Preferred Pharmacy? [facility]  Pharmacy phone number (if available)? [phone]  ---------------------------------------------------------------------------  --------------  Lara Carrier INFO  What is the best way for the office to contact you? OK to leave message on   voicemail  Preferred Call Back Phone Number? [phone]  ---------------------------------------------------------------------------  --------------  SCRIPT ANSWERS  Relationship to Patient?  Self

## 2022-05-18 NOTE — TELEPHONE ENCOUNTER
Requested Prescriptions     Pending Prescriptions Disp Refills    lisinopril-hydroCHLOROthiazide (PRINZIDE;ZESTORETIC) 20-25 MG per tablet 90 tablet 1     Sig: TAKE 1 TABLET BY MOUTH EVERY DAY     rx was sent to the wrong pharmacy.  Pharmacy has been changed thank you

## 2022-06-09 ENCOUNTER — OFFICE VISIT (OUTPATIENT)
Dept: FAMILY MEDICINE CLINIC | Age: 62
End: 2022-06-09
Payer: COMMERCIAL

## 2022-06-09 VITALS
HEART RATE: 73 BPM | RESPIRATION RATE: 16 BRPM | DIASTOLIC BLOOD PRESSURE: 66 MMHG | OXYGEN SATURATION: 99 % | TEMPERATURE: 98.3 F | BODY MASS INDEX: 42.44 KG/M2 | WEIGHT: 287.4 LBS | SYSTOLIC BLOOD PRESSURE: 132 MMHG

## 2022-06-09 DIAGNOSIS — E78.00 HYPERCHOLESTEROLEMIA: Chronic | ICD-10-CM

## 2022-06-09 DIAGNOSIS — R73.9 HYPERGLYCEMIA: ICD-10-CM

## 2022-06-09 DIAGNOSIS — R22.31 MASS OF RIGHT ELBOW: ICD-10-CM

## 2022-06-09 DIAGNOSIS — I10 ESSENTIAL HYPERTENSION, BENIGN: Primary | Chronic | ICD-10-CM

## 2022-06-09 DIAGNOSIS — Z12.11 SCREEN FOR COLON CANCER: ICD-10-CM

## 2022-06-09 DIAGNOSIS — E66.01 MORBID OBESITY (HCC): ICD-10-CM

## 2022-06-09 DIAGNOSIS — E79.0 HYPERURICEMIA: ICD-10-CM

## 2022-06-09 DIAGNOSIS — Z12.5 SCREENING FOR PROSTATE CANCER: ICD-10-CM

## 2022-06-09 PROCEDURE — 99214 OFFICE O/P EST MOD 30 MIN: CPT | Performed by: FAMILY MEDICINE

## 2022-06-09 ASSESSMENT — PATIENT HEALTH QUESTIONNAIRE - PHQ9
9. THOUGHTS THAT YOU WOULD BE BETTER OFF DEAD, OR OF HURTING YOURSELF: 0
7. TROUBLE CONCENTRATING ON THINGS, SUCH AS READING THE NEWSPAPER OR WATCHING TELEVISION: 0
10. IF YOU CHECKED OFF ANY PROBLEMS, HOW DIFFICULT HAVE THESE PROBLEMS MADE IT FOR YOU TO DO YOUR WORK, TAKE CARE OF THINGS AT HOME, OR GET ALONG WITH OTHER PEOPLE: 0
SUM OF ALL RESPONSES TO PHQ QUESTIONS 1-9: 0
8. MOVING OR SPEAKING SO SLOWLY THAT OTHER PEOPLE COULD HAVE NOTICED. OR THE OPPOSITE, BEING SO FIGETY OR RESTLESS THAT YOU HAVE BEEN MOVING AROUND A LOT MORE THAN USUAL: 0
SUM OF ALL RESPONSES TO PHQ QUESTIONS 1-9: 0
2. FEELING DOWN, DEPRESSED OR HOPELESS: 0
SUM OF ALL RESPONSES TO PHQ QUESTIONS 1-9: 0
SUM OF ALL RESPONSES TO PHQ QUESTIONS 1-9: 0
6. FEELING BAD ABOUT YOURSELF - OR THAT YOU ARE A FAILURE OR HAVE LET YOURSELF OR YOUR FAMILY DOWN: 0
3. TROUBLE FALLING OR STAYING ASLEEP: 0
1. LITTLE INTEREST OR PLEASURE IN DOING THINGS: 0
SUM OF ALL RESPONSES TO PHQ9 QUESTIONS 1 & 2: 0
4. FEELING TIRED OR HAVING LITTLE ENERGY: 0
5. POOR APPETITE OR OVEREATING: 0

## 2022-06-09 NOTE — PROGRESS NOTES
Here for f/u and recheck of blood pressure, stress    Pt states that he has been dealing with moderate stressors. pts mother in law passed a way a few months ago and his son in law was in the hospital for several months after having COVID and lung transplant at [facility]. Pt feels that he is managing all the stress ok, feels that he is rolling ok with things. Pt here for follow up of blood pressure. Pt states doing great with adherence to therapy and feels well. No issues of chest pain, shortness of breath. No vision changes, headache, swelling in legs. blood sugars are stable, with a1c at 6.0. Pt was on statin in the past, but did not tolerate as he got dizzy. The 10-year ASCVD risk score (Deedee Trotter, et al., 2013) is: 15.1%    Values used to calculate the score:      Age: 57 years      Sex: Male      Is Non- : No      Diabetic: No      Tobacco smoker: No      Systolic Blood Pressure: 978 mmHg      Is BP treated: Yes      HDL Cholesterol: 47 mg/dL      Total Cholesterol: 202 mg/dL       O: /66   Pulse 73   Temp 98.3 °F (36.8 °C) (Temporal)   Resp 16   Wt 287 lb 6.4 oz (130.4 kg)   SpO2 99%   BMI 42.44 kg/m²   GEN: No acute distress, cooperative, well nourished, alert. HEENT: PEERLA, EOMI , normocephalic/atraumatic, nares and oropharynx clear. Mucous membranes normal, Tympanic membranes clear bilaterally. Neck: soft, supple, no thyromegaly, mass, no Lymphadenopathy  CV: Regular rate and rhythm, no murmur, rubs, gallops. No edema. Resp: Clear to auscultation bilaterally good air entry bilaterally  No crackles, wheeze. Breathing comfortably. Psych: mood stable, No suicidal thoughts or ideation   Musc: R elbow with soft tissue mass, proximal to medial maleolus, nontender.   Soft, rubbery      Orders Only on 05/11/2022   Component Date Value Ref Range Status    Cholesterol, Total 05/11/2022 202* 0 - 199 mg/dL Final    Triglycerides 05/11/2022 96  0 - 150 mg/dL Final    HDL 05/11/2022 47  40 - 60 mg/dL Final    LDL Calculated 05/11/2022 136* <100 mg/dL Final    VLDL Cholesterol Calculated 05/11/2022 19  Not Established mg/dL Final    Sodium 05/11/2022 141  136 - 145 mmol/L Final    Potassium 05/11/2022 4.2  3.5 - 5.1 mmol/L Final    Chloride 05/11/2022 104  99 - 110 mmol/L Final    CO2 05/11/2022 22  21 - 32 mmol/L Final    Anion Gap 05/11/2022 15  3 - 16 Final    Glucose 05/11/2022 89  70 - 99 mg/dL Final    BUN 05/11/2022 12  7 - 20 mg/dL Final    CREATININE 05/11/2022 0.9  0.8 - 1.3 mg/dL Final    GFR Non- 05/11/2022 >60  >60 Final    Comment: >60 mL/min/1.73m2 EGFR, calc. for ages 25 and older using the  MDRD formula (not corrected for weight), is valid for stable  renal function.  GFR  05/11/2022 >60  >60 Final    Comment: Chronic Kidney Disease: less than 60 ml/min/1.73 sq.m. Kidney Failure: less than 15 ml/min/1.73 sq.m. Results valid for patients 18 years and older.  Calcium 05/11/2022 9.3  8.3 - 10.6 mg/dL Final    Total Protein 05/11/2022 6.3* 6.4 - 8.2 g/dL Final    Albumin 05/11/2022 4.3  3.4 - 5.0 g/dL Final    Albumin/Globulin Ratio 05/11/2022 2.2  1.1 - 2.2 Final    Total Bilirubin 05/11/2022 0.4  0.0 - 1.0 mg/dL Final    Alkaline Phosphatase 05/11/2022 52  40 - 129 U/L Final    ALT 05/11/2022 27  10 - 40 U/L Final    AST 05/11/2022 22  15 - 37 U/L Final    Hemoglobin A1C 05/11/2022 6.0  See comment % Final    Comment: Comment:  Diagnosis of Diabetes: > or = 6.5%  Increased risk of diabetes (Prediabetes): 5.7-6.4%  Glycemic Control: Nonpregnant Adults: <7.0%                    Pregnant: <6.0%        eAG 05/11/2022 125.5  mg/dL Final          ASSESSMENT / PLAN:    1. Essential hypertension, benign  Stable @ goal, controlled  Cont to monitor  - Comprehensive Metabolic Panel; Future  - TSH; Future  - CBC; Future    2.  Hyperglycemia  Cont lifestyle mgt, diet/exercise  Check f/u bloodwork in 3 months  Continue metformin, work on weight loss  - Hemoglobin A1C; Future    3. Hypercholesterolemia  Did try lipitor but got dizzy  High ASCVD risk at 15%   Deferring trial of another statin therapy at this time  - Lipid Panel; Future  - TSH; Future    4. Screen for colon cancer  Due f/u colonoscopy  States he gets flare of gout with prep  Offered allopurinol to lower uric acid  Will consider    5. Morbid obesity ([address]  Monitor with diet/exercise    6. Hyperuricemia  Asymptomatic  Consider allopurinol, pt deferring    7. Mass of right elbow  Suspect lipoma  Refer for ultrasound   - US SOFT TISSUE LIMITED AREA; Future    8. Screening for prostate cancer  - PSA, Prostatic Specific Antigen; Future           Follow-up appointment:   Pending ultrasound  3 months  Prn     Discussed use, benefit, and side effects of all prescribed medications. Barriers to medication compliance addressed. All patient questions answered. Pt voiced understanding. When applicable, patient's outside records were reviewed through [facility]. The patient has signed appropriate paperworks/consents.

## 2022-06-13 ENCOUNTER — HOSPITAL ENCOUNTER (OUTPATIENT)
Dept: ULTRASOUND IMAGING | Age: 62
Discharge: HOME | End: 2022-06-13
Payer: COMMERCIAL

## 2022-06-13 DIAGNOSIS — R22.31 MASS OF RIGHT ELBOW: ICD-10-CM

## 2022-06-13 PROCEDURE — 76999 ECHO EXAMINATION PROCEDURE: CPT

## 2022-06-17 DIAGNOSIS — R22.31 ELBOW MASS, RIGHT: Primary | ICD-10-CM

## 2022-06-27 ENCOUNTER — TELEPHONE (OUTPATIENT)
Dept: FAMILY MEDICINE CLINIC | Age: 62
End: 2022-06-27

## 2022-06-27 NOTE — TELEPHONE ENCOUNTER
Jose F Andrews from [employer] said a precert is needed for th MRI order for the elbow .  Patient has appt today at 2pm. Any questioncall her at [phone]

## 2022-06-27 NOTE — TELEPHONE ENCOUNTER
Called [employer] to initiate Prior Authorization for pt MRI of elbow     [employer]: [phone]  CPT: [id number]  Carlos Arguelles TAX [id number]     MRI Approved [id number]  Dates: 6/27/22-7/26/22      General Welch advised at [facility] and given authorization.

## 2022-07-07 ENCOUNTER — TELEPHONE (OUTPATIENT)
Dept: FAMILY MEDICINE CLINIC | Age: 62
End: 2022-07-07

## 2022-07-07 NOTE — TELEPHONE ENCOUNTER
Prior Authorization for MRI/CAT Scan has been APPROVED by [facility] for time period 06/27/22 through 07/26/22. Please see attached scanned approval letter for more information.

## 2022-07-25 ENCOUNTER — TELEPHONE (OUTPATIENT)
Dept: FAMILY MEDICINE CLINIC | Age: 62
End: 2022-07-25

## 2022-07-25 NOTE — TELEPHONE ENCOUNTER
PT tested positive yesterday - symptoms began yesterday morning. Symptoms:    - nasal congestion  -chest congestion  - cough  - sob  - muscle aches  - headaches  - sore throat  - diarrhea  - chills  - runny nose  - sneezing    Wanting to know if he can get medication to get over this faster.      Thank you

## 2022-08-18 RX ORDER — LISINOPRIL AND HYDROCHLOROTHIAZIDE 25; 20 MG/1; MG/1
TABLET ORAL
Qty: 90 TABLET | Refills: 1 | Status: SHIPPED | OUTPATIENT
Start: 2022-08-18

## 2022-08-18 NOTE — TELEPHONE ENCOUNTER
Last office visit 6/9/2022     Last written 5/18/2022    Last Labs  06/09/2022    Next office visit scheduled 12/6/2022    Requested Prescriptions     Pending Prescriptions Disp Refills    lisinopril-hydroCHLOROthiazide (PRINZIDE;ZESTORETIC) 20-25 MG per tablet 90 tablet 1     Sig: TAKE 1 TABLET BY MOUTH EVERY DAY         Allan

## 2022-09-21 ENCOUNTER — OFFICE VISIT (OUTPATIENT)
Dept: FAMILY MEDICINE CLINIC | Age: 62
End: 2022-09-21
Payer: COMMERCIAL

## 2022-09-21 VITALS
TEMPERATURE: 96.9 F | DIASTOLIC BLOOD PRESSURE: 70 MMHG | SYSTOLIC BLOOD PRESSURE: 130 MMHG | HEART RATE: 75 BPM | OXYGEN SATURATION: 96 % | BODY MASS INDEX: 42.09 KG/M2 | RESPIRATION RATE: 17 BRPM | WEIGHT: 285 LBS

## 2022-09-21 DIAGNOSIS — R35.0 URINARY FREQUENCY: Primary | ICD-10-CM

## 2022-09-21 DIAGNOSIS — R31.9 HEMATURIA, UNSPECIFIED TYPE: ICD-10-CM

## 2022-09-21 LAB
BILIRUBIN, POC: NORMAL
BLOOD URINE, POC: NORMAL
CLARITY, POC: NORMAL
COLOR, POC: NORMAL
GLUCOSE URINE, POC: NORMAL
KETONES, POC: NORMAL
LEUKOCYTE EST, POC: NORMAL
NITRITE, POC: POSITIVE
PH, POC: 6
PROTEIN, POC: 100
SPECIFIC GRAVITY, POC: >=1.03
UROBILINOGEN, POC: 0.2

## 2022-09-21 PROCEDURE — 81002 URINALYSIS NONAUTO W/O SCOPE: CPT | Performed by: FAMILY MEDICINE

## 2022-09-21 PROCEDURE — 99214 OFFICE O/P EST MOD 30 MIN: CPT | Performed by: FAMILY MEDICINE

## 2022-09-21 RX ORDER — LEVOFLOXACIN 500 MG/1
500 TABLET, FILM COATED ORAL DAILY
Qty: 7 TABLET | Refills: 0 | Status: SHIPPED | OUTPATIENT
Start: 2022-09-21 | End: 2022-09-28

## 2022-09-21 NOTE — PROGRESS NOTES
Patient is here for urinary symptoms which started on Monday night - developed diarrhea, fever, burning with urination and blood. No abdominal or back pain . No h/o kidney stones. No groin or testicular pain . He had a fever for 1.5 days. No fever reducer taken this am.   No prior UTI. 40 oz of coffee/ day;  4- 12 beer / day . Urinary frequency . Mild urinary incontinence. Recently trying to increase water . Review of Systems    ROS: All other systems were reviewed and are negative . Patient's allergies and medications were reviewed. Patient's past medical, surgical, social , and family history were reviewed. Results for POC orders placed in visit on 09/21/22   POCT Urinalysis no Micro   Result Value Ref Range    Color, UA FIGUEROA     Clarity, UA CLOUDY     Glucose, UA POC NEG     Bilirubin, UA NEG     Ketones, UA NEG     Spec Grav, UA >=1.030     Blood, UA POC LARGE     pH, UA 6.0     Protein, UA      Urobilinogen, UA 0.2     Leukocytes, UA LARGE     Nitrite, UA POSITIVE       OBJECTIVE:  /70   Pulse 75   Temp 96.9 °F (36.1 °C)   Resp 17   Wt 285 lb (129.3 kg)   SpO2 96%   BMI 42.09 kg/m²     Physical Exam    General: NAD, cooperative, alert and oriented X 3. Mood / affect is good. good insight. well hydrated. Neck : no lymphadenopathy, supple, FROM  CV: Regular rate and rhythm , no murmurs/ rub/ gallop. No edema. Lungs : CTA bilaterally, breathing comfortably  Abdomen: positive bowel sounds, soft , suprapubic tenderness. No rebound/ guarding, non distended. No hepatosplenomegaly. No CVA tenderness. Skin: no rashes. Non tender. ASSESSMENT/  PLAN:  1. Urinary frequency  - Avoid/ minimize caffeine / alcohol.   - Push fluids - water.   - POCT Urinalysis no Micro  - Culture, Urine  - Treat empirically with LevoFLOXacin (LEVAQUIN) 500 MG tablet; Take 1 tablet by mouth daily for 7 days  Dispense: 7 tablet; Refill: 0  - Repeat urine culture in 1 week.      2. Hematuria, unspecified previous_biopsy_has_been_previously_biopsied type  - Push fluids- water  -  Avoid/ minimize caffeine / alcohol.   - POCT Urinalysis no Micro  - Culture, Urine  - Treat empirically with LevoFLOXacin (LEVAQUIN) 500 MG tablet; Take 1 tablet by mouth daily for 7 days  Dispense: 7 tablet; Refill: 0  - Microscopic urinalysis in 1 week. F/u if no improvement 7d/ prn increased symptoms.

## 2022-09-24 DIAGNOSIS — R31.9 HEMATURIA, UNSPECIFIED TYPE: ICD-10-CM

## 2022-09-24 DIAGNOSIS — N39.0 URINARY TRACT INFECTION WITH HEMATURIA, SITE UNSPECIFIED: Primary | ICD-10-CM

## 2022-09-24 DIAGNOSIS — R31.9 URINARY TRACT INFECTION WITH HEMATURIA, SITE UNSPECIFIED: Primary | ICD-10-CM

## 2022-09-24 LAB
ORGANISM: ABNORMAL
URINE CULTURE, ROUTINE: ABNORMAL

## 2022-09-26 NOTE — TELEPHONE ENCOUNTER
Requested Prescriptions     Pending Prescriptions Disp Refills    metFORMIN (GLUCOPHAGE) 500 MG tablet [Pharmacy Med Name: METFORMIN 500MG TABLETS] 30 tablet 5     Sig: TAKE 1 TABLET BY MOUTH DAILY WITH BREAKFAST     Last ov 6/9/2022  Last labs 5/11/22

## 2022-09-29 DIAGNOSIS — R31.9 HEMATURIA, UNSPECIFIED TYPE: ICD-10-CM

## 2022-09-29 DIAGNOSIS — N39.0 URINARY TRACT INFECTION WITH HEMATURIA, SITE UNSPECIFIED: ICD-10-CM

## 2022-09-29 DIAGNOSIS — R31.9 URINARY TRACT INFECTION WITH HEMATURIA, SITE UNSPECIFIED: ICD-10-CM

## 2022-09-30 LAB
BACTERIA: NORMAL /HPF
EPITHELIAL CELLS, UA: 1 /HPF (ref 0–5)
HYALINE CASTS: 0 /LPF (ref 0–8)
RBC UA: 1 /HPF (ref 0–4)
URINE TYPE: NORMAL
WBC UA: 1 /HPF (ref 0–5)

## 2022-10-01 LAB — URINE CULTURE, ROUTINE: NORMAL

## 2022-11-16 RX ORDER — LISINOPRIL AND HYDROCHLOROTHIAZIDE 25; 20 MG/1; MG/1
TABLET ORAL
Qty: 90 TABLET | Refills: 1 | Status: SHIPPED | OUTPATIENT
Start: 2022-11-16

## 2022-11-30 DIAGNOSIS — E78.00 HYPERCHOLESTEROLEMIA: Chronic | ICD-10-CM

## 2022-11-30 DIAGNOSIS — Z12.5 SCREENING FOR PROSTATE CANCER: ICD-10-CM

## 2022-11-30 DIAGNOSIS — R73.9 HYPERGLYCEMIA: ICD-10-CM

## 2022-11-30 DIAGNOSIS — I10 ESSENTIAL HYPERTENSION, BENIGN: Chronic | ICD-10-CM

## 2022-12-01 LAB
A/G RATIO: 2 (ref 1.1–2.2)
ALBUMIN SERPL-MCNC: 4.5 G/DL (ref 3.4–5)
ALP BLD-CCNC: 62 U/L (ref 40–129)
ALT SERPL-CCNC: 31 U/L (ref 10–40)
ANION GAP SERPL CALCULATED.3IONS-SCNC: 15 MMOL/L (ref 3–16)
AST SERPL-CCNC: 24 U/L (ref 15–37)
BILIRUB SERPL-MCNC: 0.6 MG/DL (ref 0–1)
BUN BLDV-MCNC: 15 MG/DL (ref 7–20)
CALCIUM SERPL-MCNC: 9.8 MG/DL (ref 8.3–10.6)
CHLORIDE BLD-SCNC: 96 MMOL/L (ref 99–110)
CHOLESTEROL, TOTAL: 232 MG/DL (ref 0–199)
CO2: 27 MMOL/L (ref 21–32)
CREAT SERPL-MCNC: 0.7 MG/DL (ref 0.8–1.3)
ESTIMATED AVERAGE GLUCOSE: 125.5 MG/DL
GFR SERPL CREATININE-BSD FRML MDRD: >60 ML/MIN/{1.73_M2}
GLUCOSE BLD-MCNC: 84 MG/DL (ref 70–99)
HBA1C MFR BLD: 6 %
HCT VFR BLD CALC: 48.3 % (ref 40.5–52.5)
HDLC SERPL-MCNC: 49 MG/DL (ref 40–60)
HEMOGLOBIN: 16 G/DL (ref 13.5–17.5)
LDL CHOLESTEROL CALCULATED: 161 MG/DL
MCH RBC QN AUTO: 31.4 PG (ref 26–34)
MCHC RBC AUTO-ENTMCNC: 33.1 G/DL (ref 31–36)
MCV RBC AUTO: 95 FL (ref 80–100)
PDW BLD-RTO: 13.3 % (ref 12.4–15.4)
PLATELET # BLD: 257 K/UL (ref 135–450)
PMV BLD AUTO: 8.4 FL (ref 5–10.5)
POTASSIUM SERPL-SCNC: 4.5 MMOL/L (ref 3.5–5.1)
PROSTATE SPECIFIC ANTIGEN: 0.53 NG/ML (ref 0–4)
RBC # BLD: 5.09 M/UL (ref 4.2–5.9)
SODIUM BLD-SCNC: 138 MMOL/L (ref 136–145)
TOTAL PROTEIN: 6.7 G/DL (ref 6.4–8.2)
TRIGL SERPL-MCNC: 112 MG/DL (ref 0–150)
TSH SERPL DL<=0.05 MIU/L-ACNC: 2.38 UIU/ML (ref 0.27–4.2)
VLDLC SERPL CALC-MCNC: 22 MG/DL
WBC # BLD: 8.8 K/UL (ref 4–11)

## 2022-12-06 ENCOUNTER — OFFICE VISIT (OUTPATIENT)
Dept: FAMILY MEDICINE CLINIC | Age: 62
End: 2022-12-06
Payer: COMMERCIAL

## 2022-12-06 VITALS
HEIGHT: 69 IN | BODY MASS INDEX: 43.37 KG/M2 | TEMPERATURE: 97.8 F | OXYGEN SATURATION: 98 % | RESPIRATION RATE: 16 BRPM | SYSTOLIC BLOOD PRESSURE: 132 MMHG | DIASTOLIC BLOOD PRESSURE: 82 MMHG | WEIGHT: 292.8 LBS | HEART RATE: 58 BPM

## 2022-12-06 DIAGNOSIS — M76.822 POSTERIOR TIBIAL TENDON DYSFUNCTION (PTTD) OF LEFT LOWER EXTREMITY: ICD-10-CM

## 2022-12-06 DIAGNOSIS — R73.9 HYPERGLYCEMIA: ICD-10-CM

## 2022-12-06 DIAGNOSIS — E66.01 MORBID OBESITY (HCC): ICD-10-CM

## 2022-12-06 DIAGNOSIS — G47.33 OSA (OBSTRUCTIVE SLEEP APNEA): ICD-10-CM

## 2022-12-06 DIAGNOSIS — I10 ESSENTIAL HYPERTENSION, BENIGN: Primary | Chronic | ICD-10-CM

## 2022-12-06 DIAGNOSIS — M17.0 PRIMARY OSTEOARTHRITIS OF BOTH KNEES: Chronic | ICD-10-CM

## 2022-12-06 DIAGNOSIS — E78.00 HYPERCHOLESTEROLEMIA: Chronic | ICD-10-CM

## 2022-12-06 DIAGNOSIS — Z87.891 PERSONAL HISTORY OF TOBACCO USE: ICD-10-CM

## 2022-12-06 DIAGNOSIS — M51.36 DDD (DEGENERATIVE DISC DISEASE), LUMBAR: Chronic | ICD-10-CM

## 2022-12-06 DIAGNOSIS — Z12.11 SCREEN FOR COLON CANCER: ICD-10-CM

## 2022-12-06 PROCEDURE — 3074F SYST BP LT 130 MM HG: CPT | Performed by: FAMILY MEDICINE

## 2022-12-06 PROCEDURE — 99214 OFFICE O/P EST MOD 30 MIN: CPT | Performed by: FAMILY MEDICINE

## 2022-12-06 PROCEDURE — 3078F DIAST BP <80 MM HG: CPT | Performed by: FAMILY MEDICINE

## 2022-12-06 PROCEDURE — G0296 VISIT TO DETERM LDCT ELIG: HCPCS | Performed by: FAMILY MEDICINE

## 2022-12-06 RX ORDER — OMEPRAZOLE 20 MG/1
CAPSULE, DELAYED RELEASE ORAL
Qty: 30 CAPSULE | Refills: 5 | Status: SHIPPED | OUTPATIENT
Start: 2022-12-06

## 2022-12-06 RX ORDER — ROSUVASTATIN CALCIUM 10 MG/1
10 TABLET, COATED ORAL NIGHTLY
Qty: 30 TABLET | Refills: 5 | Status: SHIPPED | OUTPATIENT
Start: 2022-12-06

## 2022-12-06 SDOH — ECONOMIC STABILITY: FOOD INSECURITY: WITHIN THE PAST 12 MONTHS, THE FOOD YOU BOUGHT JUST DIDN'T LAST AND YOU DIDN'T HAVE MONEY TO GET MORE.: NEVER TRUE

## 2022-12-06 SDOH — ECONOMIC STABILITY: FOOD INSECURITY: WITHIN THE PAST 12 MONTHS, YOU WORRIED THAT YOUR FOOD WOULD RUN OUT BEFORE YOU GOT MONEY TO BUY MORE.: NEVER TRUE

## 2022-12-06 ASSESSMENT — PATIENT HEALTH QUESTIONNAIRE - PHQ9
SUM OF ALL RESPONSES TO PHQ QUESTIONS 1-9: 0
SUM OF ALL RESPONSES TO PHQ9 QUESTIONS 1 & 2: 0
1. LITTLE INTEREST OR PLEASURE IN DOING THINGS: 0
SUM OF ALL RESPONSES TO PHQ QUESTIONS 1-9: 0
2. FEELING DOWN, DEPRESSED OR HOPELESS: 0

## 2022-12-06 ASSESSMENT — SOCIAL DETERMINANTS OF HEALTH (SDOH): HOW HARD IS IT FOR YOU TO PAY FOR THE VERY BASICS LIKE FOOD, HOUSING, MEDICAL CARE, AND HEATING?: NOT HARD AT ALL

## 2022-12-06 NOTE — PROGRESS NOTES
mass, no Lymphadenopathy  CV: Regular rate and rhythm, no murmur, rubs, gallops. No edema. Resp: Clear to auscultation bilaterally good air entry bilaterally  No crackles, wheeze. Breathing comfortably. Psych: mood stable, No suicidal thoughts or ideation       The 10-year ASCVD risk score (Mayra CHRISTENSEN, et al., 2019) is: 16.2%    Values used to calculate the score:      Age: 57 years      Sex: Male      Is Non- : No      Diabetic: No      Tobacco smoker: No      Systolic Blood Pressure: 695 mmHg      Is BP treated: Yes      HDL Cholesterol: 49 mg/dL      Total Cholesterol: 232 mg/dL         ASSESSMENT / PLAN:    1. Essential hypertension, benign  blood pressure stable @ goal, controlled  Cont lisinopril/hct    2. DDD (degenerative disc disease), lumbar  Stable w/o flare  Cont supportive therapy  Cont to monitor for development of weakness,neuropathy    3. Primary osteoarthritis of both knees  Cont supportive therapy  Does not feel sig enough to warrant surgery at this time    4. Posterior tibial tendon dysfunction (PTTD) of left lower extremity  Cont f/u with ortho    5. Hyperglycemia  A1c stable but high at 6.0  Cont supportive therapy  Cont metformin  Needs to work on lifestyle, weight loss    6. Hypercholesterolemia  Trending increase in LDL with high ASCVD risk  Did not tolerate lipitor  Trial crestor 10mg qd  Discussed use, benefit, and side effects of prescribed medications. Barriers to medication compliance addressed. All patient questions answered. Pt voiced understanding. 7. Personal history of tobacco use  Check f/u CT lung  - NM VISIT TO DISCUSS LUNG CA SCREEN W LDCT  - CT Lung Screen (Annual); Future    8. Screen for colon cancer  Overdue  Aware need to get set up    9. Morbid obesity ([address]  Cont to monitor with lifestyle mgt    10.  HORACIO (obstructive sleep apnea)  Aware risk of untreated obstructive sleep apnea  Reviewed HST with severe obstructive sleep apnea, AHI at 70+ and significant hypoxemia  - Esperanza Glynn MD, Sleep Medicine, [facility]           Follow-up appointment:   6 months  Pending CT scan  Prn     Discussed use, benefit, and side effects of all prescribed medications. Barriers to medication compliance addressed. All patient questions answered. Pt voiced understanding. When applicable, patient's outside records were [facility]. The patient has signed appropriate paperworks/consents. Discussed with the patient the current USPSTF guidelines released March 9, 2021 for screening for lung cancer. For adults aged 48 to [de-identified] years who have a 20 pack-year smoking history and currently smoke or have quit within the past 15 years the grade B recommendation is to:  Screen for lung cancer with low-dose computed tomography (LDCT) every year. Stop screening once a person has not smoked for 15 years or has a health problem that limits life expectancy or the ability to have lung surgery. The patient  reports that he quit smoking about 10 years ago. His smoking use included cigarettes. He has a 80.00 pack-year smoking history. He has never used smokeless tobacco.. Discussed with patient the risks and benefits of screening, including over-diagnosis, false positive rate, and total radiation exposure. The patient currently exhibits no signs or symptoms suggestive of lung cancer. Discussed with patient the importance of compliance with yearly annual lung cancer screenings and willingness to undergo diagnosis and treatment if screening scan is positive. In addition, the patient was counseled regarding the importance of remaining smoke free and/or total smoking cessation.     Also reviewed the following if the patient has Medicare that as of February 10, 2022, Medicare only covers LDCT screening in patients aged 51-72 with at least a 20 pack-year smoking history who currently smoke or have quit in the last 15 years

## 2022-12-06 NOTE — PATIENT INSTRUCTIONS

## 2023-07-18 ENCOUNTER — OFFICE VISIT (OUTPATIENT)
Dept: FAMILY MEDICINE CLINIC | Age: 63
End: 2023-07-18
Payer: COMMERCIAL

## 2023-07-18 VITALS
BODY MASS INDEX: 42.71 KG/M2 | DIASTOLIC BLOOD PRESSURE: 80 MMHG | SYSTOLIC BLOOD PRESSURE: 134 MMHG | RESPIRATION RATE: 16 BRPM | TEMPERATURE: 97.8 F | HEART RATE: 70 BPM | OXYGEN SATURATION: 95 % | WEIGHT: 289.2 LBS

## 2023-07-18 DIAGNOSIS — Z13.6 SCREENING FOR AAA (ABDOMINAL AORTIC ANEURYSM): ICD-10-CM

## 2023-07-18 DIAGNOSIS — Z12.11 SCREEN FOR COLON CANCER: ICD-10-CM

## 2023-07-18 DIAGNOSIS — L82.1 SK (SEBORRHEIC KERATOSIS): ICD-10-CM

## 2023-07-18 DIAGNOSIS — E78.00 HYPERCHOLESTEROLEMIA: Primary | Chronic | ICD-10-CM

## 2023-07-18 DIAGNOSIS — Z23 NEED FOR VACCINATION AGAINST STREPTOCOCCUS PNEUMONIAE: ICD-10-CM

## 2023-07-18 DIAGNOSIS — R79.89 LOW TESTOSTERONE: ICD-10-CM

## 2023-07-18 DIAGNOSIS — Z87.891 PERSONAL HISTORY OF TOBACCO USE: ICD-10-CM

## 2023-07-18 DIAGNOSIS — R73.9 HYPERGLYCEMIA: ICD-10-CM

## 2023-07-18 DIAGNOSIS — E66.01 MORBID OBESITY (HCC): Chronic | ICD-10-CM

## 2023-07-18 DIAGNOSIS — I10 ESSENTIAL HYPERTENSION, BENIGN: Chronic | ICD-10-CM

## 2023-07-18 DIAGNOSIS — R53.83 FATIGUE, UNSPECIFIED TYPE: ICD-10-CM

## 2023-07-18 DIAGNOSIS — G47.33 OSA (OBSTRUCTIVE SLEEP APNEA): ICD-10-CM

## 2023-07-18 PROCEDURE — 3079F DIAST BP 80-89 MM HG: CPT | Performed by: FAMILY MEDICINE

## 2023-07-18 PROCEDURE — 99214 OFFICE O/P EST MOD 30 MIN: CPT | Performed by: FAMILY MEDICINE

## 2023-07-18 PROCEDURE — 3075F SYST BP GE 130 - 139MM HG: CPT | Performed by: FAMILY MEDICINE

## 2023-07-18 PROCEDURE — 1123F ACP DISCUSS/DSCN MKR DOCD: CPT | Performed by: FAMILY MEDICINE

## 2023-07-18 SDOH — ECONOMIC STABILITY: FOOD INSECURITY: WITHIN THE PAST 12 MONTHS, YOU WORRIED THAT YOUR FOOD WOULD RUN OUT BEFORE YOU GOT MONEY TO BUY MORE.: NEVER TRUE

## 2023-07-18 SDOH — ECONOMIC STABILITY: INCOME INSECURITY: HOW HARD IS IT FOR YOU TO PAY FOR THE VERY BASICS LIKE FOOD, HOUSING, MEDICAL CARE, AND HEATING?: NOT HARD AT ALL

## 2023-07-18 SDOH — ECONOMIC STABILITY: HOUSING INSECURITY
IN THE LAST 12 MONTHS, WAS THERE A TIME WHEN YOU DID NOT HAVE A STEADY PLACE TO SLEEP OR SLEPT IN A SHELTER (INCLUDING NOW)?: NO

## 2023-07-18 SDOH — ECONOMIC STABILITY: FOOD INSECURITY: WITHIN THE PAST 12 MONTHS, THE FOOD YOU BOUGHT JUST DIDN'T LAST AND YOU DIDN'T HAVE MONEY TO GET MORE.: NEVER TRUE

## 2023-07-18 ASSESSMENT — PATIENT HEALTH QUESTIONNAIRE - PHQ9
SUM OF ALL RESPONSES TO PHQ QUESTIONS 1-9: 0
1. LITTLE INTEREST OR PLEASURE IN DOING THINGS: 0
SUM OF ALL RESPONSES TO PHQ QUESTIONS 1-9: 0
SUM OF ALL RESPONSES TO PHQ QUESTIONS 1-9: 0
SUM OF ALL RESPONSES TO PHQ9 QUESTIONS 1 & 2: 0
2. FEELING DOWN, DEPRESSED OR HOPELESS: 0
SUM OF ALL RESPONSES TO PHQ QUESTIONS 1-9: 0

## 2023-07-19 DIAGNOSIS — R79.89 LOW TESTOSTERONE: ICD-10-CM

## 2023-07-19 DIAGNOSIS — R73.9 HYPERGLYCEMIA: ICD-10-CM

## 2023-07-19 DIAGNOSIS — E78.00 HYPERCHOLESTEROLEMIA: Chronic | ICD-10-CM

## 2023-07-19 LAB
ALBUMIN SERPL-MCNC: 4.2 G/DL (ref 3.4–5)
ALBUMIN/GLOB SERPL: 2.3 {RATIO} (ref 1.1–2.2)
ALP SERPL-CCNC: 62 U/L (ref 40–129)
ALT SERPL-CCNC: 21 U/L (ref 10–40)
ANION GAP SERPL CALCULATED.3IONS-SCNC: 12 MMOL/L (ref 3–16)
AST SERPL-CCNC: 17 U/L (ref 15–37)
BILIRUB SERPL-MCNC: 0.3 MG/DL (ref 0–1)
BUN SERPL-MCNC: 18 MG/DL (ref 7–20)
CALCIUM SERPL-MCNC: 9.2 MG/DL (ref 8.3–10.6)
CHLORIDE SERPL-SCNC: 101 MMOL/L (ref 99–110)
CHOLEST SERPL-MCNC: 160 MG/DL (ref 0–199)
CO2 SERPL-SCNC: 26 MMOL/L (ref 21–32)
CREAT SERPL-MCNC: 0.8 MG/DL (ref 0.8–1.3)
GFR SERPLBLD CREATININE-BSD FMLA CKD-EPI: >60 ML/MIN/{1.73_M2}
GLUCOSE SERPL-MCNC: 126 MG/DL (ref 70–99)
HDLC SERPL-MCNC: 37 MG/DL (ref 40–60)
LDLC SERPL CALC-MCNC: 104 MG/DL
POTASSIUM SERPL-SCNC: 4.8 MMOL/L (ref 3.5–5.1)
PROT SERPL-MCNC: 6 G/DL (ref 6.4–8.2)
SODIUM SERPL-SCNC: 139 MMOL/L (ref 136–145)
TRIGL SERPL-MCNC: 97 MG/DL (ref 0–150)
VLDLC SERPL CALC-MCNC: 19 MG/DL

## 2023-07-20 LAB
EST. AVERAGE GLUCOSE BLD GHB EST-MCNC: 128.4 MG/DL
HBA1C MFR BLD: 6.1 %

## 2023-07-21 LAB — TESTOST SERPL-MCNC: 253 NG/DL (ref 220–1000)

## 2023-07-30 RX ORDER — METFORMIN HYDROCHLORIDE 500 MG/1
500 TABLET, EXTENDED RELEASE ORAL
Qty: 90 TABLET | Refills: 1 | Status: SHIPPED | OUTPATIENT
Start: 2023-07-30

## 2023-09-20 ENCOUNTER — APPOINTMENT (OUTPATIENT)
Dept: GENERAL RADIOLOGY | Age: 63
DRG: 164 | End: 2023-09-20
Payer: MEDICARE

## 2023-09-20 ENCOUNTER — OFFICE VISIT (OUTPATIENT)
Dept: FAMILY MEDICINE CLINIC | Age: 63
End: 2023-09-20

## 2023-09-20 ENCOUNTER — APPOINTMENT (OUTPATIENT)
Dept: CT IMAGING | Age: 63
DRG: 164 | End: 2023-09-20
Payer: MEDICARE

## 2023-09-20 ENCOUNTER — HOSPITAL ENCOUNTER (EMERGENCY)
Dept: INTERVENTIONAL RADIOLOGY/VASCULAR | Age: 63
Discharge: HOME | DRG: 164 | End: 2023-09-20
Attending: STUDENT IN AN ORGANIZED HEALTH CARE EDUCATION/TRAINING PROGRAM
Payer: MEDICARE

## 2023-09-20 ENCOUNTER — HOSPITAL ENCOUNTER (INPATIENT)
Age: 63
LOS: 3 days | Discharge: HOME | DRG: 164 | End: 2023-09-23
Attending: STUDENT IN AN ORGANIZED HEALTH CARE EDUCATION/TRAINING PROGRAM | Admitting: STUDENT IN AN ORGANIZED HEALTH CARE EDUCATION/TRAINING PROGRAM
Payer: MEDICARE

## 2023-09-20 VITALS
BODY MASS INDEX: 41.47 KG/M2 | HEART RATE: 82 BPM | HEIGHT: 69 IN | OXYGEN SATURATION: 92 % | DIASTOLIC BLOOD PRESSURE: 88 MMHG | SYSTOLIC BLOOD PRESSURE: 122 MMHG | WEIGHT: 280 LBS | TEMPERATURE: 98.5 F

## 2023-09-20 DIAGNOSIS — R06.02 SHORTNESS OF BREATH: Primary | ICD-10-CM

## 2023-09-20 DIAGNOSIS — R07.9 CHEST PAIN ON EXERTION: ICD-10-CM

## 2023-09-20 DIAGNOSIS — R06.09 OTHER FORM OF DYSPNEA: Primary | ICD-10-CM

## 2023-09-20 PROBLEM — I26.99 PULMONARY EMBOLISM, BILATERAL (HCC): Status: ACTIVE | Noted: 2023-09-20

## 2023-09-20 LAB
ANION GAP SERPL CALCULATED.3IONS-SCNC: 13 MMOL/L (ref 3–16)
ANION GAP SERPL CALCULATED.3IONS-SCNC: 15 MMOL/L (ref 3–16)
BASOPHILS # BLD: 0.1 K/UL (ref 0–0.2)
BASOPHILS NFR BLD: 0.6 %
BUN SERPL-MCNC: 16 MG/DL (ref 7–20)
BUN SERPL-MCNC: 16 MG/DL (ref 7–20)
CALCIUM SERPL-MCNC: 9.5 MG/DL (ref 8.3–10.6)
CALCIUM SERPL-MCNC: 9.7 MG/DL (ref 8.3–10.6)
CHLORIDE SERPL-SCNC: 98 MMOL/L (ref 99–110)
CHLORIDE SERPL-SCNC: 98 MMOL/L (ref 99–110)
CO2 SERPL-SCNC: 23 MMOL/L (ref 21–32)
CO2 SERPL-SCNC: 25 MMOL/L (ref 21–32)
CREAT SERPL-MCNC: 0.8 MG/DL (ref 0.8–1.3)
CREAT SERPL-MCNC: 0.8 MG/DL (ref 0.8–1.3)
D DIMER: 6.3 UG/ML FEU (ref 0–0.6)
DEPRECATED RDW RBC AUTO: 13.5 % (ref 12.4–15.4)
EKG ATRIAL RATE: 84 BPM
EKG DIAGNOSIS: NORMAL
EKG P AXIS: 68 DEGREES
EKG P-R INTERVAL: 172 MS
EKG Q-T INTERVAL: 426 MS
EKG QRS DURATION: 72 MS
EKG QTC CALCULATION (BAZETT): 503 MS
EKG R AXIS: -35 DEGREES
EKG T AXIS: -62 DEGREES
EKG VENTRICULAR RATE: 84 BPM
EOSINOPHIL # BLD: 0.1 K/UL (ref 0–0.6)
EOSINOPHIL NFR BLD: 1.1 %
GFR SERPLBLD CREATININE-BSD FMLA CKD-EPI: >60 ML/MIN/{1.73_M2}
GFR SERPLBLD CREATININE-BSD FMLA CKD-EPI: >60 ML/MIN/{1.73_M2}
GLUCOSE BLD-MCNC: 101 MG/DL (ref 70–99)
GLUCOSE BLD-MCNC: 88 MG/DL (ref 70–99)
GLUCOSE SERPL-MCNC: 103 MG/DL (ref 70–99)
GLUCOSE SERPL-MCNC: 111 MG/DL (ref 70–99)
HCT VFR BLD AUTO: 48.1 % (ref 40.5–52.5)
HGB BLD-MCNC: 16.2 G/DL (ref 13.5–17.5)
LYMPHOCYTES # BLD: 2.2 K/UL (ref 1–5.1)
LYMPHOCYTES NFR BLD: 16.9 %
MCH RBC QN AUTO: 30.8 PG (ref 26–34)
MCHC RBC AUTO-ENTMCNC: 33.7 G/DL (ref 31–36)
MCV RBC AUTO: 91.3 FL (ref 80–100)
MONOCYTES # BLD: 1 K/UL (ref 0–1.3)
MONOCYTES NFR BLD: 7.6 %
NEUTROPHILS # BLD: 9.6 K/UL (ref 1.7–7.7)
NEUTROPHILS NFR BLD: 73.8 %
NT-PROBNP SERPL-MCNC: 2993 PG/ML (ref 0–124)
PERFORMED ON: ABNORMAL
PERFORMED ON: NORMAL
PLATELET # BLD AUTO: 199 K/UL (ref 135–450)
PMV BLD AUTO: 8.7 FL (ref 5–10.5)
POC ACT LR: 203 SEC
POC ACT LR: 234 SEC
POTASSIUM SERPL-SCNC: 4.3 MMOL/L (ref 3.5–5.1)
POTASSIUM SERPL-SCNC: 5.5 MMOL/L (ref 3.5–5.1)
RBC # BLD AUTO: 5.27 M/UL (ref 4.2–5.9)
SODIUM SERPL-SCNC: 136 MMOL/L (ref 136–145)
SODIUM SERPL-SCNC: 136 MMOL/L (ref 136–145)
TROPONIN, HIGH SENSITIVITY: 44 NG/L (ref 0–22)
TROPONIN, HIGH SENSITIVITY: 48 NG/L (ref 0–22)
WBC # BLD AUTO: 13 K/UL (ref 4–11)

## 2023-09-20 PROCEDURE — 99152 MOD SED SAME PHYS/QHP 5/>YRS: CPT

## 2023-09-20 PROCEDURE — 80048 BASIC METABOLIC PNL TOTAL CA: CPT

## 2023-09-20 PROCEDURE — 85379 FIBRIN DEGRADATION QUANT: CPT

## 2023-09-20 PROCEDURE — 36569 INSJ PICC 5 YR+ W/O IMAGING: CPT

## 2023-09-20 PROCEDURE — 83880 ASSAY OF NATRIURETIC PEPTIDE: CPT

## 2023-09-20 PROCEDURE — 37184 PRIM ART M-THRMBC 1ST VSL: CPT

## 2023-09-20 PROCEDURE — 2500000003 HC RX 250 WO HCPCS

## 2023-09-20 PROCEDURE — 75743 ARTERY X-RAYS LUNGS: CPT

## 2023-09-20 PROCEDURE — 71046 X-RAY EXAM CHEST 2 VIEWS: CPT

## 2023-09-20 PROCEDURE — 2720000010 HC SURG SUPPLY STERILE

## 2023-09-20 PROCEDURE — C1894 INTRO/SHEATH, NON-LASER: HCPCS

## 2023-09-20 PROCEDURE — 36415 COLL VENOUS BLD VENIPUNCTURE: CPT

## 2023-09-20 PROCEDURE — 6360000002 HC RX W HCPCS

## 2023-09-20 PROCEDURE — 84484 ASSAY OF TROPONIN QUANT: CPT

## 2023-09-20 PROCEDURE — 2060000000 HC ICU INTERMEDIATE R&B

## 2023-09-20 PROCEDURE — 93005 ELECTROCARDIOGRAM TRACING: CPT | Performed by: STUDENT IN AN ORGANIZED HEALTH CARE EDUCATION/TRAINING PROGRAM

## 2023-09-20 PROCEDURE — 99285 EMERGENCY DEPT VISIT HI MDM: CPT

## 2023-09-20 PROCEDURE — C1757 CATH, THROMBECTOMY/EMBOLECT: HCPCS

## 2023-09-20 PROCEDURE — 2709999900 HC NON-CHARGEABLE SUPPLY

## 2023-09-20 PROCEDURE — C1751 CATH, INF, PER/CENT/MIDLINE: HCPCS

## 2023-09-20 PROCEDURE — 99153 MOD SED SAME PHYS/QHP EA: CPT

## 2023-09-20 PROCEDURE — 6360000004 HC RX CONTRAST MEDICATION: Performed by: PHYSICIAN ASSISTANT

## 2023-09-20 PROCEDURE — 85520 HEPARIN ASSAY: CPT

## 2023-09-20 PROCEDURE — 2580000003 HC RX 258: Performed by: STUDENT IN AN ORGANIZED HEALTH CARE EDUCATION/TRAINING PROGRAM

## 2023-09-20 PROCEDURE — 02HV33Z INSERTION OF INFUSION DEVICE INTO SUPERIOR VENA CAVA, PERCUTANEOUS APPROACH: ICD-10-PCS | Performed by: HOSPITALIST

## 2023-09-20 PROCEDURE — 85025 COMPLETE CBC W/AUTO DIFF WBC: CPT

## 2023-09-20 PROCEDURE — 6360000002 HC RX W HCPCS: Performed by: STUDENT IN AN ORGANIZED HEALTH CARE EDUCATION/TRAINING PROGRAM

## 2023-09-20 PROCEDURE — 36014 PLACE CATHETER IN ARTERY: CPT

## 2023-09-20 PROCEDURE — 02CR3ZZ EXTIRPATION OF MATTER FROM LEFT PULMONARY ARTERY, PERCUTANEOUS APPROACH: ICD-10-PCS | Performed by: STUDENT IN AN ORGANIZED HEALTH CARE EDUCATION/TRAINING PROGRAM

## 2023-09-20 PROCEDURE — 85347 COAGULATION TIME ACTIVATED: CPT

## 2023-09-20 PROCEDURE — 71260 CT THORAX DX C+: CPT

## 2023-09-20 PROCEDURE — C1769 GUIDE WIRE: HCPCS

## 2023-09-20 RX ORDER — SODIUM CHLORIDE 0.9 % (FLUSH) 0.9 %
5-40 SYRINGE (ML) INJECTION PRN
Status: DISCONTINUED | OUTPATIENT
Start: 2023-09-20 | End: 2023-09-23 | Stop reason: HOSPADM

## 2023-09-20 RX ORDER — INSULIN LISPRO 100 [IU]/ML
0-4 INJECTION, SOLUTION INTRAVENOUS; SUBCUTANEOUS
Status: DISCONTINUED | OUTPATIENT
Start: 2023-09-20 | End: 2023-09-22

## 2023-09-20 RX ORDER — ACETAMINOPHEN 325 MG/1
650 TABLET ORAL EVERY 6 HOURS PRN
Status: DISCONTINUED | OUTPATIENT
Start: 2023-09-20 | End: 2023-09-23 | Stop reason: HOSPADM

## 2023-09-20 RX ORDER — POLYETHYLENE GLYCOL 3350 17 G/17G
17 POWDER, FOR SOLUTION ORAL DAILY PRN
Status: DISCONTINUED | OUTPATIENT
Start: 2023-09-20 | End: 2023-09-23 | Stop reason: HOSPADM

## 2023-09-20 RX ORDER — DEXTROSE MONOHYDRATE 100 MG/ML
INJECTION, SOLUTION INTRAVENOUS CONTINUOUS PRN
Status: DISCONTINUED | OUTPATIENT
Start: 2023-09-20 | End: 2023-09-23 | Stop reason: HOSPADM

## 2023-09-20 RX ORDER — SODIUM CHLORIDE 0.9 % (FLUSH) 0.9 %
5-40 SYRINGE (ML) INJECTION EVERY 12 HOURS SCHEDULED
Status: DISCONTINUED | OUTPATIENT
Start: 2023-09-20 | End: 2023-09-23 | Stop reason: HOSPADM

## 2023-09-20 RX ORDER — LISINOPRIL 20 MG/1
20 TABLET ORAL DAILY
Status: DISCONTINUED | OUTPATIENT
Start: 2023-09-21 | End: 2023-09-23 | Stop reason: HOSPADM

## 2023-09-20 RX ORDER — SODIUM CHLORIDE 9 MG/ML
25 INJECTION, SOLUTION INTRAVENOUS PRN
Status: DISCONTINUED | OUTPATIENT
Start: 2023-09-20 | End: 2023-09-23 | Stop reason: HOSPADM

## 2023-09-20 RX ORDER — ONDANSETRON 4 MG/1
4 TABLET, ORALLY DISINTEGRATING ORAL EVERY 8 HOURS PRN
Status: DISCONTINUED | OUTPATIENT
Start: 2023-09-20 | End: 2023-09-21

## 2023-09-20 RX ORDER — HEPARIN SODIUM 1000 [USP'U]/ML
5000 INJECTION, SOLUTION INTRAVENOUS; SUBCUTANEOUS PRN
Status: DISCONTINUED | OUTPATIENT
Start: 2023-09-20 | End: 2023-09-23 | Stop reason: SDUPTHER

## 2023-09-20 RX ORDER — LISINOPRIL AND HYDROCHLOROTHIAZIDE 25; 20 MG/1; MG/1
1 TABLET ORAL DAILY
Status: DISCONTINUED | OUTPATIENT
Start: 2023-09-21 | End: 2023-09-20

## 2023-09-20 RX ORDER — INSULIN LISPRO 100 [IU]/ML
0-4 INJECTION, SOLUTION INTRAVENOUS; SUBCUTANEOUS NIGHTLY
Status: DISCONTINUED | OUTPATIENT
Start: 2023-09-20 | End: 2023-09-22

## 2023-09-20 RX ORDER — SODIUM CHLORIDE 9 MG/ML
INJECTION, SOLUTION INTRAVENOUS CONTINUOUS
Status: ACTIVE | OUTPATIENT
Start: 2023-09-20 | End: 2023-09-21

## 2023-09-20 RX ORDER — HEPARIN SODIUM 1000 [USP'U]/ML
10000 INJECTION, SOLUTION INTRAVENOUS; SUBCUTANEOUS ONCE
Status: DISCONTINUED | OUTPATIENT
Start: 2023-09-20 | End: 2023-09-23 | Stop reason: SDUPTHER

## 2023-09-20 RX ORDER — HEPARIN SODIUM 1000 [USP'U]/ML
10000 INJECTION, SOLUTION INTRAVENOUS; SUBCUTANEOUS PRN
Status: DISCONTINUED | OUTPATIENT
Start: 2023-09-20 | End: 2023-09-23 | Stop reason: SDUPTHER

## 2023-09-20 RX ORDER — ACETAMINOPHEN 650 MG/1
650 SUPPOSITORY RECTAL EVERY 6 HOURS PRN
Status: DISCONTINUED | OUTPATIENT
Start: 2023-09-20 | End: 2023-09-23 | Stop reason: HOSPADM

## 2023-09-20 RX ORDER — ONDANSETRON 2 MG/ML
4 INJECTION INTRAMUSCULAR; INTRAVENOUS EVERY 6 HOURS PRN
Status: DISCONTINUED | OUTPATIENT
Start: 2023-09-20 | End: 2023-09-21

## 2023-09-20 RX ORDER — HYDROCODONE BITARTRATE AND ACETAMINOPHEN 5; 325 MG/1; MG/1
1 TABLET ORAL EVERY 6 HOURS PRN
Status: DISCONTINUED | OUTPATIENT
Start: 2023-09-20 | End: 2023-09-23 | Stop reason: HOSPADM

## 2023-09-20 RX ORDER — HYDROCHLOROTHIAZIDE 25 MG/1
25 TABLET ORAL DAILY
Status: DISCONTINUED | OUTPATIENT
Start: 2023-09-21 | End: 2023-09-23 | Stop reason: HOSPADM

## 2023-09-20 RX ORDER — LIDOCAINE HYDROCHLORIDE 10 MG/ML
5 INJECTION, SOLUTION EPIDURAL; INFILTRATION; INTRACAUDAL; PERINEURAL ONCE
Status: DISCONTINUED | OUTPATIENT
Start: 2023-09-20 | End: 2023-09-23 | Stop reason: HOSPADM

## 2023-09-20 RX ORDER — METHOCARBAMOL 500 MG/1
500 TABLET, FILM COATED ORAL 4 TIMES DAILY PRN
Status: DISCONTINUED | OUTPATIENT
Start: 2023-09-20 | End: 2023-09-23 | Stop reason: HOSPADM

## 2023-09-20 RX ORDER — SODIUM CHLORIDE 9 MG/ML
INJECTION, SOLUTION INTRAVENOUS PRN
Status: DISCONTINUED | OUTPATIENT
Start: 2023-09-20 | End: 2023-09-23 | Stop reason: HOSPADM

## 2023-09-20 RX ORDER — HEPARIN SODIUM 10000 [USP'U]/100ML
5-30 INJECTION, SOLUTION INTRAVENOUS CONTINUOUS
Status: DISCONTINUED | OUTPATIENT
Start: 2023-09-20 | End: 2023-09-23 | Stop reason: SDUPTHER

## 2023-09-20 RX ADMIN — IOPAMIDOL 75 ML: 755 INJECTION, SOLUTION INTRAVENOUS at 13:36

## 2023-09-20 RX ADMIN — SODIUM CHLORIDE, PRESERVATIVE FREE 10 ML: 5 INJECTION INTRAVENOUS at 23:09

## 2023-09-20 RX ADMIN — HEPARIN SODIUM 16 UNITS/KG/HR: 10000 INJECTION, SOLUTION INTRAVENOUS at 21:00

## 2023-09-20 ASSESSMENT — ENCOUNTER SYMPTOMS
COUGH: 1
VOMITING: 0
COUGH: 1
ABDOMINAL PAIN: 0
NAUSEA: 0
DIARRHEA: 0
ABDOMINAL DISTENTION: 0
COLOR CHANGE: 0
SHORTNESS OF BREATH: 1
ABDOMINAL PAIN: 0
SHORTNESS OF BREATH: 1
CHEST TIGHTNESS: 1
WHEEZING: 0

## 2023-09-20 ASSESSMENT — PAIN DESCRIPTION - ORIENTATION
ORIENTATION: LOWER
ORIENTATION: LOWER

## 2023-09-20 ASSESSMENT — PAIN DESCRIPTION - DESCRIPTORS
DESCRIPTORS: ACHING
DESCRIPTORS: ACHING

## 2023-09-20 ASSESSMENT — PAIN DESCRIPTION - ONSET
ONSET: ON-GOING
ONSET: ON-GOING

## 2023-09-20 ASSESSMENT — PAIN DESCRIPTION - FREQUENCY
FREQUENCY: INTERMITTENT
FREQUENCY: INTERMITTENT

## 2023-09-20 ASSESSMENT — PAIN DESCRIPTION - LOCATION
LOCATION: BACK
LOCATION: BACK

## 2023-09-20 ASSESSMENT — PAIN - FUNCTIONAL ASSESSMENT
PAIN_FUNCTIONAL_ASSESSMENT: ACTIVITIES ARE NOT PREVENTED
PAIN_FUNCTIONAL_ASSESSMENT: NONE - DENIES PAIN
PAIN_FUNCTIONAL_ASSESSMENT: ACTIVITIES ARE NOT PREVENTED

## 2023-09-20 ASSESSMENT — PAIN SCALES - GENERAL
PAINLEVEL_OUTOF10: 4
PAINLEVEL_OUTOF10: 8

## 2023-09-20 ASSESSMENT — PAIN DESCRIPTION - PAIN TYPE
TYPE: CHRONIC PAIN
TYPE: CHRONIC PAIN

## 2023-09-20 NOTE — CONSULTS
IR Consultation History & Physical    Patient ID: Marii Morales  MRN:  [id number]  YOB: 1960  Allergies   Allergen Reactions    Lipitor [Atorvastatin]      Dizziness      Penicillins Rash       Date of Consultation:  9/20/2023    Chief Complaint: shortness of breath; chest tightness     History of Present Illness:     Marii Morales is a 70 y.o.  male who presents with a 2-3 day history of shortness of breath and chest tightness in setting of productive cough for one week. Patient states he is short of breath during everyday activities. Patient states he is feeling less short of breath today, but the chest discomfort is more concerning. Patient denies recent travel, no recent surgery or hospitalization. Past Medical History:   Diagnosis Date    Cholesteatoma     DDD (degenerative disc disease), lumbar     Erectile dysfunction     Essential hypertension, benign     GERD (gastroesophageal reflux disease) 6/20/2014    Hypercholesterolemia     Hyperglycemia 8/13/2021    Low testosterone 1/27/2014    Morbid obesity ([address]) 3/10/2016    Nontraumatic rupture of tendons of biceps (long head)     HORACIO (obstructive sleep apnea) 12/6/2022    Plantar fasciitis     Primary osteoarthritis of both knees 5/19/2017    Primary osteoarthritis of left ankle 5/19/2017       Past Surgical History:   Procedure Laterality Date    BICEPS TENDON REPAIR Left     CATARACT REMOVAL      LUMBAR FUSION N/A 2/20/2020    L4, S1 ANTERIOR LUMBAR INTERBODY FUSION WITH PEDICLE SCREW FIXATION performed by Matthieu Greer MD at [address] [place] [address] Right     TONSILLECTOMY AND ADENOIDECTOMY      VASECTOMY         Home Medications:   Prior to Admission medications    Medication Sig Start Date End Date Taking?  Authorizing Provider   metFORMIN (GLUCOPHAGE-XR) 500 MG extended release tablet Take 1 tablet by mouth daily (with breakfast) 7/30/23   Jude Frankel MD   lisinopril-hydroCHLOROthiazide (PRINZIDE;ZESTORETIC) 20-25 MG per

## 2023-09-20 NOTE — ED NOTES
Spoke with lab will be adding bmp to troponin currently in process.       Anderson Bain RN  09/20/23 3458

## 2023-09-20 NOTE — PROGRESS NOTES
IR Consultation History & Physical    Patient ID: Piedad Spence  MRN:  [id number]  YOB: 1960  Allergies   Allergen Reactions    Lipitor [Atorvastatin]      Dizziness      Penicillins Rash       Date of Consultation:  9/20/2023    Chief Complaint: shortness of breath; chest tightness     History of Present Illness:     Piedad Spence is a 70 y.o.  male who presents with a 2-3 day history of shortness of breath and chest tightness in setting of productive cough for one week. Patient states he is short of breath during everyday activities. Patient states he is feeling less short of breath today, but the chest discomfort is more concerning. Patient denies recent travel, no recent surgery or hospitalization. Past Medical History:   Diagnosis Date    Cholesteatoma     DDD (degenerative disc disease), lumbar     Erectile dysfunction     Essential hypertension, benign     GERD (gastroesophageal reflux disease) 6/20/2014    Hypercholesterolemia     Hyperglycemia 8/13/2021    Low testosterone 1/27/2014    Morbid obesity ([address]) 3/10/2016    Nontraumatic rupture of tendons of biceps (long head)     HORACIO (obstructive sleep apnea) 12/6/2022    Plantar fasciitis     Primary osteoarthritis of both knees 5/19/2017    Primary osteoarthritis of left ankle 5/19/2017       Past Surgical History:   Procedure Laterality Date    BICEPS TENDON REPAIR Left     CATARACT REMOVAL      LUMBAR FUSION N/A 2/20/2020    L4, S1 ANTERIOR LUMBAR INTERBODY FUSION WITH PEDICLE SCREW FIXATION performed by Tae Mary MD at [address] [place] [address] Right     TONSILLECTOMY AND ADENOIDECTOMY      VASECTOMY         Home Medications:   Prior to Admission medications    Medication Sig Start Date End Date Taking?  Authorizing Provider   metFORMIN (GLUCOPHAGE-XR) 500 MG extended release tablet Take 1 tablet by mouth daily (with breakfast) 7/30/23   Jillian Modi MD   lisinopril-hydroCHLOROthiazide (PRINZIDE;ZESTORETIC) 20-25 MG per

## 2023-09-20 NOTE — ASSESSMENT & PLAN NOTE
ECG shows negative T waves- anterior ischemia   This is new compared to prior ECG  He is having exertional symptoms  We will send to ER for further evaluation and potential stress test/cath  Discussed findings with patient he is agreeable and will go to the ER now  Wife present at bedside

## 2023-09-20 NOTE — ED PROVIDER NOTES
[facility] ENCOUNTER          PHYSICIAN ASSISTANT NOTE       Date of evaluation: 9/20/2023    Chief Complaint     Shortness of breath    History of Present Illness     Radha Moon is a 70 y.o. male with a past medical history of hypertension, hypercholesteremia, GERD who was seen by his primary care provider today complaining of a cough ongoing for a week that is productive. He began to feel short of breath 2 to 3 days ago which is worse when talking or exerting himself and then over the past 2 days has begun to feel some chest discomfort, particularly when trying to cough. Patient feels as if he has expectorant in his chest he is unable to produce. He describes it as feeling like a \"ball. \"  He denies prior similar symptoms. He denies fevers, headache, abdominal pain, nausea, vomiting, change in bowel or bladder function. Review of Systems     Review of Systems   Constitutional:  Negative for chills and fever. Respiratory:  Positive for cough, chest tightness and shortness of breath. Cardiovascular:  Positive for chest pain. Gastrointestinal:  Negative for abdominal pain, diarrhea, nausea and vomiting. Genitourinary:  Negative for dysuria. Neurological:  Negative for dizziness, light-headedness and headaches. Past Medical, Surgical, Family, and Social History     He has a past medical history of Cholesteatoma, DDD (degenerative disc disease), lumbar, Erectile dysfunction, Essential hypertension, benign, GERD (gastroesophageal reflux disease), Hypercholesterolemia, Hyperglycemia, Low testosterone, Morbid obesity ([address]), Nontraumatic rupture of tendons of biceps (long head), HORACIO (obstructive sleep apnea), Plantar fasciitis, Primary osteoarthritis of both knees, and Primary osteoarthritis of left ankle. He has a past surgical history that includes Cataract removal; Middle ear surgery (Right); Vasectomy; Tonsillectomy and adenoidectomy;  Biceps tendon repair [id number]  D-dimer is 6.30      CXR, CTPA:  CT CHEST PULMONARY EMBOLISM W CONTRAST   Final Result      1. Evidence of large volume acute bilateral pulmonary embolus with elevated RV   LV ratio. Critical results reported to Dr. Rosas Batres and  interventional radiology at   9/20/2023 1:55 p.m. XR CHEST (2 VW)   Final Result   1. No acute cardiopulmonary findings. Following CT results I consulted IR. The patient was subsequently taken to the Cath Lab. IR called me back to let me know they had the patient in the Cath Lab for procedure and that he should be admitted. I will admit the patient to the PCU. I spoke with the intensivist prior to admission and communicated the case to the hospitalist.    Is this patient to be included in the SEP-1 core measure? No Exclusion criteria - the patient is NOT to be included for SEP-1 Core Measure due to: 2+ SIRS criteria are not met    This patient was also evaluated by the attending physician. All care plans were discussed and agreed upon. Clinical Impression     1. Other form of dyspnea        Disposition     PATIENT REFERRED TO:  No follow-up provider specified.     DISCHARGE MEDICATIONS:  New Prescriptions    No medications on file       TORIE Gresham  09/20/23 4703

## 2023-09-20 NOTE — PROCEDURES
Interventional Radiology Post Procedure    Date: 9/20/2023    Physician: Omayra Hoang MD    Pre-op Diagnosis: PE with right heart strain    Post-op Diagnosis: same    Variation from Planned Procedure: None       Findings: successful mechanical thrombectomy of the pulmonary artery    Patient condition: Stable    Estimated Blood Loss: 50 cc    Specimens:  none sent      Plan:   - bedrest x 2 hours with right leg extended  - left great saphenous sheath may be removed once reliable venous access is obtained  - heparin gtt while inpatient, expect some oozing at left groin sheath  - bilateral LE doppler to look for DVT  - Echocardiogram to assess for resolution of right heart strain  - OK to eat from IR perspective    Please call with questions or concerns.     Signed,  Rena Castillo MD  5:18 PM  9/20/2023

## 2023-09-20 NOTE — H&P
(7/18/2023)    Hunger Vital Sign     Worried About Running Out of Food in the Last Year: Never true     [address] in the Last Year: Never true   Transportation Needs: Unknown (7/18/2023)    PRAPARE - Transportation     Lack of Transportation (Non-Medical): No   Housing Stability: Unknown (7/18/2023)    Housing Stability Vital Sign     Unstable Housing in the Last Year: No       Medications:   Medications:    Infusions:   PRN Meds:     Labs      CBC:   Recent Labs     09/20/23  1200   WBC 13.0*   HGB 16.2        BMP:    Recent Labs     09/20/23  1203 09/20/23  1244    136   K 5.5* 4.3   CL 98* 98*   CO2 25 23   BUN 16 16   CREATININE 0.8 0.8   GLUCOSE 111* 103*     Hepatic: No results for input(s): \"AST\", \"ALT\", \"ALB\", \"BILITOT\", \"ALKPHOS\" in the last 72 hours. Lipids:   Lab Results   Component Value Date/Time    CHOL 160 07/19/2023 08:01 AM    HDL 37 07/19/2023 08:01 AM    HDL 47 03/30/2012 04:14 PM    TRIG 97 07/19/2023 08:01 AM     Hemoglobin A1C:   Lab Results   Component Value Date/Time    LABA1C 6.1 07/19/2023 08:01 AM     TSH:   Lab Results   Component Value Date/Time    TSH 2.38 11/30/2022 03:44 PM     Troponin: No results found for: \"TROPONINT\"  Lactic Acid: No results for input(s): \"LACTA\" in the last 72 hours.   BNP:   Recent Labs     09/20/23  1203   PROBNP 2,993*     UA:  Lab Results   Component Value Date/Time    COLORU FIGUEROA 09/21/2022 10:03 AM    PHUR 6.0 09/21/2022 10:03 AM    WBCUA 1 09/29/2022 04:14 PM    RBCUA 1 09/29/2022 04:14 PM    BACTERIA None Seen 09/29/2022 04:14 PM    CLARITYU CLOUDY 09/21/2022 10:03 AM    SPECGRAV >=1.030 09/21/2022 10:03 AM    LEUKOCYTESUR LARGE 09/21/2022 10:03 AM    BILIRUBINUR NEG 09/21/2022 10:03 AM    BLOODU LARGE 09/21/2022 10:03 AM    GLUCOSEU NEG 09/21/2022 10:03 AM    KETUA NEG 09/21/2022 10:03 AM     Urine Cultures:   Lab Results   Component Value Date/Time    LABURIN No growth at 18 to 36 hours 09/29/2022 04:14 PM     Blood Cultures: No

## 2023-09-21 ENCOUNTER — APPOINTMENT (OUTPATIENT)
Dept: VASCULAR LAB | Age: 63
DRG: 164 | End: 2023-09-21
Payer: MEDICARE

## 2023-09-21 LAB
ANION GAP SERPL CALCULATED.3IONS-SCNC: 10 MMOL/L (ref 3–16)
ANTI-XA UNFRAC HEPARIN: 0.37 IU/ML (ref 0.3–0.7)
ANTI-XA UNFRAC HEPARIN: 0.48 IU/ML (ref 0.3–0.7)
ANTI-XA UNFRAC HEPARIN: 0.64 IU/ML (ref 0.3–0.7)
APTT BLD: 79.5 SEC (ref 22.7–35.9)
BASOPHILS # BLD: 0.1 K/UL (ref 0–0.2)
BASOPHILS NFR BLD: 0.6 %
BUN SERPL-MCNC: 18 MG/DL (ref 7–20)
CALCIUM SERPL-MCNC: 8.7 MG/DL (ref 8.3–10.6)
CHLORIDE SERPL-SCNC: 98 MMOL/L (ref 99–110)
CO2 SERPL-SCNC: 25 MMOL/L (ref 21–32)
CREAT SERPL-MCNC: 0.7 MG/DL (ref 0.8–1.3)
DEPRECATED RDW RBC AUTO: 13.4 % (ref 12.4–15.4)
EOSINOPHIL # BLD: 0.2 K/UL (ref 0–0.6)
EOSINOPHIL NFR BLD: 1.9 %
GFR SERPLBLD CREATININE-BSD FMLA CKD-EPI: >60 ML/MIN/{1.73_M2}
GLUCOSE BLD-MCNC: 106 MG/DL (ref 70–99)
GLUCOSE BLD-MCNC: 129 MG/DL (ref 70–99)
GLUCOSE BLD-MCNC: 94 MG/DL (ref 70–99)
GLUCOSE BLD-MCNC: 96 MG/DL (ref 70–99)
GLUCOSE SERPL-MCNC: 105 MG/DL (ref 70–99)
HCT VFR BLD AUTO: 42.8 % (ref 40.5–52.5)
HGB BLD-MCNC: 14.7 G/DL (ref 13.5–17.5)
INR PPP: 1.14 (ref 0.84–1.16)
LEFT VENTRICULAR EJECTION FRACTION, EXTERNAL: 65
LYMPHOCYTES # BLD: 2.7 K/UL (ref 1–5.1)
LYMPHOCYTES NFR BLD: 22.3 %
MAGNESIUM SERPL-MCNC: 2 MG/DL (ref 1.8–2.4)
MCH RBC QN AUTO: 30.8 PG (ref 26–34)
MCHC RBC AUTO-ENTMCNC: 34.3 G/DL (ref 31–36)
MCV RBC AUTO: 89.6 FL (ref 80–100)
MONOCYTES # BLD: 1 K/UL (ref 0–1.3)
MONOCYTES NFR BLD: 8.4 %
NEUTROPHILS # BLD: 8 K/UL (ref 1.7–7.7)
NEUTROPHILS NFR BLD: 66.8 %
PERFORMED ON: ABNORMAL
PERFORMED ON: ABNORMAL
PERFORMED ON: NORMAL
PERFORMED ON: NORMAL
PLATELET # BLD AUTO: 168 K/UL (ref 135–450)
PMV BLD AUTO: 8.3 FL (ref 5–10.5)
POTASSIUM SERPL-SCNC: 3.5 MMOL/L (ref 3.5–5.1)
POTASSIUM SERPL-SCNC: 3.6 MMOL/L (ref 3.5–5.1)
PROTHROMBIN TIME: 14.6 SEC (ref 11.5–14.8)
RBC # BLD AUTO: 4.77 M/UL (ref 4.2–5.9)
SODIUM SERPL-SCNC: 133 MMOL/L (ref 136–145)
TROPONIN, HIGH SENSITIVITY: 77 NG/L (ref 0–22)
WBC # BLD AUTO: 12 K/UL (ref 4–11)

## 2023-09-21 PROCEDURE — 93005 ELECTROCARDIOGRAM TRACING: CPT | Performed by: NURSE PRACTITIONER

## 2023-09-21 PROCEDURE — 2060000000 HC ICU INTERMEDIATE R&B

## 2023-09-21 PROCEDURE — 93970 EXTREMITY STUDY: CPT

## 2023-09-21 PROCEDURE — C8929 TTE W OR WO FOL WCON,DOPPLER: HCPCS

## 2023-09-21 PROCEDURE — 83735 ASSAY OF MAGNESIUM: CPT

## 2023-09-21 PROCEDURE — 80048 BASIC METABOLIC PNL TOTAL CA: CPT

## 2023-09-21 PROCEDURE — 6370000000 HC RX 637 (ALT 250 FOR IP): Performed by: STUDENT IN AN ORGANIZED HEALTH CARE EDUCATION/TRAINING PROGRAM

## 2023-09-21 PROCEDURE — 36592 COLLECT BLOOD FROM PICC: CPT

## 2023-09-21 PROCEDURE — 85025 COMPLETE CBC W/AUTO DIFF WBC: CPT

## 2023-09-21 PROCEDURE — 85520 HEPARIN ASSAY: CPT

## 2023-09-21 PROCEDURE — 84132 ASSAY OF SERUM POTASSIUM: CPT

## 2023-09-21 PROCEDURE — 99222 1ST HOSP IP/OBS MODERATE 55: CPT | Performed by: INTERNAL MEDICINE

## 2023-09-21 PROCEDURE — 2580000003 HC RX 258: Performed by: NURSE PRACTITIONER

## 2023-09-21 PROCEDURE — 85730 THROMBOPLASTIN TIME PARTIAL: CPT

## 2023-09-21 PROCEDURE — 36415 COLL VENOUS BLD VENIPUNCTURE: CPT

## 2023-09-21 PROCEDURE — 85610 PROTHROMBIN TIME: CPT

## 2023-09-21 PROCEDURE — 6360000004 HC RX CONTRAST MEDICATION: Performed by: STUDENT IN AN ORGANIZED HEALTH CARE EDUCATION/TRAINING PROGRAM

## 2023-09-21 PROCEDURE — 84484 ASSAY OF TROPONIN QUANT: CPT

## 2023-09-21 PROCEDURE — 2580000003 HC RX 258: Performed by: STUDENT IN AN ORGANIZED HEALTH CARE EDUCATION/TRAINING PROGRAM

## 2023-09-21 PROCEDURE — 6360000002 HC RX W HCPCS: Performed by: STUDENT IN AN ORGANIZED HEALTH CARE EDUCATION/TRAINING PROGRAM

## 2023-09-21 RX ADMIN — SODIUM CHLORIDE, PRESERVATIVE FREE 10 ML: 5 INJECTION INTRAVENOUS at 00:51

## 2023-09-21 RX ADMIN — SODIUM CHLORIDE: 9 INJECTION, SOLUTION INTRAVENOUS at 00:43

## 2023-09-21 RX ADMIN — LISINOPRIL 20 MG: 20 TABLET ORAL at 08:46

## 2023-09-21 RX ADMIN — HEPARIN SODIUM 16.04 UNITS/KG/HR: 10000 INJECTION, SOLUTION INTRAVENOUS at 21:46

## 2023-09-21 RX ADMIN — PERFLUTREN 1.5 ML: 6.52 INJECTION, SUSPENSION INTRAVENOUS at 10:00

## 2023-09-21 RX ADMIN — HYDROCHLOROTHIAZIDE 25 MG: 25 TABLET ORAL at 08:46

## 2023-09-21 RX ADMIN — HEPARIN SODIUM 16 UNITS/KG/HR: 10000 INJECTION, SOLUTION INTRAVENOUS at 09:03

## 2023-09-21 RX ADMIN — SODIUM CHLORIDE, PRESERVATIVE FREE 10 ML: 5 INJECTION INTRAVENOUS at 21:04

## 2023-09-21 RX ADMIN — SODIUM CHLORIDE, PRESERVATIVE FREE 10 ML: 5 INJECTION INTRAVENOUS at 08:47

## 2023-09-21 ASSESSMENT — PAIN SCALES - GENERAL: PAINLEVEL_OUTOF10: 0

## 2023-09-21 NOTE — CARE COORDINATION
Patient is from home with spouse and is independent at baseline. Patient's spouse is able to provide assistance at discharge if needed along with transportation. Lives on first floor of home. SW following.      Electronically signed by DRISS Upton on 9/21/2023 at 5:24 PM

## 2023-09-21 NOTE — PROGRESS NOTES
This RN messaged Lakhwinder Roper NP at [phone] about the patient's increase of troponin from 48 to 77. Patient was asymptomatic and no c/o chest pain, NRS on tele. NP ordered STAT EKG. EKG showered changes in T waves and ST segment. NP notified at [address] about EKG and ordered a cardiology consult for AM and said to continue on Heparin.

## 2023-09-21 NOTE — PROGRESS NOTES
This nurse called to bedside to remove left venous sheath from patient, sheath removed without incident and pressure held for 15 minutes with hemostasis completed. Educated bedside nurse on pulse checks and bed rest until checks were completed and what to do in case of re bleeding.

## 2023-09-21 NOTE — PLAN OF CARE
Problem: Safety - Adult  Goal: Free from fall injury  Outcome: Progressing  Flowsheets (Taken 9/21/2023 0539)  Free From Fall Injury: Instruct family/caregiver on patient safety  Note: Patient was on bedrest after his thrombectomy for a few hours. After the PICC was placed we did ortho vitals. Orthostatic vital signs obtained ee flowsheet for details. Pt does not meet criteria for orthostasis. Pt is a Low fall risk. See Anni Gathers Fall Score and ABCDS Injury Risk assessments. Pt bed is in low position, side rails up, call light and belongings are in reach. Fall risk light is on outside pts room. Pt encouraged to call for assistance as needed. Will continue with hourly rounds for PO intake, pain needs, toileting and repositioning as needed. Problem: Pain  Goal: Verbalizes/displays adequate comfort level or baseline comfort level  Outcome: Progressing  Flowsheets (Taken 9/21/2023 0539)  Verbalizes/displays adequate comfort level or baseline comfort level:   Encourage patient to monitor pain and request assistance   Assess pain using appropriate pain scale   Administer analgesics based on type and severity of pain and evaluate response   Implement non-pharmacological measures as appropriate and evaluate response   Consider cultural and social influences on pain and pain management   Notify Licensed Independent Practitioner if interventions unsuccessful or patient reports new pain  Note: Patient complained of back pain from his DDD d/t laying on his back after thrombectomy. Asked NP to put in pain med orders. See MAR. Once patient was able to move, he did not have any more c/o pain. Plan of care ongoing.       Problem: Respiratory - Adult  Goal: Achieves optimal ventilation and oxygenation  Outcome: Progressing  Flowsheets (Taken 9/21/2023 0539)  Achieves optimal ventilation and oxygenation:   Assess for changes in respiratory status   Assess for changes in mentation and behavior   Position to facilitate oxygenation and minimize respiratory effort   Oxygen supplementation based on oxygen saturation or arterial blood gases   Initiate smoking cessation protocol as indicated   Encourage broncho-pulmonary hygiene including cough, deep breathe, incentive spirometry   Assess the need for suctioning and aspirate as needed   Assess and instruct to report shortness of breath or any respiratory difficulty   Respiratory therapy support as indicated  Note: Patient has clear, but diminished breath sounds in all lobes post thrombectomy. Plan of care ongoing. Problem: Cardiovascular - Adult  Goal: Absence of cardiac dysrhythmias or at baseline  Outcome: Progressing  Flowsheets (Taken 9/21/2023 0539)  Absence of cardiac dysrhythmias or at baseline:   Monitor cardiac rate and rhythm   Assess for signs of decreased cardiac output   Administer antiarrhythmia medication and electrolyte replacement as ordered  Note: Patient is on continuous tele. He is NSR. His stat ECG showed ST and T wave differences from the previous ECG. NP notified and put in a consult to cardiology for the AM.      Problem: Skin/Tissue Integrity - Adult  Goal: Incisions, wounds, or drain sites healing without S/S of infection  Outcome: Progressing  Flowsheets (Taken 9/21/2023 0539)  Incisions, Wounds, or Drain Sites Healing Without Sign and Symptoms of Infection:   ADMISSION and DAILY: Assess and document risk factors for pressure ulcer development   TWICE DAILY: Assess and document skin integrity   TWICE DAILY: Assess and document dressing/incision, wound bed, drain sites and surrounding tissue   Implement wound care per orders   Initiate isolation precautions as appropriate   Initiate pressure ulcer prevention bundle as indicated  Note: Patient has bilateral fem incision sites. Both are not bleeding. R site was cleaned of dried blood. No pain at the sites. Plan of care ongoing.  PICC site had some bleeding which is normal for a new insertion + heparin

## 2023-09-21 NOTE — PROGRESS NOTES
This RN messaged Chantel Thompson NP at [phone] and resent the message at [phone]. NP was asked to place orders for PICC line or Midline and order a PRN pain med for the patient's back pain. PICC line orders placed and PRN orders placed.

## 2023-09-21 NOTE — PROGRESS NOTES
Arrived to place PICC line with bedside RN jazmyn. Pre-procedure and timeout done with RN, discussed limitations of placement and allergies. Pt's basilic, brachial, cephalic are all easily collapsible with no indication for a clot. Vein selected is large enough for catheter. Pt tolerated sterile procedure well, with no difficulty accessing right basilic vein, when accessed - blood was free flowing and non-pulsatile. Guidewire, introducer, and catheter went in smoothly. PICC line verified with 3CG technology with peaked P-waves (please see image below). PICC tip terminates in the SVC according to [employer] 3CG tip confirmation system. PICC was seen dropping into SVC with tip tracking technology and discernable peaked p waves were noted without negative deflection. OK to use PICC. Please use new IV tubing when connecting to the newly placed central line. Post procedure - reorganized pt table, placed pt in lowest position, with call light and educated on line care. Reported off to bedside RN. Please call if you have any questions about the PICC or ML. The  will direct you to the PICC RN that is on call.       [phone]

## 2023-09-21 NOTE — PROGRESS NOTES
This RN messaged Lula Shaw NP at [phone] asking if she still wanted the Heparin bolus that was due at 1800 to be given, now that the patient has IV/PICC access. NP did not want bolus and wanted heparin to be titrated per orders since the patient is at a therapeutic level now.

## 2023-09-21 NOTE — PLAN OF CARE
Problem: Safety - Adult  Goal: Free from fall injury  9/21/2023 1659 by Terra Goyal RN  Outcome: Progressing     Problem: Pain  Goal: Verbalizes/displays adequate comfort level or baseline comfort level  9/21/2023 1659 by Terra Goyal RN  Outcome: Progressing     Problem: Skin/Tissue Integrity - Adult  Goal: Skin integrity remains intact  Outcome: Progressing

## 2023-09-22 LAB
ANTI-XA UNFRAC HEPARIN: 0.49 IU/ML (ref 0.3–0.7)
ANTI-XA UNFRAC HEPARIN: 0.5 IU/ML (ref 0.3–0.7)
ANTI-XA UNFRAC HEPARIN: 0.8 IU/ML (ref 0.3–0.7)
BASOPHILS # BLD: 0.1 K/UL (ref 0–0.2)
BASOPHILS NFR BLD: 1.1 %
DEPRECATED RDW RBC AUTO: 13.4 % (ref 12.4–15.4)
EKG ATRIAL RATE: 64 BPM
EKG ATRIAL RATE: 67 BPM
EKG DIAGNOSIS: NORMAL
EKG DIAGNOSIS: NORMAL
EKG P AXIS: 54 DEGREES
EKG P AXIS: 57 DEGREES
EKG P-R INTERVAL: 170 MS
EKG P-R INTERVAL: 172 MS
EKG Q-T INTERVAL: 518 MS
EKG Q-T INTERVAL: 558 MS
EKG QRS DURATION: 78 MS
EKG QRS DURATION: 84 MS
EKG QTC CALCULATION (BAZETT): 534 MS
EKG QTC CALCULATION (BAZETT): 589 MS
EKG R AXIS: -17 DEGREES
EKG R AXIS: -30 DEGREES
EKG T AXIS: -57 DEGREES
EKG T AXIS: -62 DEGREES
EKG VENTRICULAR RATE: 64 BPM
EKG VENTRICULAR RATE: 67 BPM
EOSINOPHIL # BLD: 0.5 K/UL (ref 0–0.6)
EOSINOPHIL NFR BLD: 4.6 %
GLUCOSE BLD-MCNC: 108 MG/DL (ref 70–99)
GLUCOSE BLD-MCNC: 113 MG/DL (ref 70–99)
GLUCOSE BLD-MCNC: 117 MG/DL (ref 70–99)
GLUCOSE BLD-MCNC: 23 MG/DL (ref 70–99)
GLUCOSE BLD-MCNC: 97 MG/DL (ref 70–99)
HCT VFR BLD AUTO: 42.3 % (ref 40.5–52.5)
HGB BLD-MCNC: 14.5 G/DL (ref 13.5–17.5)
LYMPHOCYTES # BLD: 2.9 K/UL (ref 1–5.1)
LYMPHOCYTES NFR BLD: 28.5 %
MCH RBC QN AUTO: 31.4 PG (ref 26–34)
MCHC RBC AUTO-ENTMCNC: 34.3 G/DL (ref 31–36)
MCV RBC AUTO: 91.4 FL (ref 80–100)
MONOCYTES # BLD: 0.9 K/UL (ref 0–1.3)
MONOCYTES NFR BLD: 8.9 %
NEUTROPHILS # BLD: 5.8 K/UL (ref 1.7–7.7)
NEUTROPHILS NFR BLD: 56.9 %
PERFORMED ON: ABNORMAL
PERFORMED ON: NORMAL
PLATELET # BLD AUTO: 179 K/UL (ref 135–450)
PMV BLD AUTO: 7.9 FL (ref 5–10.5)
RBC # BLD AUTO: 4.63 M/UL (ref 4.2–5.9)
TROPONIN, HIGH SENSITIVITY: 41 NG/L (ref 0–22)
WBC # BLD AUTO: 10.1 K/UL (ref 4–11)

## 2023-09-22 PROCEDURE — 81240 F2 GENE: CPT

## 2023-09-22 PROCEDURE — 81241 F5 GENE: CPT

## 2023-09-22 PROCEDURE — 84484 ASSAY OF TROPONIN QUANT: CPT

## 2023-09-22 PROCEDURE — 86146 BETA-2 GLYCOPROTEIN ANTIBODY: CPT

## 2023-09-22 PROCEDURE — 81400 MOPATH PROCEDURE LEVEL 1: CPT

## 2023-09-22 PROCEDURE — 85520 HEPARIN ASSAY: CPT

## 2023-09-22 PROCEDURE — 85613 RUSSELL VIPER VENOM DILUTED: CPT

## 2023-09-22 PROCEDURE — 6360000002 HC RX W HCPCS: Performed by: STUDENT IN AN ORGANIZED HEALTH CARE EDUCATION/TRAINING PROGRAM

## 2023-09-22 PROCEDURE — 85598 HEXAGNAL PHOSPH PLTLT NEUTRL: CPT

## 2023-09-22 PROCEDURE — 85730 THROMBOPLASTIN TIME PARTIAL: CPT

## 2023-09-22 PROCEDURE — 86147 CARDIOLIPIN ANTIBODY EA IG: CPT

## 2023-09-22 PROCEDURE — 2060000000 HC ICU INTERMEDIATE R&B

## 2023-09-22 PROCEDURE — 99232 SBSQ HOSP IP/OBS MODERATE 35: CPT | Performed by: INTERNAL MEDICINE

## 2023-09-22 PROCEDURE — 93010 ELECTROCARDIOGRAM REPORT: CPT | Performed by: INTERNAL MEDICINE

## 2023-09-22 PROCEDURE — 85025 COMPLETE CBC W/AUTO DIFF WBC: CPT

## 2023-09-22 PROCEDURE — 2580000003 HC RX 258: Performed by: NURSE PRACTITIONER

## 2023-09-22 PROCEDURE — 2580000003 HC RX 258: Performed by: STUDENT IN AN ORGANIZED HEALTH CARE EDUCATION/TRAINING PROGRAM

## 2023-09-22 PROCEDURE — 93005 ELECTROCARDIOGRAM TRACING: CPT

## 2023-09-22 PROCEDURE — 6370000000 HC RX 637 (ALT 250 FOR IP): Performed by: STUDENT IN AN ORGANIZED HEALTH CARE EDUCATION/TRAINING PROGRAM

## 2023-09-22 PROCEDURE — 81291 MTHFR GENE: CPT

## 2023-09-22 RX ORDER — ROSUVASTATIN CALCIUM 10 MG/1
10 TABLET, COATED ORAL NIGHTLY
Status: DISCONTINUED | OUTPATIENT
Start: 2023-09-22 | End: 2023-09-23 | Stop reason: HOSPADM

## 2023-09-22 RX ORDER — PANTOPRAZOLE SODIUM 40 MG/1
40 TABLET, DELAYED RELEASE ORAL
Status: DISCONTINUED | OUTPATIENT
Start: 2023-09-23 | End: 2023-09-23 | Stop reason: HOSPADM

## 2023-09-22 RX ORDER — METFORMIN HYDROCHLORIDE 500 MG/1
500 TABLET, EXTENDED RELEASE ORAL
Status: DISCONTINUED | OUTPATIENT
Start: 2023-09-23 | End: 2023-09-23 | Stop reason: HOSPADM

## 2023-09-22 RX ADMIN — SODIUM CHLORIDE, PRESERVATIVE FREE 10 ML: 5 INJECTION INTRAVENOUS at 23:44

## 2023-09-22 RX ADMIN — SODIUM CHLORIDE, PRESERVATIVE FREE 10 ML: 5 INJECTION INTRAVENOUS at 08:20

## 2023-09-22 RX ADMIN — LISINOPRIL 20 MG: 20 TABLET ORAL at 08:19

## 2023-09-22 RX ADMIN — HYDROCHLOROTHIAZIDE 25 MG: 25 TABLET ORAL at 08:19

## 2023-09-22 RX ADMIN — SODIUM CHLORIDE, PRESERVATIVE FREE 10 ML: 5 INJECTION INTRAVENOUS at 08:19

## 2023-09-22 RX ADMIN — HEPARIN SODIUM 15.04 UNITS/KG/HR: 10000 INJECTION, SOLUTION INTRAVENOUS at 11:25

## 2023-09-22 ASSESSMENT — PAIN SCALES - GENERAL
PAINLEVEL_OUTOF10: 0

## 2023-09-22 NOTE — PROGRESS NOTES
Physician Progress Note      Althea Hutton  CSN #:                  [id number]  :                       1960  ADMIT DATE:       2023 12:38 PM  [facility] DATE:  RESPONDING  PROVIDER #:        Elena Lopez MD          QUERY TEXT:    Pt admitted with pulmonary embolism and underwent mechanical thrombectomy by   IR. Noted documented of right heart strain throughout the chart. If   possible, please document in the progress notes and discharge summary if you   are evaluating and / or treating any of the following: The medical record reflects the following:  Risk Factors: 70 y.o. male with PMH HTN, DM2, Obesity, GERD, HOARCIO, Bilateral PE   on imaging. Clinical Indicators: Pt has bilateral PE with right heart strain. Presented   with cough, chest pain and SOB  Treatment: Cardiology consult, Imaging, Mechanical thrombectomy by IR  Options provided:  -- Pulmonary Embolism with Acute Cor Pulmonale  -- Pulmonary Embolism without Acute Cor Pulmonale  -- Other - I will add my own diagnosis  -- Disagree - Not applicable / Not valid  -- Disagree - Clinically unable to determine / Unknown  -- Refer to Clinical Documentation Reviewer    PROVIDER RESPONSE TEXT:    This patient has Pulmonary Embolism with acute cor pulmonale.     Query created by: Debby Cintron on 2023 3:50 PM      Electronically signed by:  Elena Lopez MD 2023 12:44 PM

## 2023-09-22 NOTE — PLAN OF CARE
Problem: Safety - Adult  Goal: Free from fall injury  9/22/2023 0654 by Jonathan Covarrubias RN  Outcome: Progressing  Flowsheets (Taken 9/22/2023 0654)  Free From Fall Injury: Instruct family/caregiver on patient safety  9/21/2023 1659 by Ashish Zhong RN  Outcome: Progressing     Problem: Pain  Goal: Verbalizes/displays adequate comfort level or baseline comfort level  9/22/2023 0654 by Jonathan Covarrubias RN  Outcome: Progressing  Flowsheets (Taken 9/22/2023 0654)  Verbalizes/displays adequate comfort level or baseline comfort level:   Encourage patient to monitor pain and request assistance   Assess pain using appropriate pain scale  9/21/2023 1659 by Ashish Zhong RN  Outcome: Progressing     Problem: Respiratory - Adult  Goal: Achieves optimal ventilation and oxygenation  Outcome: Progressing  Flowsheets (Taken 9/22/2023 0654)  Achieves optimal ventilation and oxygenation:   Assess for changes in respiratory status   Assess for changes in mentation and behavior   Position to facilitate oxygenation and minimize respiratory effort

## 2023-09-22 NOTE — PLAN OF CARE
Problem: Safety - Adult  Goal: Free from fall injury  9/22/2023 1012 by Raiza Parekh RN  Outcome: Progressing  Free From Fall Injury: Instruct family/caregiver on patient safety     Problem: Pain  Goal: Verbalizes/displays adequate comfort level or baseline comfort level  9/22/2023 1012 by Raiza Parekh RN  Outcome: Progressing  Verbalizes/displays adequate comfort level or baseline comfort level:   Encourage patient to monitor pain and request assistance   Assess pain using appropriate pain scale     Problem: Respiratory - Adult  Goal: Achieves optimal ventilation and oxygenation  9/22/2023 1012 by Raiza Parekh RN  Outcome: Progressing  Achieves optimal ventilation and oxygenation:   Assess for changes in respiratory status   Assess for changes in mentation and behavior   Position to facilitate oxygenation and minimize respiratory effort     Problem: Skin/Tissue Integrity - Adult  Goal: Skin integrity remains intact  Outcome: Progressing     Problem: Hematologic - Adult  Goal: Maintains hematologic stability  Outcome: Progressing  Patient's hemoglobin this AM:   Recent Labs     09/22/23  0438   HGB 14.5     Patient's platelet count this AM:   Recent Labs     09/22/23  0438       Thrombocytopenia not present at this time. Patient showing no signs or symptoms of active bleeding. Transfusion not indicated at this time. Patient verbalizes understanding of all instructions. Will continue to assess and implement POC. Call light within reach and hourly rounding in place.

## 2023-09-23 VITALS
WEIGHT: 280.4 LBS | HEIGHT: 69 IN | DIASTOLIC BLOOD PRESSURE: 69 MMHG | HEART RATE: 57 BPM | SYSTOLIC BLOOD PRESSURE: 136 MMHG | OXYGEN SATURATION: 96 % | RESPIRATION RATE: 18 BRPM | TEMPERATURE: 97.5 F | BODY MASS INDEX: 41.53 KG/M2

## 2023-09-23 LAB
ALBUMIN SERPL-MCNC: 3.6 G/DL (ref 3.4–5)
ANION GAP SERPL CALCULATED.3IONS-SCNC: 11 MMOL/L (ref 3–16)
ANTI-XA UNFRAC HEPARIN: 0.57 IU/ML (ref 0.3–0.7)
BASOPHILS # BLD: 0.1 K/UL (ref 0–0.2)
BASOPHILS NFR BLD: 1 %
BUN SERPL-MCNC: 19 MG/DL (ref 7–20)
CALCIUM SERPL-MCNC: 8.6 MG/DL (ref 8.3–10.6)
CHLORIDE SERPL-SCNC: 99 MMOL/L (ref 99–110)
CO2 SERPL-SCNC: 25 MMOL/L (ref 21–32)
CREAT SERPL-MCNC: 0.9 MG/DL (ref 0.8–1.3)
DEPRECATED RDW RBC AUTO: 13.1 % (ref 12.4–15.4)
EKG ATRIAL RATE: 60 BPM
EKG DIAGNOSIS: NORMAL
EKG P AXIS: 52 DEGREES
EKG P-R INTERVAL: 176 MS
EKG Q-T INTERVAL: 494 MS
EKG QRS DURATION: 86 MS
EKG QTC CALCULATION (BAZETT): 494 MS
EKG R AXIS: -16 DEGREES
EKG T AXIS: -34 DEGREES
EKG VENTRICULAR RATE: 60 BPM
EOSINOPHIL # BLD: 0.4 K/UL (ref 0–0.6)
EOSINOPHIL NFR BLD: 4.2 %
GFR SERPLBLD CREATININE-BSD FMLA CKD-EPI: >60 ML/MIN/{1.73_M2}
GLUCOSE BLD-MCNC: 106 MG/DL (ref 70–99)
GLUCOSE SERPL-MCNC: 117 MG/DL (ref 70–99)
HCT VFR BLD AUTO: 40.7 % (ref 40.5–52.5)
HGB BLD-MCNC: 14 G/DL (ref 13.5–17.5)
LYMPHOCYTES # BLD: 3 K/UL (ref 1–5.1)
LYMPHOCYTES NFR BLD: 30.6 %
MCH RBC QN AUTO: 30.8 PG (ref 26–34)
MCHC RBC AUTO-ENTMCNC: 34.3 G/DL (ref 31–36)
MCV RBC AUTO: 89.7 FL (ref 80–100)
MONOCYTES # BLD: 0.7 K/UL (ref 0–1.3)
MONOCYTES NFR BLD: 7.4 %
NEUTROPHILS # BLD: 5.6 K/UL (ref 1.7–7.7)
NEUTROPHILS NFR BLD: 56.8 %
PERFORMED ON: ABNORMAL
PHOSPHATE SERPL-MCNC: 4.2 MG/DL (ref 2.5–4.9)
PLATELET # BLD AUTO: 184 K/UL (ref 135–450)
PMV BLD AUTO: 8.3 FL (ref 5–10.5)
POTASSIUM SERPL-SCNC: 3.7 MMOL/L (ref 3.5–5.1)
RBC # BLD AUTO: 4.54 M/UL (ref 4.2–5.9)
SODIUM SERPL-SCNC: 135 MMOL/L (ref 136–145)
TROPONIN, HIGH SENSITIVITY: 36 NG/L (ref 0–22)
WBC # BLD AUTO: 9.8 K/UL (ref 4–11)

## 2023-09-23 PROCEDURE — 85520 HEPARIN ASSAY: CPT

## 2023-09-23 PROCEDURE — 84484 ASSAY OF TROPONIN QUANT: CPT

## 2023-09-23 PROCEDURE — 6370000000 HC RX 637 (ALT 250 FOR IP): Performed by: INTERNAL MEDICINE

## 2023-09-23 PROCEDURE — 36592 COLLECT BLOOD FROM PICC: CPT

## 2023-09-23 PROCEDURE — 93005 ELECTROCARDIOGRAM TRACING: CPT | Performed by: INTERNAL MEDICINE

## 2023-09-23 PROCEDURE — 6370000000 HC RX 637 (ALT 250 FOR IP): Performed by: HOSPITALIST

## 2023-09-23 PROCEDURE — 6360000002 HC RX W HCPCS: Performed by: STUDENT IN AN ORGANIZED HEALTH CARE EDUCATION/TRAINING PROGRAM

## 2023-09-23 PROCEDURE — 2580000003 HC RX 258: Performed by: STUDENT IN AN ORGANIZED HEALTH CARE EDUCATION/TRAINING PROGRAM

## 2023-09-23 PROCEDURE — 93010 ELECTROCARDIOGRAM REPORT: CPT | Performed by: INTERNAL MEDICINE

## 2023-09-23 PROCEDURE — 6370000000 HC RX 637 (ALT 250 FOR IP): Performed by: STUDENT IN AN ORGANIZED HEALTH CARE EDUCATION/TRAINING PROGRAM

## 2023-09-23 PROCEDURE — 80069 RENAL FUNCTION PANEL: CPT

## 2023-09-23 PROCEDURE — 99232 SBSQ HOSP IP/OBS MODERATE 35: CPT | Performed by: INTERNAL MEDICINE

## 2023-09-23 PROCEDURE — 85025 COMPLETE CBC W/AUTO DIFF WBC: CPT

## 2023-09-23 RX ORDER — HYDROCHLOROTHIAZIDE 25 MG/1
25 TABLET ORAL DAILY
Qty: 30 TABLET | Refills: 3 | Status: SHIPPED | OUTPATIENT
Start: 2023-09-24

## 2023-09-23 RX ADMIN — SODIUM CHLORIDE, PRESERVATIVE FREE 10 ML: 5 INJECTION INTRAVENOUS at 08:31

## 2023-09-23 RX ADMIN — APIXABAN 10 MG: 5 TABLET, FILM COATED ORAL at 14:22

## 2023-09-23 RX ADMIN — PANTOPRAZOLE SODIUM 40 MG: 40 TABLET, DELAYED RELEASE ORAL at 06:21

## 2023-09-23 RX ADMIN — LISINOPRIL 20 MG: 20 TABLET ORAL at 08:28

## 2023-09-23 RX ADMIN — HYDROCHLOROTHIAZIDE 25 MG: 25 TABLET ORAL at 08:28

## 2023-09-23 RX ADMIN — HEPARIN SODIUM 15 UNITS/KG/HR: 10000 INJECTION, SOLUTION INTRAVENOUS at 00:48

## 2023-09-23 NOTE — PLAN OF CARE
Problem: Safety - Adult  Goal: Free from fall injury  Outcome: Progressing  Note: Orthostatic vital signs obtained at start of shift - see flowsheet for details. Pt does not meet criteria for orthostasis. Pt is a Med fall risk. See Yara Morrisura Fall Score and ABCDS Injury Risk assessments. - Screening for Orthostasis AND not a [place] Risk per PHELPS/ABCDS: Pt bed is in low position, side rails up, call light and belongings are in reach. Fall risk light is on outside pts room. Pt encouraged to call for assistance as needed. Will continue with hourly rounds for PO intake, pain needs, toileting and repositioning as needed. Problem: ABCDS Injury Assessment  Goal: Absence of physical injury  Outcome: Progressing  Flowsheets (Taken 9/23/2023 0524)  Absence of Physical Injury: Implement safety measures based on patient assessment     Problem: Cardiovascular - Adult  Goal: Absence of cardiac dysrhythmias or at baseline  Outcome: Progressing  Flowsheets (Taken 9/23/2023 0524)  Absence of cardiac dysrhythmias or at baseline: Monitor cardiac rate and rhythm  Note: Patient has continuous telemetry in place.

## 2023-09-23 NOTE — PROGRESS NOTES
Discharge teaching and instructions for diagnosis/procedure of DVT/PE completed with patient using teachback method. AVS reviewed. Prescriptions given to patient sent to his pharmacy. Patient voiced understanding regarding prescriptions, follow up appointments, and care of self at home. Instructed patient to follow up with his PCP within one week. D/C PICC Line according to [facility]. Patient laid flay for 10 minutes after removal. No bleeding after removal. Instruct patient to keep bandage on. Discharged ambulatory to  home with support per family.

## 2023-09-24 NOTE — DISCHARGE SUMMARY
CALCIUM 8.6 09/23/2023 03:22 AM    PHOS 4.2 09/23/2023 03:22 AM         Significant Diagnostic Studies    Radiology:   VL Extremity Venous Bilateral   Final Result      [facility]   Final Result   1. Successful pulmonary thrombectomy as described above. CT CHEST PULMONARY EMBOLISM W CONTRAST   Final Result      1. Evidence of large volume acute bilateral pulmonary embolus with elevated RV   LV ratio. Critical results reported to Dr. Dillon Guthrie and  interventional radiology at   9/20/2023 1:55 p.m. XR CHEST (2 VW)   Final Result   1. No acute cardiopulmonary findings. Consults:     IP CONSULT TO INTERVENTIONAL RADIOLOGY  IP CONSULT TO CRITICAL CARE  IP CONSULT TO CARDIOLOGY  IP CONSULT TO PHARMACY    Disposition: Home     Condition at Discharge: Stable    Discharge Instructions/Follow-up:  w/ PCP 1-2 weeks and subspecialists as arranged. Code Status:  Full Code    Activity: activity as tolerated    Diet: diabetic diet      Discharge Medications:     Discharge Medication List as of 9/23/2023  2:47 PM             Details   !! apixaban (ELIQUIS) 5 MG TABS tablet Take 1 tablet by mouth 2 times daily, Disp-60 tablet, R-3Normal      hydroCHLOROthiazide (HYDRODIURIL) 25 MG tablet Take 1 tablet by mouth daily, Disp-30 tablet, R-3Normal      !! apixaban (ELIQUIS) 5 MG TABS tablet Take 2 tablets by mouth 2 times daily for 13 doses, Disp-26 tablet, R-0Normal       !! - Potential duplicate medications found. Please discuss with provider.              Details   metFORMIN (GLUCOPHAGE-XR) 500 MG extended release tablet Take 1 tablet by mouth daily (with breakfast), Disp-90 tablet, R-1Normal      lisinopril-hydroCHLOROthiazide (PRINZIDE;ZESTORETIC) 20-25 MG per tablet TAKE 1 TABLET BY MOUTH EVERY DAY, Disp-90 tablet, R-1Normal      omeprazole (PRILOSEC) 20 MG delayed release capsule TAKE 1 CAPSULE BY MOUTH EVERY DAY, Disp-30 capsule, R-5Normal      rosuvastatin (CRESTOR) 10 MG tablet

## 2023-09-25 ENCOUNTER — CARE COORDINATION (OUTPATIENT)
Dept: CASE MANAGEMENT | Age: 63
End: 2023-09-25

## 2023-09-25 ENCOUNTER — TELEPHONE (OUTPATIENT)
Dept: FAMILY MEDICINE CLINIC | Age: 63
End: 2023-09-25

## 2023-09-25 NOTE — CARE COORDINATION
[address] [facility],[address] Floor Transitions Initial Follow Up Call    Call within 2 business days of discharge: Yes    Patient:  Jaden Arceo  Patient :  1960  MRN:  [id number]   Reason for Admission:  bilateral PE  Discharge Date:  23  RARS: 5      Transitions of Care Initial Call    Was this an external facility discharge? no    Discharge Facility: [facility]      Challenges to be reviewed by the provider   Additional needs identified to be addressed with provider:    yes - hfu needed           1ST  CTC attempt to reach Pt regarding recent hospital discharge. CTC left voice recording with call back number requesting a call back. Follow up appointments:    No future appointments. DAMION Cuevas, RN  Care Transition Coordinator  Contact Number:  [phone]

## 2023-09-25 NOTE — TELEPHONE ENCOUNTER
Please call patient to schedule a hospital f/u. Patient wants to be worked into schedule asap.  No availability ubtil 10/16

## 2023-09-26 ENCOUNTER — CARE COORDINATION (OUTPATIENT)
Dept: CASE MANAGEMENT | Age: 63
End: 2023-09-26

## 2023-09-26 NOTE — CARE COORDINATION
[address] [facility],[address] Transitions Initial Follow Up Call    Call within 2 business days of discharge: Yes    Patient:  Onel Weems  Patient :  1960  MRN:  [id number]   Reason for Admission:  bilateral PE  Discharge Date:  23  RARS: 5      Transitions of Care Initial Call    Was this an external facility discharge? no    Discharge Facility: [facility]      Challenges to be reviewed by the provider   Additional needs identified to be addressed with provider:    no    AMB CC Provider Discharge Needs: none            2ND  CTC attempt to reach Pt regarding recent hospital discharge. CTC left voice recording with call back number requesting a call back. CT episode closed / unable to reach. HFU with PCP . Follow up appointments:    Future Appointments   Date Time Provider [address]   2023 12:20 PM MD KARRIE Green DAMION Hayden, RN  Care Transition Coordinator  Contact Number:  [phone]

## 2023-09-27 ENCOUNTER — OFFICE VISIT (OUTPATIENT)
Dept: FAMILY MEDICINE CLINIC | Age: 63
End: 2023-09-27
Payer: COMMERCIAL

## 2023-09-27 VITALS
TEMPERATURE: 98.3 F | SYSTOLIC BLOOD PRESSURE: 124 MMHG | OXYGEN SATURATION: 93 % | DIASTOLIC BLOOD PRESSURE: 60 MMHG | BODY MASS INDEX: 42.15 KG/M2 | HEART RATE: 80 BPM | RESPIRATION RATE: 18 BRPM | WEIGHT: 285.4 LBS

## 2023-09-27 DIAGNOSIS — R06.02 SHORTNESS OF BREATH: Primary | ICD-10-CM

## 2023-09-27 DIAGNOSIS — I82.4Y1 ACUTE DEEP VEIN THROMBOSIS (DVT) OF PROXIMAL VEIN OF RIGHT LOWER EXTREMITY (HCC): ICD-10-CM

## 2023-09-27 DIAGNOSIS — I26.02 ACUTE SADDLE PULMONARY EMBOLISM WITH ACUTE COR PULMONALE (HCC): ICD-10-CM

## 2023-09-27 PROCEDURE — 1123F ACP DISCUSS/DSCN MKR DOCD: CPT | Performed by: FAMILY MEDICINE

## 2023-09-27 PROCEDURE — 3078F DIAST BP <80 MM HG: CPT | Performed by: FAMILY MEDICINE

## 2023-09-27 PROCEDURE — 3074F SYST BP LT 130 MM HG: CPT | Performed by: FAMILY MEDICINE

## 2023-09-27 PROCEDURE — 99214 OFFICE O/P EST MOD 30 MIN: CPT | Performed by: FAMILY MEDICINE

## 2023-12-09 RX ORDER — ROSUVASTATIN CALCIUM 10 MG/1
10 TABLET, COATED ORAL NIGHTLY
Qty: 30 TABLET | Refills: 5 | Status: SHIPPED | OUTPATIENT
Start: 2023-12-09

## 2024-01-15 RX ORDER — APIXABAN 5 MG/1
5 TABLET, FILM COATED ORAL 2 TIMES DAILY
Qty: 60 TABLET | Refills: 3 | Status: SHIPPED | OUTPATIENT
Start: 2024-01-15

## 2024-01-15 NOTE — TELEPHONE ENCOUNTER
Requested Prescriptions     Pending Prescriptions Disp Refills    ELIQUIS 5 MG TABS tablet [Pharmacy Med Name: ELIQUIS 5MG TABLETS] 60 tablet 3     Sig: TAKE 1 TABLET BY MOUTH TWICE DAILY          Last Office Visit: 9/27/2023     Next Office Visit: Visit date not found

## 2024-02-09 RX ORDER — METFORMIN HYDROCHLORIDE 500 MG/1
500 TABLET, EXTENDED RELEASE ORAL DAILY
Qty: 90 TABLET | Refills: 1 | Status: SHIPPED | OUTPATIENT
Start: 2024-02-09

## 2024-02-09 NOTE — TELEPHONE ENCOUNTER
Requested Prescriptions     Pending Prescriptions Disp Refills    metFORMIN (GLUCOPHAGE-XR) 500 MG extended release tablet [Pharmacy Med Name: METFORMIN ER 500MG 24HR TABS] 90 tablet 1     Sig: TAKE 1 TABLET BY MOUTH DAILY WITH BREAKFAST          Last Office Visit: 9/27/2023     Next Office Visit: Visit date not found

## 2024-04-01 ENCOUNTER — OFFICE VISIT (OUTPATIENT)
Dept: FAMILY MEDICINE CLINIC | Age: 64
End: 2024-04-01
Payer: MEDICARE

## 2024-04-01 VITALS
OXYGEN SATURATION: 95 % | RESPIRATION RATE: 16 BRPM | BODY MASS INDEX: 42.47 KG/M2 | DIASTOLIC BLOOD PRESSURE: 70 MMHG | SYSTOLIC BLOOD PRESSURE: 132 MMHG | TEMPERATURE: 97.9 F | HEART RATE: 56 BPM | WEIGHT: 287.6 LBS

## 2024-04-01 DIAGNOSIS — Z12.5 SCREENING FOR PROSTATE CANCER: ICD-10-CM

## 2024-04-01 DIAGNOSIS — J06.9 ACUTE URI: Primary | ICD-10-CM

## 2024-04-01 DIAGNOSIS — R09.89 CHEST CONGESTION: ICD-10-CM

## 2024-04-01 DIAGNOSIS — I26.99 PULMONARY EMBOLISM, BILATERAL (HCC): ICD-10-CM

## 2024-04-01 DIAGNOSIS — I10 ESSENTIAL HYPERTENSION, BENIGN: Chronic | ICD-10-CM

## 2024-04-01 DIAGNOSIS — I50.22 CHRONIC SYSTOLIC (CONGESTIVE) HEART FAILURE (HCC): ICD-10-CM

## 2024-04-01 DIAGNOSIS — I26.02 ACUTE SADDLE PULMONARY EMBOLISM WITH ACUTE COR PULMONALE (HCC): ICD-10-CM

## 2024-04-01 DIAGNOSIS — Z12.11 SCREEN FOR COLON CANCER: ICD-10-CM

## 2024-04-01 DIAGNOSIS — E66.01 MORBID OBESITY (HCC): ICD-10-CM

## 2024-04-01 DIAGNOSIS — R73.9 HYPERGLYCEMIA: ICD-10-CM

## 2024-04-01 DIAGNOSIS — H66.003 NON-RECURRENT ACUTE SUPPURATIVE OTITIS MEDIA OF BOTH EARS WITHOUT SPONTANEOUS RUPTURE OF TYMPANIC MEMBRANES: ICD-10-CM

## 2024-04-01 PROCEDURE — 99214 OFFICE O/P EST MOD 30 MIN: CPT | Performed by: FAMILY MEDICINE

## 2024-04-01 PROCEDURE — 3075F SYST BP GE 130 - 139MM HG: CPT | Performed by: FAMILY MEDICINE

## 2024-04-01 PROCEDURE — 3078F DIAST BP <80 MM HG: CPT | Performed by: FAMILY MEDICINE

## 2024-04-01 PROCEDURE — 1123F ACP DISCUSS/DSCN MKR DOCD: CPT | Performed by: FAMILY MEDICINE

## 2024-04-01 RX ORDER — CLARITHROMYCIN 500 MG/1
500 TABLET, COATED ORAL 2 TIMES DAILY
Qty: 20 TABLET | Refills: 0 | Status: SHIPPED | OUTPATIENT
Start: 2024-04-01 | End: 2024-04-11

## 2024-04-01 RX ORDER — LISINOPRIL AND HYDROCHLOROTHIAZIDE 25; 20 MG/1; MG/1
1 TABLET ORAL DAILY
Qty: 90 TABLET | Refills: 1 | Status: SHIPPED | OUTPATIENT
Start: 2024-04-01

## 2024-04-01 RX ORDER — HYDROCHLOROTHIAZIDE 25 MG/1
25 TABLET ORAL DAILY
Qty: 90 TABLET | Refills: 3 | Status: SHIPPED | OUTPATIENT
Start: 2024-04-01

## 2024-04-01 RX ORDER — OMEPRAZOLE 20 MG/1
CAPSULE, DELAYED RELEASE ORAL
Qty: 90 CAPSULE | Refills: 3 | Status: SHIPPED | OUTPATIENT
Start: 2024-04-01

## 2024-04-01 RX ORDER — HYDROCHLOROTHIAZIDE 25 MG/1
25 TABLET ORAL DAILY
Qty: 30 TABLET | Refills: 3 | Status: SHIPPED | OUTPATIENT
Start: 2024-04-01 | End: 2024-04-01

## 2024-04-01 RX ORDER — OMEPRAZOLE 20 MG/1
CAPSULE, DELAYED RELEASE ORAL
Qty: 30 CAPSULE | Refills: 5 | Status: SHIPPED | OUTPATIENT
Start: 2024-04-01 | End: 2024-04-01

## 2024-04-01 ASSESSMENT — PATIENT HEALTH QUESTIONNAIRE - PHQ9
SUM OF ALL RESPONSES TO PHQ9 QUESTIONS 1 & 2: 2
SUM OF ALL RESPONSES TO PHQ QUESTIONS 1-9: 2
SUM OF ALL RESPONSES TO PHQ QUESTIONS 1-9: 2
1. LITTLE INTEREST OR PLEASURE IN DOING THINGS: SEVERAL DAYS
SUM OF ALL RESPONSES TO PHQ QUESTIONS 1-9: 2
2. FEELING DOWN, DEPRESSED OR HOPELESS: SEVERAL DAYS
SUM OF ALL RESPONSES TO PHQ QUESTIONS 1-9: 2
SUM OF ALL RESPONSES TO PHQ9 QUESTIONS 1 & 2: 2
2. FEELING DOWN, DEPRESSED OR HOPELESS: SEVERAL DAYS
1. LITTLE INTEREST OR PLEASURE IN DOING THINGS: SEVERAL DAYS

## 2024-04-01 NOTE — TELEPHONE ENCOUNTER
04/01/2024 LOV - Seen today  No scheduled FOV    Requested Prescriptions     Pending Prescriptions Disp Refills    hydroCHLOROthiazide (HYDRODIURIL) 25 MG tablet [Pharmacy Med Name: HYDROCHLOROTHIAZIDE 25MG TABLETS] 90 tablet      Sig: TAKE 1 TABLET BY MOUTH DAILY    omeprazole (PRILOSEC) 20 MG delayed release capsule [Pharmacy Med Name: OMEPRAZOLE 20MG CAPSULES] 90 capsule      Sig: TAKE 1 CAPSULE BY MOUTH EVERY DAY

## 2024-04-01 NOTE — PROGRESS NOTES
Here for eval of URI sx, present for about 6-7d ago with sx in chest, and chest congestion, upper chest phlegm that was just lingering in upper chest.  Mild to moderate cough, but not overly productive.  Has been more productive lately and now with some fullness in ears.  L > R.  No fever, chills.  Pt is not taking anything except some over the counter dayquil.  No issues of shortness of breath at rest, minimal shortness of breath with exertion.  No exposure at home, but has been out and about.      Pt did get a dx of pulm embolism 9/2023, s/p thrombectomy and treatment with NOAC therapy.  Pt did have hypercoag panel drawn but never was resulted.  Pt remains on NOAC therapy and at this time, is comfortable continuing.  Currently eliquis is $20 but will go up when he its donut hole.    Pt here for follow up of blood pressure.  Pt states doing great with adherence to therapy and feels well.  No issues of chest pain, shortness of breath.  No vision changes, headache, swelling in legs.       Except as noted above in the history of present illness, the review of systems is  negative for headache, vision changes, chest pain, shortness of breath, abdominal pain, urinary sx, bowel changes.    Past medical, surgical, and social history reviewed and updated  Medications and allergies reviewed and updated      O: /70   Pulse 56   Temp 97.9 °F (36.6 °C) (Temporal)   Resp 16   Wt 130.5 kg (287 lb 9.6 oz)   SpO2 95%   BMI 42.47 kg/m²   GEN: No acute distress, cooperative, well nourished, alert.   HEENT: PEERLA, EOMI , normocephalic/atraumatic, nares and oropharynx clear.  Mucous membranes normal, Tympanic membranes   Neck: soft, supple, no thyromegaly, mass, no Lymphadenopathy  CV: Regular rate and rhythm, no murmur, rubs, gallops. No edema.  Resp: Clear to auscultation bilaterally good air entry bilaterally  No crackles, wheeze. Breathing comfortably.  Mild coarse breath sounds, clear with cough  Psych: mood stable,

## 2024-04-19 DIAGNOSIS — E66.01 MORBID OBESITY (HCC): ICD-10-CM

## 2024-04-19 DIAGNOSIS — R73.9 HYPERGLYCEMIA: ICD-10-CM

## 2024-04-19 DIAGNOSIS — Z12.5 SCREENING FOR PROSTATE CANCER: ICD-10-CM

## 2024-04-19 DIAGNOSIS — I50.22 CHRONIC SYSTOLIC (CONGESTIVE) HEART FAILURE (HCC): ICD-10-CM

## 2024-04-19 LAB
ALBUMIN SERPL-MCNC: 4.3 G/DL (ref 3.4–5)
ALBUMIN/GLOB SERPL: 2.2 {RATIO} (ref 1.1–2.2)
ALP SERPL-CCNC: 56 U/L (ref 40–129)
ALT SERPL-CCNC: 22 U/L (ref 10–40)
ANION GAP SERPL CALCULATED.3IONS-SCNC: 13 MMOL/L (ref 3–16)
AST SERPL-CCNC: 20 U/L (ref 15–37)
BASOPHILS # BLD: 0.1 K/UL (ref 0–0.2)
BASOPHILS NFR BLD: 1.1 %
BILIRUB SERPL-MCNC: 0.5 MG/DL (ref 0–1)
BUN SERPL-MCNC: 19 MG/DL (ref 7–20)
CALCIUM SERPL-MCNC: 8.9 MG/DL (ref 8.3–10.6)
CHLORIDE SERPL-SCNC: 104 MMOL/L (ref 99–110)
CHOLEST SERPL-MCNC: 115 MG/DL (ref 0–199)
CO2 SERPL-SCNC: 24 MMOL/L (ref 21–32)
CREAT SERPL-MCNC: 0.8 MG/DL (ref 0.8–1.3)
DEPRECATED RDW RBC AUTO: 13.3 % (ref 12.4–15.4)
EOSINOPHIL # BLD: 0.1 K/UL (ref 0–0.6)
EOSINOPHIL NFR BLD: 0.7 %
GFR SERPLBLD CREATININE-BSD FMLA CKD-EPI: >90 ML/MIN/{1.73_M2}
GLUCOSE SERPL-MCNC: 85 MG/DL (ref 70–99)
HCT VFR BLD AUTO: 44.3 % (ref 40.5–52.5)
HDLC SERPL-MCNC: 45 MG/DL (ref 40–60)
HGB BLD-MCNC: 15 G/DL (ref 13.5–17.5)
LDLC SERPL CALC-MCNC: 54 MG/DL
LYMPHOCYTES # BLD: 2.5 K/UL (ref 1–5.1)
LYMPHOCYTES NFR BLD: 24.8 %
MCH RBC QN AUTO: 31 PG (ref 26–34)
MCHC RBC AUTO-ENTMCNC: 34 G/DL (ref 31–36)
MCV RBC AUTO: 91.3 FL (ref 80–100)
MONOCYTES # BLD: 0.8 K/UL (ref 0–1.3)
MONOCYTES NFR BLD: 7.6 %
NEUTROPHILS # BLD: 6.6 K/UL (ref 1.7–7.7)
NEUTROPHILS NFR BLD: 65.8 %
NT-PROBNP SERPL-MCNC: 144 PG/ML (ref 0–124)
PLATELET # BLD AUTO: 229 K/UL (ref 135–450)
PMV BLD AUTO: 8.2 FL (ref 5–10.5)
POTASSIUM SERPL-SCNC: 4 MMOL/L (ref 3.5–5.1)
PROT SERPL-MCNC: 6.3 G/DL (ref 6.4–8.2)
PSA SERPL DL<=0.01 NG/ML-MCNC: 0.87 NG/ML (ref 0–4)
RBC # BLD AUTO: 4.85 M/UL (ref 4.2–5.9)
SODIUM SERPL-SCNC: 141 MMOL/L (ref 136–145)
TRIGL SERPL-MCNC: 80 MG/DL (ref 0–150)
TSH SERPL DL<=0.005 MIU/L-ACNC: 1.87 UIU/ML (ref 0.27–4.2)
VLDLC SERPL CALC-MCNC: 16 MG/DL
WBC # BLD AUTO: 10 K/UL (ref 4–11)

## 2024-04-20 LAB
EST. AVERAGE GLUCOSE BLD GHB EST-MCNC: 125.5 MG/DL
HBA1C MFR BLD: 6 %

## 2024-05-05 RX ORDER — ROSUVASTATIN CALCIUM 10 MG/1
10 TABLET, COATED ORAL NIGHTLY
Qty: 90 TABLET | Refills: 3 | Status: SHIPPED | OUTPATIENT
Start: 2024-05-05

## 2024-06-11 ENCOUNTER — OFFICE VISIT (OUTPATIENT)
Dept: FAMILY MEDICINE CLINIC | Age: 64
End: 2024-06-11
Payer: MEDICARE

## 2024-06-11 VITALS
TEMPERATURE: 96.9 F | DIASTOLIC BLOOD PRESSURE: 84 MMHG | WEIGHT: 290 LBS | BODY MASS INDEX: 42.83 KG/M2 | SYSTOLIC BLOOD PRESSURE: 122 MMHG | HEART RATE: 57 BPM | OXYGEN SATURATION: 96 %

## 2024-06-11 DIAGNOSIS — J01.90 ACUTE BACTERIAL SINUSITIS: Primary | ICD-10-CM

## 2024-06-11 DIAGNOSIS — B96.89 ACUTE BACTERIAL SINUSITIS: Primary | ICD-10-CM

## 2024-06-11 PROCEDURE — 3079F DIAST BP 80-89 MM HG: CPT | Performed by: NURSE PRACTITIONER

## 2024-06-11 PROCEDURE — 3074F SYST BP LT 130 MM HG: CPT | Performed by: NURSE PRACTITIONER

## 2024-06-11 PROCEDURE — 1123F ACP DISCUSS/DSCN MKR DOCD: CPT | Performed by: NURSE PRACTITIONER

## 2024-06-11 PROCEDURE — 99213 OFFICE O/P EST LOW 20 MIN: CPT | Performed by: NURSE PRACTITIONER

## 2024-06-11 RX ORDER — AZITHROMYCIN 250 MG/1
TABLET, FILM COATED ORAL
Qty: 6 TABLET | Refills: 0 | Status: SHIPPED | OUTPATIENT
Start: 2024-06-11 | End: 2024-06-21

## 2024-06-11 ASSESSMENT — ENCOUNTER SYMPTOMS
SORE THROAT: 0
WHEEZING: 0
COUGH: 1
BACK PAIN: 0
VOMITING: 0
EYE REDNESS: 0
EYE ITCHING: 0
DIARRHEA: 0
CONSTIPATION: 0
COLOR CHANGE: 0
SINUS PAIN: 1
SINUS PRESSURE: 1
CHEST TIGHTNESS: 0
ABDOMINAL PAIN: 0
NAUSEA: 0
SHORTNESS OF BREATH: 0
RHINORRHEA: 1
BLOOD IN STOOL: 0

## 2024-06-11 NOTE — PROGRESS NOTES
Jacob Puri (:  1960) is a 64 y.o. male,Established patient, here for evaluation of the following chief complaint(s):  Sinus Problem      ASSESSMENT/PLAN:  1. Acute bacterial sinusitis  Assessment & Plan:   Acute problem not controlled or stable.  Continue with symptomatic management. Recommend increased water intake as well as saline irrigation, mucolytics, and antihistamines as needed. Advised to call back if no improvement over the next 4-7 days.   Zpack to pharmacy.       No follow-ups on file.    SUBJECTIVE/OBJECTIVE:  Patient comes to the office complaining of cough, congestion, drainage that has been present for the last several days. His cough is productive of yellow sputum. Patient does not have associated fever or chills.  Patient denies gastrointestinal symptoms related to His present condition. He recently had a uti in April and was treated with biaxin and did not feel that he really ever fully recovered.       Current Outpatient Medications   Medication Sig Dispense Refill    azithromycin (ZITHROMAX) 250 MG tablet 500mg on day 1 followed by 250mg on days 2 - 5 6 tablet 0    apixaban (ELIQUIS) 5 MG TABS tablet Take 1 tablet by mouth 2 times daily 60 tablet 3    rosuvastatin (CRESTOR) 10 MG tablet TAKE 1 TABLET BY MOUTH EVERY NIGHT 90 tablet 3    lisinopril-hydroCHLOROthiazide (PRINZIDE;ZESTORETIC) 20-25 MG per tablet Take 1 tablet by mouth daily 90 tablet 1    metFORMIN (GLUCOPHAGE-XR) 500 MG extended release tablet TAKE 1 TABLET BY MOUTH DAILY WITH BREAKFAST 90 tablet 1    hydroCHLOROthiazide (HYDRODIURIL) 25 MG tablet TAKE 1 TABLET BY MOUTH DAILY (Patient not taking: Reported on 2024) 90 tablet 3    omeprazole (PRILOSEC) 20 MG delayed release capsule TAKE 1 CAPSULE BY MOUTH EVERY DAY (Patient not taking: Reported on 2024) 90 capsule 3     No current facility-administered medications for this visit.         Review of Systems   Constitutional:  Negative for chills, fatigue and fever.

## 2024-06-11 NOTE — ASSESSMENT & PLAN NOTE
Acute problem not controlled or stable.  Continue with symptomatic management. Recommend increased water intake as well as saline irrigation, mucolytics, and antihistamines as needed. Advised to call back if no improvement over the next 4-7 days.   Zpack to pharmacy.

## 2024-06-14 ENCOUNTER — TELEPHONE (OUTPATIENT)
Dept: FAMILY MEDICINE CLINIC | Age: 64
End: 2024-06-14

## 2024-06-14 NOTE — TELEPHONE ENCOUNTER
Pt called to follow up from appt on Tuesday, pt stated the Z-mekhi did not help & as wonder if you can call the other medication you suggested at the appt in. Pt did not remember what the name of the medication you suggested.

## 2024-07-10 RX ORDER — OMEPRAZOLE 20 MG/1
20 CAPSULE, DELAYED RELEASE ORAL DAILY
Qty: 90 CAPSULE | Refills: 1 | Status: SHIPPED | OUTPATIENT
Start: 2024-07-10

## 2024-07-10 NOTE — TELEPHONE ENCOUNTER
Medication name:omeprazole (PRILOSEC) 20 MG delayed release capsule   Medication dose:  Frequency: TAKE 1 CAPSULE BY MOUTH EVERY DAY   Quantity: 90 capsule   Pharmacy name:[facility] #[id number] - [place], [place] - [address] - P [phone] - F [phone]  [address] [facility] [address]  Phone: [phone]  Fax: [phone]   Pharmacy number:  Last OV:6/11/24  Last Labs: 4/19/24

## 2024-08-07 RX ORDER — METFORMIN HYDROCHLORIDE 500 MG/1
500 TABLET, EXTENDED RELEASE ORAL DAILY
Qty: 90 TABLET | Refills: 1 | Status: SHIPPED | OUTPATIENT
Start: 2024-08-07

## 2024-08-07 NOTE — TELEPHONE ENCOUNTER
Medication name:metFORMIN (GLUCOPHAGE-XR) 500 MG extended release tablet   Medication dose:  Frequency:: TAKE 1 TABLET BY MOUTH DAILY WITH BREAKFAST   Quantity:90 tablets  Pharmacy name:  [facility] #[id number] - [place], [place] - [address] - P [phone] - F [phone]  [address] [facility] [address]  Phone: [phone]  Fax: [phone]   View refill request trace      Pharmacy number:  Last OV:6/11/24  Last Labs:4/19/24

## 2024-08-07 NOTE — TELEPHONE ENCOUNTER
Does Glucophage XR need a PA?  Asking because it's not a drug that typically needs one.    Please respond to the pool ( P MHCX PSC MEDICATION PRE-AUTH).      Thank you!

## 2024-09-23 ENCOUNTER — TELEPHONE (OUTPATIENT)
Dept: FAMILY MEDICINE CLINIC | Age: 64
End: 2024-09-23

## 2024-12-17 ENCOUNTER — TELEPHONE (OUTPATIENT)
Dept: FAMILY MEDICINE CLINIC | Age: 64
End: 2024-12-17

## 2024-12-17 NOTE — TELEPHONE ENCOUNTER
Patient needs refill sent to out of town pharmacy listed below.    6/1/24 4/19/24    lisinopril-hydroCHLOROthiazide (PRINZIDE;ZESTORETIC) 20-25 MG per tablet [[id number]]    Order Details  Dose: 1 tablet Route: Oral Frequency: DAILY   Dispense Quantity: 90 tablet Refills: 1          Sig: Take 1 tablet by mouth daily         [facility] #[id number] - [place], [place] - [address] [phone] - F [phone] [[id number]]

## 2024-12-17 NOTE — TELEPHONE ENCOUNTER
Medication:   Requested Prescriptions      No prescriptions requested or ordered in this encounter     Last Filled:      Last appt: 6/11/2024   Next appt: Visit date not found    Last OARRS:        No data to display

## 2024-12-18 RX ORDER — LISINOPRIL AND HYDROCHLOROTHIAZIDE 20; 25 MG/1; MG/1
1 TABLET ORAL DAILY
Qty: 90 TABLET | Refills: 1 | Status: SHIPPED | OUTPATIENT
Start: 2024-12-18

## 2025-01-28 RX ORDER — METFORMIN HYDROCHLORIDE 500 MG/1
500 TABLET, EXTENDED RELEASE ORAL DAILY
Qty: 30 TABLET | Refills: 0 | Status: SHIPPED | OUTPATIENT
Start: 2025-01-28

## 2025-01-28 NOTE — TELEPHONE ENCOUNTER
Medication:   Requested Prescriptions     Pending Prescriptions Disp Refills    metFORMIN (GLUCOPHAGE-XR) 500 MG extended release tablet [Pharmacy Med Name: METFORMIN ER 500MG 24HR TABS] 90 tablet 1     Sig: TAKE 1 TABLET BY MOUTH DAILY     Last Filled:  11/6/2024     Last appt: 6/11/2024   Next appt: Visit date not found    Last OARRS:        No data to display

## 2025-01-29 NOTE — TELEPHONE ENCOUNTER
Requested Prescriptions     Pending Prescriptions Disp Refills    apixaban (ELIQUIS) 5 MG TABS tablet 60 tablet 3     Sig: Take 1 tablet by mouth 2 times daily          Last OV: 4/1/2024     Last labs: 4/19/2024     F/u: 2/13/2025

## 2025-01-29 NOTE — TELEPHONE ENCOUNTER
Medication:   Requested Prescriptions     Pending Prescriptions Disp Refills    apixaban (ELIQUIS) 5 MG TABS tablet 60 tablet 3     Sig: Take 1 tablet by mouth 2 times daily     Last Filled:      Last appt: 6/11/2024   Next appt: 2/13/2025    Last OARRS:        No data to display

## 2025-01-30 ENCOUNTER — TELEPHONE (OUTPATIENT)
Dept: FAMILY MEDICINE CLINIC | Age: 65
End: 2025-01-30

## 2025-01-30 DIAGNOSIS — I10 ESSENTIAL HYPERTENSION, BENIGN: ICD-10-CM

## 2025-01-30 DIAGNOSIS — I50.22 CHRONIC SYSTOLIC (CONGESTIVE) HEART FAILURE (HCC): Primary | ICD-10-CM

## 2025-01-30 DIAGNOSIS — R73.9 HYPERGLYCEMIA: ICD-10-CM

## 2025-01-30 DIAGNOSIS — Z00.00 ROUTINE GENERAL MEDICAL EXAMINATION AT A HEALTH CARE FACILITY: ICD-10-CM

## 2025-01-30 PROBLEM — J01.90 ACUTE BACTERIAL SINUSITIS: Status: RESOLVED | Noted: 2024-06-11 | Resolved: 2025-01-30

## 2025-01-30 PROBLEM — B96.89 ACUTE BACTERIAL SINUSITIS: Status: RESOLVED | Noted: 2024-06-11 | Resolved: 2025-01-30

## 2025-01-30 NOTE — TELEPHONE ENCOUNTER
Pt. Has f/u 2-13-25. Please sign lab orders and [facility]

## 2025-02-10 DIAGNOSIS — R73.9 HYPERGLYCEMIA: ICD-10-CM

## 2025-02-10 DIAGNOSIS — I10 ESSENTIAL HYPERTENSION, BENIGN: ICD-10-CM

## 2025-02-10 DIAGNOSIS — Z00.00 ROUTINE GENERAL MEDICAL EXAMINATION AT A HEALTH CARE FACILITY: ICD-10-CM

## 2025-02-10 DIAGNOSIS — I50.22 CHRONIC SYSTOLIC (CONGESTIVE) HEART FAILURE (HCC): ICD-10-CM

## 2025-02-11 LAB
ALBUMIN SERPL-MCNC: 4.4 G/DL (ref 3.4–5)
ALBUMIN/GLOB SERPL: 2.1 {RATIO} (ref 1.1–2.2)
ALP SERPL-CCNC: 64 U/L (ref 40–129)
ALT SERPL-CCNC: 19 U/L (ref 10–40)
ANION GAP SERPL CALCULATED.3IONS-SCNC: 10 MMOL/L (ref 3–16)
AST SERPL-CCNC: 25 U/L (ref 15–37)
BASOPHILS # BLD: 0.1 K/UL (ref 0–0.2)
BASOPHILS NFR BLD: 1.2 %
BILIRUB SERPL-MCNC: 0.5 MG/DL (ref 0–1)
BUN SERPL-MCNC: 14 MG/DL (ref 7–20)
CALCIUM SERPL-MCNC: 9.8 MG/DL (ref 8.3–10.6)
CHLORIDE SERPL-SCNC: 102 MMOL/L (ref 99–110)
CHOLEST SERPL-MCNC: 128 MG/DL (ref 0–199)
CO2 SERPL-SCNC: 28 MMOL/L (ref 21–32)
CREAT SERPL-MCNC: 0.8 MG/DL (ref 0.8–1.3)
DEPRECATED RDW RBC AUTO: 13.5 % (ref 12.4–15.4)
EOSINOPHIL # BLD: 0.1 K/UL (ref 0–0.6)
EOSINOPHIL NFR BLD: 0.8 %
EST. AVERAGE GLUCOSE BLD GHB EST-MCNC: 134.1 MG/DL
GFR SERPLBLD CREATININE-BSD FMLA CKD-EPI: >90 ML/MIN/{1.73_M2}
GLUCOSE SERPL-MCNC: 100 MG/DL (ref 70–99)
HBA1C MFR BLD: 6.3 %
HCT VFR BLD AUTO: 49.6 % (ref 40.5–52.5)
HDLC SERPL-MCNC: 43 MG/DL (ref 40–60)
HGB BLD-MCNC: 17 G/DL (ref 13.5–17.5)
LDLC SERPL CALC-MCNC: 65 MG/DL
LYMPHOCYTES # BLD: 2.6 K/UL (ref 1–5.1)
LYMPHOCYTES NFR BLD: 26.9 %
MCH RBC QN AUTO: 31.4 PG (ref 26–34)
MCHC RBC AUTO-ENTMCNC: 34.2 G/DL (ref 31–36)
MCV RBC AUTO: 91.7 FL (ref 80–100)
MONOCYTES # BLD: 0.7 K/UL (ref 0–1.3)
MONOCYTES NFR BLD: 7.1 %
NEUTROPHILS # BLD: 6.2 K/UL (ref 1.7–7.7)
NEUTROPHILS NFR BLD: 64 %
PLATELET # BLD AUTO: 242 K/UL (ref 135–450)
PMV BLD AUTO: 8.7 FL (ref 5–10.5)
POTASSIUM SERPL-SCNC: 5 MMOL/L (ref 3.5–5.1)
PROT SERPL-MCNC: 6.5 G/DL (ref 6.4–8.2)
PSA SERPL DL<=0.01 NG/ML-MCNC: 0.44 NG/ML (ref 0–4)
RBC # BLD AUTO: 5.41 M/UL (ref 4.2–5.9)
SODIUM SERPL-SCNC: 140 MMOL/L (ref 136–145)
TRIGL SERPL-MCNC: 102 MG/DL (ref 0–150)
TSH SERPL DL<=0.005 MIU/L-ACNC: 2.01 UIU/ML (ref 0.27–4.2)
VLDLC SERPL CALC-MCNC: 20 MG/DL
WBC # BLD AUTO: 9.7 K/UL (ref 4–11)

## 2025-02-13 ENCOUNTER — OFFICE VISIT (OUTPATIENT)
Dept: FAMILY MEDICINE CLINIC | Age: 65
End: 2025-02-13

## 2025-02-13 VITALS
RESPIRATION RATE: 16 BRPM | SYSTOLIC BLOOD PRESSURE: 120 MMHG | DIASTOLIC BLOOD PRESSURE: 86 MMHG | HEART RATE: 58 BPM | WEIGHT: 295 LBS | OXYGEN SATURATION: 97 % | TEMPERATURE: 96.5 F | HEIGHT: 69 IN | BODY MASS INDEX: 43.69 KG/M2

## 2025-02-13 DIAGNOSIS — I10 ESSENTIAL HYPERTENSION, BENIGN: Chronic | ICD-10-CM

## 2025-02-13 DIAGNOSIS — Z00.00 INITIAL MEDICARE ANNUAL WELLNESS VISIT: Primary | ICD-10-CM

## 2025-02-13 DIAGNOSIS — G47.33 OSA (OBSTRUCTIVE SLEEP APNEA): ICD-10-CM

## 2025-02-13 DIAGNOSIS — Z12.2 SCREENING FOR LUNG CANCER: ICD-10-CM

## 2025-02-13 DIAGNOSIS — Z87.891 PERSONAL HISTORY OF TOBACCO USE: ICD-10-CM

## 2025-02-13 DIAGNOSIS — I50.22 CHRONIC SYSTOLIC (CONGESTIVE) HEART FAILURE (HCC): ICD-10-CM

## 2025-02-13 DIAGNOSIS — Z79.01 CHRONIC ANTICOAGULATION: ICD-10-CM

## 2025-02-13 DIAGNOSIS — R73.9 HYPERGLYCEMIA: ICD-10-CM

## 2025-02-13 DIAGNOSIS — Z13.6 SCREENING FOR AAA (ABDOMINAL AORTIC ANEURYSM): ICD-10-CM

## 2025-02-13 DIAGNOSIS — Z23 NEED FOR SHINGLES VACCINE: ICD-10-CM

## 2025-02-13 DIAGNOSIS — E66.01 MORBID OBESITY: ICD-10-CM

## 2025-02-13 DIAGNOSIS — Z23 NEED FOR PNEUMOCOCCAL VACCINE: ICD-10-CM

## 2025-02-13 DIAGNOSIS — Z12.11 SCREEN FOR COLON CANCER: ICD-10-CM

## 2025-02-13 DIAGNOSIS — E78.00 HYPERCHOLESTEROLEMIA: Chronic | ICD-10-CM

## 2025-02-13 RX ORDER — TIRZEPATIDE 2.5 MG/.5ML
2.5 INJECTION, SOLUTION SUBCUTANEOUS WEEKLY
Qty: 2 ML | Refills: 5 | Status: SHIPPED | OUTPATIENT
Start: 2025-02-13

## 2025-02-13 SDOH — ECONOMIC STABILITY: FOOD INSECURITY: WITHIN THE PAST 12 MONTHS, YOU WORRIED THAT YOUR FOOD WOULD RUN OUT BEFORE YOU GOT MONEY TO BUY MORE.: NEVER TRUE

## 2025-02-13 SDOH — ECONOMIC STABILITY: FOOD INSECURITY: WITHIN THE PAST 12 MONTHS, THE FOOD YOU BOUGHT JUST DIDN'T LAST AND YOU DIDN'T HAVE MONEY TO GET MORE.: NEVER TRUE

## 2025-02-13 ASSESSMENT — PATIENT HEALTH QUESTIONNAIRE - PHQ9
SUM OF ALL RESPONSES TO PHQ9 QUESTIONS 1 & 2: 1
2. FEELING DOWN, DEPRESSED OR HOPELESS: SEVERAL DAYS
1. LITTLE INTEREST OR PLEASURE IN DOING THINGS: NOT AT ALL
SUM OF ALL RESPONSES TO PHQ QUESTIONS 1-9: 1

## 2025-02-13 ASSESSMENT — LIFESTYLE VARIABLES
HOW OFTEN DURING THE LAST YEAR HAVE YOU BEEN UNABLE TO REMEMBER WHAT HAPPENED THE NIGHT BEFORE BECAUSE YOU HAD BEEN DRINKING: NEVER
HOW OFTEN DURING THE LAST YEAR HAVE YOU FAILED TO DO WHAT WAS NORMALLY EXPECTED FROM YOU BECAUSE OF DRINKING: NEVER
HOW OFTEN DO YOU HAVE A DRINK CONTAINING ALCOHOL: 4 OR MORE TIMES A WEEK
HAS A RELATIVE, FRIEND, DOCTOR, OR ANOTHER HEALTH PROFESSIONAL EXPRESSED CONCERN ABOUT YOUR DRINKING OR SUGGESTED YOU CUT DOWN: NO
HOW MANY STANDARD DRINKS CONTAINING ALCOHOL DO YOU HAVE ON A TYPICAL DAY: 3 OR 4
HOW OFTEN DURING THE LAST YEAR HAVE YOU HAD A FEELING OF GUILT OR REMORSE AFTER DRINKING: NEVER
HOW OFTEN DURING THE LAST YEAR HAVE YOU NEEDED AN ALCOHOLIC DRINK FIRST THING IN THE MORNING TO GET YOURSELF GOING AFTER A NIGHT OF HEAVY DRINKING: NEVER
HOW OFTEN DURING THE LAST YEAR HAVE YOU FOUND THAT YOU WERE NOT ABLE TO STOP DRINKING ONCE YOU HAD STARTED: NEVER
HAVE YOU OR SOMEONE ELSE BEEN INJURED AS A RESULT OF YOUR DRINKING: NO

## 2025-02-13 NOTE — PROGRESS NOTES
Here for f/u, blood pressure,     Pt states that things are doing well, pt states that he has been feeling ok but is very limited in ability to exercise due to weight.  Pt is limited in terms of activity.  Pts weight has increased a bit, was as high as 300#.  Pts diet is not great, eating the 'cheapest thing possible'.  Pt eating a lot of processed food.  Doesn't eat out.  Pt trying to get more active.  Pt does drink 2-3 beers a day, does have days that he doesn't drink    Pt here for follow up of blood pressure.  Pt states doing great with adherence to therapy and feels well.  No issues of chest pain, shortness of breath.  No vision changes, headache, swelling in legs.    congestive heart failure seems stable w/o any flare of symptoms, no change in weight and no evidence of any progressive lower extremity edema.  No calf pain, no shortness of breath, wheezing.  No paroxysmal nocturnal dyspnea or orthopnea.  Consistent with therapy and monitoring weight regularly     Pt does have hx of obstructive sleep apnea, states he did get tested but did not start CPAP d/t cost issues      Medicare Annual Wellness Visit    Jacob Puir is here for Medicare AWV    Assessment & Plan   Initial Medicare annual wellness visit  Chronic systolic (congestive) heart failure (HCC)  Hypercholesterolemia  Essential hypertension, benign  Hyperglycemia  Chronic anticoagulation  Screen for colon cancer  -     AVIVA - Mustapha Reveles MD, Gastroenterology (ERCP & EUS), [place]  Morbid obesity  -     tirzepatide-weight management (ZEPBOUND) 2.5 MG/0.5ML SOAJ subCUTAneous auto-injector pen; Inject 2.5 mg into the skin once a week, Disp-2 mL, R-5Normal  HORACIO (obstructive sleep apnea)  -     tirzepatide-weight management (ZEPBOUND) 2.5 MG/0.5ML SOAJ subCUTAneous auto-injector pen; Inject 2.5 mg into the skin once a week, Disp-2 mL, R-5Normal  Screening for AAA (abdominal aortic aneurysm)  -     US ABDOMINAL AORTA LIMITED; Future  Need for

## 2025-02-13 NOTE — PATIENT INSTRUCTIONS
9 Ways to Cut Back on Drinking  Maybe you've found yourself drinking more alcohol than you'd prefer. If you want to cut back, here are some ideas to try.    Think before you drink.  Do you really want a drink, or is it just a habit? If you're used to having a drink at a certain time, try doing something else then.     Look for substitutes.  Find some no-alcohol drinks that you enjoy, like flavored seltzer water, tea with honey, or tonic with a slice of lime. Or try alcohol-free beer or \"virgin\" cocktails (without the alcohol).     Drink more water.  Use water to quench your thirst. Drink a glass of water before you have any alcohol. Have another glass along with every drink or between drinks.     Shrink your drink.  For example, have a bottle of beer instead of a pint. Use a smaller glass for wine. Choose drinks with lower alcohol content (ABV%). Or use less liquor and more mixer in cocktails.     Slow down.  It's easy to drink quickly and without thinking about it. Pay attention, and make each drink last longer.     Do the math.  Total up how much you spend on alcohol each month. How much is that a year? If you cut back, what could you do with the money you save?     Take a break.  Choose a day or two each week when you won't drink at all. Notice how you feel on those days, physically and emotionally. How did you sleep? Do you feel better? Over time, add more break days.     Count calories.  Would you like to lose some weight? For some people that's a good motivator for cutting back. Figure out how many calories are in each drink. How many does that add up to in a day? In a week? In a month?     Practice saying no.  Be ready when someone offers you a drink. Try: \"Thanks, I've had enough.\" Or \"Thanks, but I'm cutting back.\" Or \"No, thanks. I feel better when I drink less.\"   Current as of: November 15, 2023  Content Version: 14.3  © 2024 [employer].   Care instructions adapted under license by [facility]

## 2025-02-14 ENCOUNTER — TELEPHONE (OUTPATIENT)
Dept: FAMILY MEDICINE CLINIC | Age: 65
End: 2025-02-14

## 2025-02-14 NOTE — TELEPHONE ENCOUNTER
Prior authorization for medication ZEPBOUND SOLN AUTO-INJ has been DENIED by insurance [employer] due to REQUIREMENTS NOT BEING MET; LACK OF INFORMATION. Please advise on appeal or change in medications. Please see attached scanned denial letter for more information.

## 2025-02-17 NOTE — TELEPHONE ENCOUNTER
Notify pt that as suspected zepbound was not covered  Does he want to get through compound pharmacy?  It would be about $200/month and they can mail it

## 2025-02-18 NOTE — TELEPHONE ENCOUNTER
MA spoke with patient and relayed information from BP. Patient asked if they could think about this and call us back. Will ask for BP's MA.

## 2025-02-22 NOTE — TELEPHONE ENCOUNTER
Rx sent to [facility]  Notify pt and give him phone #  have him set up f/u appt in 4-6 weeks to see how it is done  That can be virtual

## 2025-03-10 RX ORDER — METFORMIN HYDROCHLORIDE 500 MG/1
500 TABLET, EXTENDED RELEASE ORAL DAILY
Qty: 30 TABLET | Refills: 5 | Status: SHIPPED | OUTPATIENT
Start: 2025-03-10

## 2025-03-10 NOTE — TELEPHONE ENCOUNTER
Medication:   Requested Prescriptions     Pending Prescriptions Disp Refills    metFORMIN (GLUCOPHAGE-XR) 500 MG extended release tablet [Pharmacy Med Name: METFORMIN ER 500MG 24HR TABS] 30 tablet 0     Sig: TAKE 1 TABLET BY MOUTH DAILY       Last Filled:  1/28/2025     Patient Phone Number: [phone] (home)     Last appt: 2/13/2025   Next appt: 4/8/2025    Last Labs DM:   Lab Results   Component Value Date/Time    LABA1C 6.3 02/10/2025 01:47 PM

## 2025-03-20 ENCOUNTER — TELEPHONE (OUTPATIENT)
Dept: FAMILY MEDICINE CLINIC | Age: 65
End: 2025-03-20

## 2025-03-20 NOTE — TELEPHONE ENCOUNTER
Pt has an upcoming appt. On 4/22 He is unsure as to what it is for and if he needs blood work. If someone could call him back to let him know.

## 2025-03-20 NOTE — TELEPHONE ENCOUNTER
MA spoke with patient and relayed that the appt is a follow up for the Zepbound medication they have been prescribed.

## 2025-04-22 ENCOUNTER — OFFICE VISIT (OUTPATIENT)
Dept: FAMILY MEDICINE CLINIC | Age: 65
End: 2025-04-22
Payer: MEDICARE

## 2025-04-22 VITALS
TEMPERATURE: 96.9 F | HEART RATE: 60 BPM | HEIGHT: 69 IN | DIASTOLIC BLOOD PRESSURE: 74 MMHG | OXYGEN SATURATION: 98 % | SYSTOLIC BLOOD PRESSURE: 128 MMHG | BODY MASS INDEX: 43.16 KG/M2 | WEIGHT: 291.4 LBS

## 2025-04-22 DIAGNOSIS — Z79.899 HIGH RISK MEDICATION USE: ICD-10-CM

## 2025-04-22 DIAGNOSIS — K59.03 DRUG INDUCED CONSTIPATION: ICD-10-CM

## 2025-04-22 DIAGNOSIS — Z12.11 SCREEN FOR COLON CANCER: ICD-10-CM

## 2025-04-22 DIAGNOSIS — R73.9 HYPERGLYCEMIA: ICD-10-CM

## 2025-04-22 DIAGNOSIS — L82.1 SK (SEBORRHEIC KERATOSIS): ICD-10-CM

## 2025-04-22 DIAGNOSIS — E78.00 HYPERCHOLESTEROLEMIA: Chronic | ICD-10-CM

## 2025-04-22 DIAGNOSIS — I10 ESSENTIAL HYPERTENSION, BENIGN: ICD-10-CM

## 2025-04-22 DIAGNOSIS — Z13.6 SCREENING FOR AAA (ABDOMINAL AORTIC ANEURYSM): ICD-10-CM

## 2025-04-22 DIAGNOSIS — E66.01 MORBID OBESITY (HCC): ICD-10-CM

## 2025-04-22 DIAGNOSIS — Z12.2 SCREENING FOR LUNG CANCER: ICD-10-CM

## 2025-04-22 DIAGNOSIS — I50.22 CHRONIC SYSTOLIC (CONGESTIVE) HEART FAILURE (HCC): Primary | ICD-10-CM

## 2025-04-22 PROCEDURE — 1159F MED LIST DOCD IN RCRD: CPT | Performed by: FAMILY MEDICINE

## 2025-04-22 PROCEDURE — G2211 COMPLEX E/M VISIT ADD ON: HCPCS | Performed by: FAMILY MEDICINE

## 2025-04-22 PROCEDURE — 99214 OFFICE O/P EST MOD 30 MIN: CPT | Performed by: FAMILY MEDICINE

## 2025-04-22 PROCEDURE — 3078F DIAST BP <80 MM HG: CPT | Performed by: FAMILY MEDICINE

## 2025-04-22 PROCEDURE — 3074F SYST BP LT 130 MM HG: CPT | Performed by: FAMILY MEDICINE

## 2025-04-22 PROCEDURE — 1123F ACP DISCUSS/DSCN MKR DOCD: CPT | Performed by: FAMILY MEDICINE

## 2025-04-22 RX ORDER — SEMAGLUTIDE 0.5 MG/.5ML
INJECTION, SOLUTION SUBCUTANEOUS
Qty: 2 ML | Refills: 5 | Status: SHIPPED | OUTPATIENT
Start: 2025-04-22

## 2025-04-22 ASSESSMENT — PATIENT HEALTH QUESTIONNAIRE - PHQ9
2. FEELING DOWN, DEPRESSED OR HOPELESS: NOT AT ALL
SUM OF ALL RESPONSES TO PHQ QUESTIONS 1-9: 0
1. LITTLE INTEREST OR PLEASURE IN DOING THINGS: NOT AT ALL
SUM OF ALL RESPONSES TO PHQ QUESTIONS 1-9: 0

## 2025-04-22 NOTE — PROGRESS NOTES
Here for f/u and recheck of blood pressure, obesity.  At last visit, we did get him started on semaglutide compound injections [facility] to see if we can help with his obesity, weight concerns.  Pt has noted some mild benefit with weight loss and appetite suppression.  Pt did increase to 0.5mg and at this time is comfortable.  Pt did tolerate ok, no nausea, abd pain.  Does have some constipation.  Not taking anything     Pt in the process of moving from up here to [facility].  He is in the process of cleaning up the house here in prep for sale, and hopes to get on the market soon.  Daughter in law will list the house.  Pt is up and back frequently, will be going down later today.  Its about 4 hours.      Pt here for follow up of blood pressure.  Pt states doing great with adherence to therapy and feels well.  No issues of chest pain, shortness of breath.  No vision changes, headache, swelling in legs.     Here for f/u of cholesterol.  Pt has been working on diet/exercise and is consistent with therapy.  No side effects from current therapy.  No chest pain, shortness of breath.  No numbness/tingling in extremities.  Denies any evidence of myalgias, arthrlagias       Except as noted above in the history of present illness, the review of systems is  negative for headache, vision changes, chest pain, shortness of breath, abdominal pain, urinary sx, bowel changes.    Past medical, surgical, and social history reviewed and updated  Medications and allergies reviewed and updated        O: /74   Pulse 60   Temp 96.9 °F (36.1 °C)   Ht 1.753 m (5' 9\")   Wt 132.2 kg (291 lb 6.4 oz)   SpO2 98%   BMI 43.03 kg/m²   GEN: No acute distress, cooperative, well nourished, alert.   HEENT: PEERLA, EOMI , normocephalic/atraumatic, nares and oropharynx clear.  Mucous membranes normal, Tympanic membranes clear bilaterally.  Neck: soft, supple, no thyromegaly, mass, no Lymphadenopathy  CV: Regular

## 2025-06-09 NOTE — TELEPHONE ENCOUNTER
Medication name:  apixaban (ELIQUIS) 5 MG TABS tablet   Medication dose:  Frequency:Take 1 tablet by mouth 2 times daily   Quantity:60 tablets  Pharmacy name:[facility] #[id number] - [place], [place] - [address] - P [phone] - F [phone]  [address] [facility] [address]  Phone: [phone]  Fax: [phone]   Pharmacy number:  Last OV:4/22/25  Last Labs:2/10/25

## 2025-06-16 ENCOUNTER — OFFICE VISIT (OUTPATIENT)
Dept: FAMILY MEDICINE CLINIC | Age: 65
End: 2025-06-16
Payer: MEDICARE

## 2025-06-16 VITALS
SYSTOLIC BLOOD PRESSURE: 126 MMHG | BODY MASS INDEX: 41.7 KG/M2 | OXYGEN SATURATION: 95 % | DIASTOLIC BLOOD PRESSURE: 70 MMHG | HEART RATE: 72 BPM | WEIGHT: 282.4 LBS | RESPIRATION RATE: 16 BRPM | TEMPERATURE: 97.1 F

## 2025-06-16 DIAGNOSIS — N39.0 URINARY TRACT INFECTION WITH HEMATURIA, SITE UNSPECIFIED: Primary | ICD-10-CM

## 2025-06-16 DIAGNOSIS — R31.9 URINARY TRACT INFECTION WITH HEMATURIA, SITE UNSPECIFIED: Primary | ICD-10-CM

## 2025-06-16 LAB
BILIRUBIN, POC: ABNORMAL
BLOOD URINE, POC: ABNORMAL
CLARITY, POC: ABNORMAL
COLOR, POC: ABNORMAL
GLUCOSE URINE, POC: ABNORMAL MG/DL
KETONES, POC: ABNORMAL MG/DL
LEUKOCYTE EST, POC: ABNORMAL
NITRITE, POC: POSITIVE
PH, POC: 7
PROTEIN, POC: >=300 MG/DL
SPECIFIC GRAVITY, POC: 1.02
UROBILINOGEN, POC: 1 MG/DL

## 2025-06-16 PROCEDURE — 81002 URINALYSIS NONAUTO W/O SCOPE: CPT | Performed by: NURSE PRACTITIONER

## 2025-06-16 PROCEDURE — 1123F ACP DISCUSS/DSCN MKR DOCD: CPT | Performed by: NURSE PRACTITIONER

## 2025-06-16 PROCEDURE — 1159F MED LIST DOCD IN RCRD: CPT | Performed by: NURSE PRACTITIONER

## 2025-06-16 PROCEDURE — 3078F DIAST BP <80 MM HG: CPT | Performed by: NURSE PRACTITIONER

## 2025-06-16 PROCEDURE — 99214 OFFICE O/P EST MOD 30 MIN: CPT | Performed by: NURSE PRACTITIONER

## 2025-06-16 PROCEDURE — 3074F SYST BP LT 130 MM HG: CPT | Performed by: NURSE PRACTITIONER

## 2025-06-16 RX ORDER — NITROFURANTOIN 25; 75 MG/1; MG/1
100 CAPSULE ORAL 2 TIMES DAILY
Qty: 14 CAPSULE | Refills: 0 | Status: SHIPPED | OUTPATIENT
Start: 2025-06-16 | End: 2025-06-23

## 2025-06-16 ASSESSMENT — ENCOUNTER SYMPTOMS
ABDOMINAL PAIN: 0
RHINORRHEA: 0
EYE REDNESS: 0
VOMITING: 0
WHEEZING: 0
SHORTNESS OF BREATH: 0
EYE ITCHING: 0
SINUS PRESSURE: 0
BACK PAIN: 0
CHEST TIGHTNESS: 0
NAUSEA: 0
DIARRHEA: 0
BLOOD IN STOOL: 0
COUGH: 0
CONSTIPATION: 0
SORE THROAT: 0
COLOR CHANGE: 0

## 2025-06-16 ASSESSMENT — PATIENT HEALTH QUESTIONNAIRE - PHQ9
SUM OF ALL RESPONSES TO PHQ QUESTIONS 1-9: 0
SUM OF ALL RESPONSES TO PHQ QUESTIONS 1-9: 0
1. LITTLE INTEREST OR PLEASURE IN DOING THINGS: NOT AT ALL
SUM OF ALL RESPONSES TO PHQ QUESTIONS 1-9: 0
SUM OF ALL RESPONSES TO PHQ QUESTIONS 1-9: 0
2. FEELING DOWN, DEPRESSED OR HOPELESS: NOT AT ALL

## 2025-06-16 NOTE — PROGRESS NOTES
Jacob Puri (:  1960) is a 65 y.o. male,Established patient, here for evaluation of the following chief complaint(s):  Hematuria      Assessment & Plan  Urinary tract infection with hematuria, site unspecified   Acute condition, new, UA with positive nitrates and leukocytes. Start Macrobid BID x 7 days. Urine culture pending. No fever, flank pain, or abdominal pain that would indicate pyelonephritis or kidney stone.      Orders:    POCT Urinalysis no Micro    Culture, Urine; Future      No follow-ups on file.    Subjective       Patient presents to the office today with 1 day history of urgency, frequency, and dysuria. Hematuria started this morning. He is passing some small blood clots. No fever, chills, abdominal pain, or flank pain. UA done in office.     Hematuria  Irritative symptoms include frequency and urgency. Pertinent negatives include no abdominal pain, chills, dysuria, fever, flank pain, nausea or vomiting.     Review of Systems   Constitutional:  Negative for chills, fatigue and fever.   HENT:  Negative for congestion, ear pain, postnasal drip, rhinorrhea, sinus pressure, sneezing and sore throat.    Eyes:  Negative for redness and itching.   Respiratory:  Negative for cough, chest tightness, shortness of breath and wheezing.    Cardiovascular:  Negative for chest pain and palpitations.   Gastrointestinal:  Negative for abdominal pain, blood in stool, constipation, diarrhea, nausea and vomiting.   Endocrine: Negative for cold intolerance and heat intolerance.   Genitourinary:  Positive for frequency, hematuria and urgency. Negative for difficulty urinating, dysuria and flank pain.   Musculoskeletal:  Negative for arthralgias, back pain, joint swelling and myalgias.   Skin:  Negative for color change, pallor, rash and wound.   Allergic/Immunologic: Negative for environmental allergies and food allergies.   Neurological:  Negative for dizziness, seizures, syncope, weakness, light-headedness,

## 2025-06-18 LAB
BACTERIA UR CULT: ABNORMAL
ORGANISM: ABNORMAL

## 2025-06-21 RX ORDER — LISINOPRIL AND HYDROCHLOROTHIAZIDE 20; 25 MG/1; MG/1
1 TABLET ORAL DAILY
Qty: 90 TABLET | Refills: 1 | Status: SHIPPED | OUTPATIENT
Start: 2025-06-21

## 2025-06-27 ENCOUNTER — OFFICE VISIT (OUTPATIENT)
Dept: FAMILY MEDICINE CLINIC | Age: 65
End: 2025-06-27

## 2025-06-27 VITALS
TEMPERATURE: 97.1 F | HEART RATE: 59 BPM | DIASTOLIC BLOOD PRESSURE: 78 MMHG | WEIGHT: 283 LBS | HEIGHT: 69 IN | SYSTOLIC BLOOD PRESSURE: 128 MMHG | OXYGEN SATURATION: 97 % | BODY MASS INDEX: 41.92 KG/M2

## 2025-06-27 DIAGNOSIS — R31.0 GROSS HEMATURIA: ICD-10-CM

## 2025-06-27 DIAGNOSIS — N02.9 RECURRENT AND PERSISTENT HEMATURIA: ICD-10-CM

## 2025-06-27 DIAGNOSIS — N30.01 ACUTE CYSTITIS WITH HEMATURIA: Primary | ICD-10-CM

## 2025-06-27 DIAGNOSIS — R31.9 HEMATURIA, UNSPECIFIED TYPE: ICD-10-CM

## 2025-06-27 DIAGNOSIS — R30.9 URINARY PAIN: Primary | ICD-10-CM

## 2025-06-27 LAB
BILIRUBIN, POC: NORMAL
BLOOD URINE, POC: NORMAL
CLARITY, POC: NORMAL
COLOR, POC: NORMAL
GLUCOSE URINE, POC: NORMAL MG/DL
KETONES, POC: NORMAL MG/DL
LEUKOCYTE EST, POC: NORMAL
NITRITE, POC: NEGATIVE
PH, POC: 6.5
PROTEIN, POC: NORMAL MG/DL
SPECIFIC GRAVITY, POC: 1.01
UROBILINOGEN, POC: NORMAL MG/DL

## 2025-06-27 RX ORDER — SULFAMETHOXAZOLE AND TRIMETHOPRIM 800; 160 MG/1; MG/1
1 TABLET ORAL 2 TIMES DAILY
Qty: 28 TABLET | Refills: 0 | Status: SHIPPED | OUTPATIENT
Start: 2025-06-27 | End: 2025-07-11

## 2025-06-27 RX ORDER — TAMSULOSIN HYDROCHLORIDE 0.4 MG/1
0.4 CAPSULE ORAL DAILY
Qty: 90 CAPSULE | Refills: 1 | Status: SHIPPED | OUTPATIENT
Start: 2025-06-27

## 2025-06-27 RX ORDER — PHENAZOPYRIDINE HYDROCHLORIDE 100 MG/1
100 TABLET, FILM COATED ORAL 3 TIMES DAILY PRN
Qty: 30 TABLET | Refills: 0 | Status: SHIPPED | OUTPATIENT
Start: 2025-06-27 | End: 2026-06-27

## 2025-06-27 NOTE — PROGRESS NOTES
Jacob Puri (:  1960) is a 65 y.o. male,Established patient, here for evaluation of the following chief complaint(s):  Hematuria (Urinary frequency and pain started last night, blood in urine this morning. Was seen in our ofc last Monday and was treated with abx for UTI.)      ASSESSMENT/PLAN:  Assessment & Plan  1. Recurrent hematuria.  - Suspect prostatitis- will cover with 2 weeks Bactrim.   - Flomax  - Pyridium for discomfort  - F/u with urology if no improvement      1. Urinary pain  -     POCT Urinalysis no Micro  -     Mike Levine MD, Urology, [facility]  2. Hematuria, unspecified type  -     POCT Urinalysis no Mike Longoria MD, Urology, [facility]  3. Recurrent and persistent hematuria  -     Mike Levine MD, Urology, [facility]    No follow-ups on file.    SUBJECTIVE/OBJECTIVE:    History of Present Illness  The patient presents for evaluation of blood in his urine.    He first observed hematuria last Monday, which was managed with an antibiotic regimen prescribed by Dr. Manrique. He experienced discomfort due to the presence of blood clots in his urine. After a week of antibiotic treatment, his condition seemed to improve. However, he reported frequent urination last night, up to 5 times, and noticed the recurrence of hematuria this morning. His urine initially contained a small amount of blood and clots, which later progressed to bright red blood. He has not sought urological consultation for this issue. He reports no fevers or pain associated with urination. He also reports no history of prostate-related issues. He recalls a similar episode of hematuria approximately 2 years ago, prior to the initiation of Eliquis therapy. He has been on Eliquis since 2023. He reports a sensation of incomplete bladder emptying during urination. He is currently on a twice-daily regimen of Eliquis. He has a past medical history of gonorrhea during his

## (undated) DEVICE — LAMINECTOMY PK

## (undated) DEVICE — GLOVE SURG SZ 75 L12IN FNGR THK87MIL DK GRN LTX FREE ISOLEX

## (undated) DEVICE — TRAY CATHETER 16FR F INCLUDE BARDX IC COMPLT CARE DRNGE BG

## (undated) DEVICE — COVER LT HNDL BLU PLAS

## (undated) DEVICE — BLADE ES L4IN INSUL EDGE

## (undated) DEVICE — GARMENT,MEDLINE,DVT,INT,CALF,MED, GEN2: Brand: MEDLINE

## (undated) DEVICE — SLEEVE PROTCT FOR SCRDRVR AND AWL SYNFIX EVOLUTION SYS

## (undated) DEVICE — SURGICAL SET UP - SURE SET: Brand: MEDLINE INDUSTRIES, INC.

## (undated) DEVICE — SUTURE PERMAHAND SZ 0 L30IN NONABSORBABLE BLK SILK BRAID A306H

## (undated) DEVICE — SUTURE PDS II SZ 0 L60IN ABSRB VLT L48MM CTX 1/2 CIR Z990G

## (undated) DEVICE — DRAPE 33X23IN INCISE ANTIMICROB IOBAN 2

## (undated) DEVICE — SOLUTION IV 1000ML 0.9% SOD CHL

## (undated) DEVICE — PAK 9733498NAV NAVIGATED PEDICLE ACCESS: Brand: NIM-SPINE

## (undated) DEVICE — ORTHO PRE OP PACK: Brand: MEDLINE INDUSTRIES, INC.

## (undated) DEVICE — SHEET, T, LAPAROTOMY, STERILE: Brand: MEDLINE

## (undated) DEVICE — C-ARMOR C-ARM EQUIPMENT COVERS CLEAR STERILE UNIVERSAL FIT 12 PER CASE: Brand: C-ARMOR

## (undated) DEVICE — SUTURE VCRL SZ 0 L18IN ABSRB UD L36MM CT-1 1/2 CIR J840D

## (undated) DEVICE — TOOL 14MH30 LEGEND 14CM 3MM: Brand: MIDAS REX ™

## (undated) DEVICE — E-Z CLEAN, NON-STICK, PTFE COATED, ELECTROSURGICAL BLADE ELECTRODE, MODIFIED EXTENDED INSULATION, 2.5 INCH (6.35 CM): Brand: MEGADYNE

## (undated) DEVICE — APPLIER LIG CLP M L11IN TI STR RNG HNDL FOR 20 CLP DISP

## (undated) DEVICE — TOWEL,OR,DSP,ST,BLUE,DLX,8/PK,10PK/CS: Brand: MEDLINE

## (undated) DEVICE — SUTURE VCRL SZ 2 L27IN ABSRB VLT L65MM TP-1 1/2 CIR J649G

## (undated) DEVICE — SSC BONE WAX: Brand: SSC BONE WAX

## (undated) DEVICE — GLOVE SURG SZ 75 L12IN FNGR THK94MIL TRNSLUC YEL LTX

## (undated) DEVICE — SPONGE,NEURO,0.5"X3",XR,STRL,LF,10/PK: Brand: MEDLINE

## (undated) DEVICE — PACK,UNIVERSAL,NO GOWNS: Brand: MEDLINE

## (undated) DEVICE — APPLIER CLP L9.375IN APER 2.1MM CLS L3.8MM 20 SM TI CLP

## (undated) DEVICE — PLATE ES AD W 9FT CRD 2

## (undated) DEVICE — SPONGE,PEANUT,XRAY,ST,SM,3/8",5/CARD: Brand: MEDLINE INDUSTRIES, INC.

## (undated) DEVICE — SUTURE VCRL SZ 3-0 L27IN ABSRB UD L36MM CT-1 1/2 CIR J258H

## (undated) DEVICE — ELECTRODE NERVE STIM FOR SPNL CRD MONITORING

## (undated) DEVICE — SPONGE,LAP,18"X18",DLX,XR,ST,5/PK,40/PK: Brand: MEDLINE

## (undated) DEVICE — CABLE BPLR L12FT FLYING LD DISPOSABLE

## (undated) DEVICE — JEWISH HOSPITAL TURNOVER KIT: Brand: MEDLINE INDUSTRIES, INC.

## (undated) DEVICE — CATHETER IV 14GA L5.25IN PERIPH ORNG FEP POLYMER 3 BVL

## (undated) DEVICE — SYRINGE IRRIG 60ML SFT PLIABLE BLB EZ TO GRP 1 HND USE W/

## (undated) DEVICE — UNDERGLOVE SURG SZ 8 BLU LTX FREE SYN POLYISOPRENE POLYMER

## (undated) DEVICE — PIN 9733236 150MM STERILE PERC REF

## (undated) DEVICE — SLEEVE PROTCT THRD LCK FOR SYNFIX EVOLUTION SYS

## (undated) DEVICE — SUTURE PERMAHAND SZ 2-0 L30IN NONABSORBABLE BLK SILK W/O A305H

## (undated) DEVICE — SURE SET-DOUBLE BASIN-LF: Brand: MEDLINE INDUSTRIES, INC.

## (undated) DEVICE — SUTURE MCRYL SZ 4-0 L27IN ABSRB UD L19MM PS-2 1/2 CIR PRIM Y426H